# Patient Record
Sex: FEMALE | Race: BLACK OR AFRICAN AMERICAN | NOT HISPANIC OR LATINO | Employment: UNEMPLOYED | RURAL
[De-identification: names, ages, dates, MRNs, and addresses within clinical notes are randomized per-mention and may not be internally consistent; named-entity substitution may affect disease eponyms.]

---

## 2016-07-07 LAB — CRC RECOMMENDATION EXT: NORMAL

## 2017-12-05 ENCOUNTER — HISTORICAL (OUTPATIENT)
Dept: ADMINISTRATIVE | Facility: HOSPITAL | Age: 52
End: 2017-12-05

## 2017-12-11 LAB
LAB AP CLINICAL INFORMATION: NORMAL
LAB AP COMMENTS: NORMAL
LAB AP GENERAL CAT - HISTORICAL: NORMAL
LAB AP INTERPRETATION/RESULT - HISTORICAL: NEGATIVE
LAB AP SPECIMEN ADEQUACY - HISTORICAL: NORMAL
LAB AP SPECIMEN SUBMITTED - HISTORICAL: NORMAL

## 2018-03-02 ENCOUNTER — HISTORICAL (OUTPATIENT)
Dept: ADMINISTRATIVE | Facility: HOSPITAL | Age: 53
End: 2018-03-02

## 2018-03-05 LAB
LAB AP CLINICAL INFORMATION: NORMAL
LAB AP COMMENTS: NORMAL
LAB AP DIAGNOSIS - HISTORICAL: NORMAL
LAB AP GROSS PATHOLOGY - HISTORICAL: NORMAL
LAB AP SPECIMEN SUBMITTED - HISTORICAL: NORMAL

## 2020-11-09 ENCOUNTER — HISTORICAL (OUTPATIENT)
Dept: ADMINISTRATIVE | Facility: HOSPITAL | Age: 55
End: 2020-11-09

## 2021-01-12 ENCOUNTER — HISTORICAL (OUTPATIENT)
Dept: ADMINISTRATIVE | Facility: HOSPITAL | Age: 56
End: 2021-01-12

## 2021-01-27 ENCOUNTER — HISTORICAL (OUTPATIENT)
Dept: ADMINISTRATIVE | Facility: HOSPITAL | Age: 56
End: 2021-01-27

## 2021-02-02 ENCOUNTER — HISTORICAL (OUTPATIENT)
Dept: ADMINISTRATIVE | Facility: HOSPITAL | Age: 56
End: 2021-02-02

## 2021-03-19 DIAGNOSIS — Z91.09 ENVIRONMENTAL ALLERGIES: Primary | ICD-10-CM

## 2021-03-19 DIAGNOSIS — M25.50 ARTHRALGIA, UNSPECIFIED JOINT: ICD-10-CM

## 2021-03-19 RX ORDER — IBUPROFEN 800 MG/1
800 TABLET ORAL 3 TIMES DAILY
COMMUNITY
End: 2021-03-19 | Stop reason: SDUPTHER

## 2021-03-19 RX ORDER — CETIRIZINE HYDROCHLORIDE 10 MG/1
10 TABLET ORAL DAILY
Qty: 90 TABLET | Refills: 0 | Status: SHIPPED | OUTPATIENT
Start: 2021-03-19 | End: 2021-08-18 | Stop reason: SDUPTHER

## 2021-03-19 RX ORDER — CETIRIZINE HYDROCHLORIDE 10 MG/1
10 TABLET ORAL DAILY
COMMUNITY
Start: 2021-01-16 | End: 2021-03-19 | Stop reason: SDUPTHER

## 2021-03-19 RX ORDER — IBUPROFEN 800 MG/1
800 TABLET ORAL EVERY 6 HOURS PRN
Qty: 90 TABLET | Refills: 0 | Status: SHIPPED | OUTPATIENT
Start: 2021-03-19 | End: 2021-04-18

## 2021-03-23 DIAGNOSIS — R06.02 SHORTNESS OF BREATH: Primary | ICD-10-CM

## 2021-04-16 DIAGNOSIS — R13.10 DYSPHAGIA: Primary | ICD-10-CM

## 2021-04-17 VITALS — HEIGHT: 67 IN | WEIGHT: 183 LBS | BODY MASS INDEX: 28.72 KG/M2

## 2021-04-17 RX ORDER — ASPIRIN 81 MG/1
81 TABLET ORAL DAILY
COMMUNITY

## 2021-04-17 RX ORDER — MIRTAZAPINE 15 MG/1
TABLET, FILM COATED ORAL
Status: ON HOLD | COMMUNITY
Start: 2021-04-07 | End: 2021-11-30 | Stop reason: HOSPADM

## 2021-04-17 RX ORDER — OMEPRAZOLE 40 MG/1
1 CAPSULE, DELAYED RELEASE ORAL DAILY
COMMUNITY
Start: 2021-01-16 | End: 2021-08-18 | Stop reason: SDUPTHER

## 2021-04-17 RX ORDER — PHENAZOPYRIDINE HYDROCHLORIDE 200 MG/1
200 TABLET, FILM COATED ORAL 3 TIMES DAILY PRN
COMMUNITY
End: 2022-05-18

## 2021-04-17 RX ORDER — LOTEPREDNOL ETABONATE 5 MG/ML
1 SUSPENSION/ DROPS OPHTHALMIC
COMMUNITY

## 2021-04-17 RX ORDER — ESCITALOPRAM OXALATE 10 MG/1
1 TABLET ORAL DAILY
Status: ON HOLD | COMMUNITY
Start: 2021-04-05 | End: 2021-11-30 | Stop reason: HOSPADM

## 2021-04-17 RX ORDER — MIRABEGRON 50 MG/1
1 TABLET, FILM COATED, EXTENDED RELEASE ORAL DAILY
Status: ON HOLD | COMMUNITY
End: 2021-11-30 | Stop reason: HOSPADM

## 2021-04-17 RX ORDER — HYDROCODONE BITARTRATE AND ACETAMINOPHEN 10; 325 MG/1; MG/1
1 TABLET ORAL EVERY 8 HOURS PRN
COMMUNITY
Start: 2021-02-28

## 2021-04-17 RX ORDER — HYDROCHLOROTHIAZIDE 12.5 MG/1
12.5 TABLET ORAL DAILY
COMMUNITY
End: 2022-05-18 | Stop reason: SDUPTHER

## 2021-04-17 RX ORDER — HYDRALAZINE HYDROCHLORIDE 10 MG/1
10 TABLET, FILM COATED ORAL 2 TIMES DAILY
COMMUNITY
End: 2021-08-18 | Stop reason: SDUPTHER

## 2021-04-17 RX ORDER — BUSPIRONE HYDROCHLORIDE 15 MG/1
1 TABLET ORAL 3 TIMES DAILY
COMMUNITY
Start: 2021-03-02 | End: 2022-04-05

## 2021-04-17 RX ORDER — AMITRIPTYLINE HYDROCHLORIDE 25 MG/1
1 TABLET, FILM COATED ORAL DAILY
Status: ON HOLD | COMMUNITY
Start: 2021-04-06 | End: 2021-11-30 | Stop reason: HOSPADM

## 2021-04-17 RX ORDER — BUDESONIDE AND FORMOTEROL FUMARATE DIHYDRATE 160; 4.5 UG/1; UG/1
2 AEROSOL RESPIRATORY (INHALATION) 2 TIMES DAILY
COMMUNITY
Start: 2021-04-02 | End: 2022-05-18 | Stop reason: SDUPTHER

## 2021-04-19 ENCOUNTER — OFFICE VISIT (OUTPATIENT)
Dept: PULMONOLOGY | Facility: CLINIC | Age: 56
End: 2021-04-19
Payer: COMMERCIAL

## 2021-04-19 ENCOUNTER — CLINICAL SUPPORT (OUTPATIENT)
Dept: PULMONOLOGY | Facility: HOSPITAL | Age: 56
End: 2021-04-19
Payer: COMMERCIAL

## 2021-04-19 VITALS
SYSTOLIC BLOOD PRESSURE: 120 MMHG | DIASTOLIC BLOOD PRESSURE: 90 MMHG | HEART RATE: 72 BPM | RESPIRATION RATE: 16 BRPM | BODY MASS INDEX: 28.72 KG/M2 | OXYGEN SATURATION: 91 % | WEIGHT: 183 LBS | HEIGHT: 67 IN

## 2021-04-19 VITALS — OXYGEN SATURATION: 95 %

## 2021-04-19 DIAGNOSIS — K21.9 GERD WITHOUT ESOPHAGITIS: ICD-10-CM

## 2021-04-19 DIAGNOSIS — J45.40 MODERATE PERSISTENT ASTHMA WITHOUT COMPLICATION: ICD-10-CM

## 2021-04-19 DIAGNOSIS — R06.02 SHORTNESS OF BREATH: ICD-10-CM

## 2021-04-19 PROCEDURE — 94060 EVALUATION OF WHEEZING: CPT | Mod: 26,,, | Performed by: INTERNAL MEDICINE

## 2021-04-19 PROCEDURE — 94729 PR C02/MEMBANE DIFFUSE CAPACITY: ICD-10-PCS | Mod: 26,,, | Performed by: INTERNAL MEDICINE

## 2021-04-19 PROCEDURE — 94060 PR EVAL OF BRONCHOSPASM: ICD-10-PCS | Mod: 26,,, | Performed by: INTERNAL MEDICINE

## 2021-04-19 PROCEDURE — 99999 PR PBB SHADOW E&M-EST. PATIENT-LVL V: CPT | Mod: PBBFAC,,, | Performed by: INTERNAL MEDICINE

## 2021-04-19 PROCEDURE — 94729 DIFFUSING CAPACITY: CPT

## 2021-04-19 PROCEDURE — 94727 GAS DIL/WSHOT DETER LNG VOL: CPT | Performed by: INTERNAL MEDICINE

## 2021-04-19 PROCEDURE — 94729 DIFFUSING CAPACITY: CPT | Performed by: INTERNAL MEDICINE

## 2021-04-19 PROCEDURE — 94729 DIFFUSING CAPACITY: CPT | Mod: 26,,, | Performed by: INTERNAL MEDICINE

## 2021-04-19 PROCEDURE — 99999 PR PBB SHADOW E&M-EST. PATIENT-LVL V: ICD-10-PCS | Mod: PBBFAC,,, | Performed by: INTERNAL MEDICINE

## 2021-04-19 PROCEDURE — 99214 PR OFFICE/OUTPT VISIT, EST, LEVL IV, 30-39 MIN: ICD-10-PCS | Mod: S$PBB,25,, | Performed by: INTERNAL MEDICINE

## 2021-04-19 PROCEDURE — 94727 PR PULM FUNCTION TEST BY GAS: ICD-10-PCS | Mod: 26,,, | Performed by: INTERNAL MEDICINE

## 2021-04-19 PROCEDURE — 94726 PLETHYSMOGRAPHY LUNG VOLUMES: CPT

## 2021-04-19 PROCEDURE — 94010 BREATHING CAPACITY TEST: CPT | Mod: PBBFAC | Performed by: INTERNAL MEDICINE

## 2021-04-19 PROCEDURE — 94726 PLETHYSMOGRAPHY LUNG VOLUMES: CPT | Mod: PBBFAC | Performed by: INTERNAL MEDICINE

## 2021-04-19 PROCEDURE — 94727 GAS DIL/WSHOT DETER LNG VOL: CPT | Mod: 26,,, | Performed by: INTERNAL MEDICINE

## 2021-04-19 PROCEDURE — 99214 OFFICE O/P EST MOD 30 MIN: CPT | Mod: S$PBB,25,, | Performed by: INTERNAL MEDICINE

## 2021-04-19 PROCEDURE — 94729 DIFFUSING CAPACITY: CPT | Mod: PBBFAC | Performed by: INTERNAL MEDICINE

## 2021-04-19 PROCEDURE — 99215 OFFICE O/P EST HI 40 MIN: CPT | Mod: PBBFAC,25 | Performed by: INTERNAL MEDICINE

## 2021-04-19 PROCEDURE — 94060 EVALUATION OF WHEEZING: CPT | Performed by: INTERNAL MEDICINE

## 2021-04-19 PROCEDURE — 94060 EVALUATION OF WHEEZING: CPT

## 2021-04-28 DIAGNOSIS — Z11.52 ENCOUNTER FOR SCREENING FOR COVID-19: Primary | ICD-10-CM

## 2021-04-28 LAB
CTP QC/QA: YES
SARS-COV-2 RDRP RESP QL NAA+PROBE: NEGATIVE

## 2021-04-28 PROCEDURE — U0002: ICD-10-PCS | Mod: QW,,, | Performed by: NURSE PRACTITIONER

## 2021-04-28 PROCEDURE — U0002 COVID-19 LAB TEST NON-CDC: HCPCS | Mod: QW,,, | Performed by: NURSE PRACTITIONER

## 2021-04-29 ENCOUNTER — ANESTHESIA EVENT (OUTPATIENT)
Dept: GASTROENTEROLOGY | Facility: HOSPITAL | Age: 56
End: 2021-04-29
Payer: COMMERCIAL

## 2021-04-29 ENCOUNTER — ANESTHESIA (OUTPATIENT)
Dept: GASTROENTEROLOGY | Facility: HOSPITAL | Age: 56
End: 2021-04-29
Payer: COMMERCIAL

## 2021-04-29 ENCOUNTER — HOSPITAL ENCOUNTER (OUTPATIENT)
Dept: GASTROENTEROLOGY | Facility: HOSPITAL | Age: 56
Discharge: HOME | End: 2021-04-29
Attending: INTERNAL MEDICINE
Payer: COMMERCIAL

## 2021-04-29 VITALS
SYSTOLIC BLOOD PRESSURE: 138 MMHG | HEART RATE: 75 BPM | OXYGEN SATURATION: 97 % | TEMPERATURE: 98 F | BODY MASS INDEX: 28.66 KG/M2 | DIASTOLIC BLOOD PRESSURE: 81 MMHG | RESPIRATION RATE: 17 BRPM | WEIGHT: 183 LBS

## 2021-04-29 DIAGNOSIS — R13.10 DYSPHAGIA: ICD-10-CM

## 2021-04-29 DIAGNOSIS — R13.19 ESOPHAGEAL DYSPHAGIA: ICD-10-CM

## 2021-04-29 PROCEDURE — 63600175 PHARM REV CODE 636 W HCPCS: Performed by: NURSE ANESTHETIST, CERTIFIED REGISTERED

## 2021-04-29 PROCEDURE — 25000003 PHARM REV CODE 250: Performed by: NURSE ANESTHETIST, CERTIFIED REGISTERED

## 2021-04-29 PROCEDURE — D9220A PRA ANESTHESIA: ICD-10-PCS | Mod: ,,, | Performed by: NURSE ANESTHETIST, CERTIFIED REGISTERED

## 2021-04-29 PROCEDURE — 43450 DILATE ESOPHAGUS 1/MULT PASS: CPT

## 2021-04-29 PROCEDURE — 88342 SURGICAL PATHOLOGY: ICD-10-PCS | Mod: 26,,, | Performed by: PATHOLOGY

## 2021-04-29 PROCEDURE — 37000008 HC ANESTHESIA 1ST 15 MINUTES

## 2021-04-29 PROCEDURE — 43239 EGD BIOPSY SINGLE/MULTIPLE: CPT

## 2021-04-29 PROCEDURE — 88305 TISSUE EXAM BY PATHOLOGIST: CPT | Mod: SUR | Performed by: INTERNAL MEDICINE

## 2021-04-29 PROCEDURE — D9220A PRA ANESTHESIA: Mod: ,,, | Performed by: NURSE ANESTHETIST, CERTIFIED REGISTERED

## 2021-04-29 PROCEDURE — 88305 SURGICAL PATHOLOGY: ICD-10-PCS | Mod: 26,,, | Performed by: PATHOLOGY

## 2021-04-29 PROCEDURE — 88342 IMHCHEM/IMCYTCHM 1ST ANTB: CPT | Mod: 26,,, | Performed by: PATHOLOGY

## 2021-04-29 PROCEDURE — 27201423 OPTIME MED/SURG SUP & DEVICES STERILE SUPPLY

## 2021-04-29 PROCEDURE — 27000284 HC CANNULA NASAL: Performed by: NURSE ANESTHETIST, CERTIFIED REGISTERED

## 2021-04-29 PROCEDURE — C1889 IMPLANT/INSERT DEVICE, NOC: HCPCS

## 2021-04-29 PROCEDURE — 88305 TISSUE EXAM BY PATHOLOGIST: CPT | Mod: 26,,, | Performed by: PATHOLOGY

## 2021-04-29 RX ORDER — SODIUM CHLORIDE 0.9 % (FLUSH) 0.9 %
10 SYRINGE (ML) INJECTION
Status: DISCONTINUED | OUTPATIENT
Start: 2021-04-29 | End: 2021-04-30 | Stop reason: HOSPADM

## 2021-04-29 RX ORDER — LIDOCAINE HYDROCHLORIDE 20 MG/ML
INJECTION INTRAVENOUS
Status: DISCONTINUED | OUTPATIENT
Start: 2021-04-29 | End: 2021-04-29

## 2021-04-29 RX ORDER — PROPOFOL 10 MG/ML
VIAL (ML) INTRAVENOUS
Status: DISCONTINUED | OUTPATIENT
Start: 2021-04-29 | End: 2021-04-29

## 2021-04-29 RX ADMIN — PROPOFOL 50 MG: 10 INJECTION, EMULSION INTRAVENOUS at 08:04

## 2021-04-29 RX ADMIN — LIDOCAINE HYDROCHLORIDE 50 MG: 20 INJECTION, SOLUTION INTRAVENOUS at 08:04

## 2021-04-29 RX ADMIN — SODIUM CHLORIDE: 9 INJECTION, SOLUTION INTRAVENOUS at 08:04

## 2021-04-30 LAB
ESTROGEN SERPL-MCNC: NORMAL PG/ML
LAB AP GROSS DESCRIPTION: NORMAL
LAB AP LABORATORY NOTES: NORMAL
T3RU NFR SERPL: NORMAL %

## 2021-05-03 ENCOUNTER — TELEPHONE (OUTPATIENT)
Dept: GASTROENTEROLOGY | Facility: CLINIC | Age: 56
End: 2021-05-03

## 2021-06-16 PROBLEM — Z98.890 S/P RIGHT ROTATOR CUFF REPAIR: Status: ACTIVE | Noted: 2021-06-16

## 2021-08-18 ENCOUNTER — OFFICE VISIT (OUTPATIENT)
Dept: FAMILY MEDICINE | Facility: CLINIC | Age: 56
End: 2021-08-18
Payer: COMMERCIAL

## 2021-08-18 VITALS
HEART RATE: 78 BPM | DIASTOLIC BLOOD PRESSURE: 90 MMHG | SYSTOLIC BLOOD PRESSURE: 139 MMHG | RESPIRATION RATE: 20 BRPM | OXYGEN SATURATION: 99 % | BODY MASS INDEX: 30.71 KG/M2 | WEIGHT: 195.69 LBS | TEMPERATURE: 97 F | HEIGHT: 67 IN

## 2021-08-18 DIAGNOSIS — R35.0 URINARY FREQUENCY: Primary | ICD-10-CM

## 2021-08-18 DIAGNOSIS — I10 HYPERTENSION, UNSPECIFIED TYPE: ICD-10-CM

## 2021-08-18 DIAGNOSIS — N89.8 VAGINAL ITCHING: ICD-10-CM

## 2021-08-18 DIAGNOSIS — Z91.09 ENVIRONMENTAL ALLERGIES: ICD-10-CM

## 2021-08-18 LAB
BASOPHILS # BLD AUTO: 0.03 K/UL (ref 0–0.2)
BASOPHILS NFR BLD AUTO: 0.7 % (ref 0–1)
BILIRUB UR QL STRIP: NEGATIVE
CANDIDA SPECIES: NEGATIVE
CHOLEST SERPL-MCNC: 167 MG/DL (ref 0–200)
CHOLEST/HDLC SERPL: 2.9 {RATIO}
CLARITY UR: CLEAR
COLOR UR: YELLOW
DIFFERENTIAL METHOD BLD: ABNORMAL
EOSINOPHIL # BLD AUTO: 0.05 K/UL (ref 0–0.5)
EOSINOPHIL NFR BLD AUTO: 1.2 % (ref 1–4)
ERYTHROCYTE [DISTWIDTH] IN BLOOD BY AUTOMATED COUNT: 12.5 % (ref 11.5–14.5)
GARDNERELLA: POSITIVE
GLUCOSE UR STRIP-MCNC: NEGATIVE MG/DL
HCT VFR BLD AUTO: 42.4 % (ref 38–47)
HDLC SERPL-MCNC: 58 MG/DL (ref 40–60)
HGB BLD-MCNC: 14 G/DL (ref 12–16)
IMM GRANULOCYTES # BLD AUTO: 0.01 K/UL (ref 0–0.04)
IMM GRANULOCYTES NFR BLD: 0.2 % (ref 0–0.4)
KETONES UR STRIP-SCNC: NEGATIVE MG/DL
LDLC SERPL CALC-MCNC: 88 MG/DL
LDLC/HDLC SERPL: 1.5 {RATIO}
LEUKOCYTE ESTERASE UR QL STRIP: NEGATIVE
LYMPHOCYTES # BLD AUTO: 2.25 K/UL (ref 1–4.8)
LYMPHOCYTES NFR BLD AUTO: 52.3 % (ref 27–41)
MCH RBC QN AUTO: 29.7 PG (ref 27–31)
MCHC RBC AUTO-ENTMCNC: 33 G/DL (ref 32–36)
MCV RBC AUTO: 89.8 FL (ref 80–96)
MONOCYTES # BLD AUTO: 0.38 K/UL (ref 0–0.8)
MONOCYTES NFR BLD AUTO: 8.8 % (ref 2–6)
MPC BLD CALC-MCNC: 11 FL (ref 9.4–12.4)
NEUTROPHILS # BLD AUTO: 1.58 K/UL (ref 1.8–7.7)
NEUTROPHILS NFR BLD AUTO: 36.8 % (ref 53–65)
NITRITE UR QL STRIP: NEGATIVE
NONHDLC SERPL-MCNC: 109 MG/DL
NRBC # BLD AUTO: 0 X10E3/UL
NRBC, AUTO (.00): 0 %
PH UR STRIP: 5.5 PH UNITS
PLATELET # BLD AUTO: 297 K/UL (ref 150–400)
PROT UR QL STRIP: NEGATIVE
RBC # BLD AUTO: 4.72 M/UL (ref 4.2–5.4)
RBC # UR STRIP: NEGATIVE /UL
SP GR UR STRIP: 1.02
TRICHOMONAS: NEGATIVE
TRIGL SERPL-MCNC: 104 MG/DL (ref 35–150)
UROBILINOGEN UR STRIP-ACNC: 1 MG/DL
VLDLC SERPL-MCNC: 21 MG/DL
WBC # BLD AUTO: 4.3 K/UL (ref 4.5–11)

## 2021-08-18 PROCEDURE — 87510 GARDNER VAG DNA DIR PROBE: CPT | Mod: ,,, | Performed by: CLINICAL MEDICAL LABORATORY

## 2021-08-18 PROCEDURE — 81003 URINALYSIS AUTO W/O SCOPE: CPT | Mod: QW,,, | Performed by: CLINICAL MEDICAL LABORATORY

## 2021-08-18 PROCEDURE — 87660 TRICHOMONAS VAGIN DIR PROBE: CPT | Mod: ,,, | Performed by: CLINICAL MEDICAL LABORATORY

## 2021-08-18 PROCEDURE — 87480 BACTERIAL VAGINOSIS: ICD-10-PCS | Mod: ,,, | Performed by: CLINICAL MEDICAL LABORATORY

## 2021-08-18 PROCEDURE — 99212 OFFICE O/P EST SF 10 MIN: CPT | Mod: ,,, | Performed by: NURSE PRACTITIONER

## 2021-08-18 PROCEDURE — 85025 CBC WITH DIFFERENTIAL: ICD-10-PCS | Mod: ,,, | Performed by: CLINICAL MEDICAL LABORATORY

## 2021-08-18 PROCEDURE — 87510 BACTERIAL VAGINOSIS: ICD-10-PCS | Mod: ,,, | Performed by: CLINICAL MEDICAL LABORATORY

## 2021-08-18 PROCEDURE — 99212 PR OFFICE/OUTPT VISIT, EST, LEVL II, 10-19 MIN: ICD-10-PCS | Mod: ,,, | Performed by: NURSE PRACTITIONER

## 2021-08-18 PROCEDURE — 80061 LIPID PANEL: CPT | Mod: ,,, | Performed by: CLINICAL MEDICAL LABORATORY

## 2021-08-18 PROCEDURE — 80061 LIPID PANEL: ICD-10-PCS | Mod: ,,, | Performed by: CLINICAL MEDICAL LABORATORY

## 2021-08-18 PROCEDURE — 81003 URINALYSIS, REFLEX TO URINE CULTURE: ICD-10-PCS | Mod: QW,,, | Performed by: CLINICAL MEDICAL LABORATORY

## 2021-08-18 PROCEDURE — 85025 COMPLETE CBC W/AUTO DIFF WBC: CPT | Mod: ,,, | Performed by: CLINICAL MEDICAL LABORATORY

## 2021-08-18 PROCEDURE — 87480 CANDIDA DNA DIR PROBE: CPT | Mod: ,,, | Performed by: CLINICAL MEDICAL LABORATORY

## 2021-08-18 PROCEDURE — 87660 BACTERIAL VAGINOSIS: ICD-10-PCS | Mod: ,,, | Performed by: CLINICAL MEDICAL LABORATORY

## 2021-08-18 RX ORDER — PAROXETINE HCL 10 MG
10 TABLET ORAL DAILY
Status: ON HOLD | COMMUNITY
Start: 2021-03-02 | End: 2021-11-30 | Stop reason: HOSPADM

## 2021-08-18 RX ORDER — HYDRALAZINE HYDROCHLORIDE 10 MG/1
10 TABLET, FILM COATED ORAL 2 TIMES DAILY
Qty: 180 TABLET | Refills: 1 | Status: SHIPPED | OUTPATIENT
Start: 2021-08-18 | End: 2022-02-21

## 2021-08-18 RX ORDER — CYCLOBENZAPRINE HCL 10 MG
10 TABLET ORAL 3 TIMES DAILY
COMMUNITY
Start: 2021-08-04

## 2021-08-18 RX ORDER — CETIRIZINE HYDROCHLORIDE 10 MG/1
10 TABLET ORAL DAILY
Qty: 90 TABLET | Refills: 0 | Status: SHIPPED | OUTPATIENT
Start: 2021-08-18 | End: 2022-01-31

## 2021-08-18 RX ORDER — IBUPROFEN 800 MG/1
800 TABLET ORAL 2 TIMES DAILY
COMMUNITY
Start: 2021-08-04 | End: 2022-05-18 | Stop reason: SDUPTHER

## 2021-08-18 RX ORDER — OMEPRAZOLE 40 MG/1
40 CAPSULE, DELAYED RELEASE ORAL DAILY
Qty: 90 CAPSULE | Refills: 1 | Status: SHIPPED | OUTPATIENT
Start: 2021-08-18 | End: 2021-11-01 | Stop reason: ALTCHOICE

## 2021-08-23 ENCOUNTER — HOSPITAL ENCOUNTER (OUTPATIENT)
Dept: RADIOLOGY | Facility: HOSPITAL | Age: 56
Discharge: HOME | End: 2021-08-23
Attending: NURSE PRACTITIONER
Payer: COMMERCIAL

## 2021-08-23 DIAGNOSIS — M54.2 CERVICALGIA: ICD-10-CM

## 2021-08-23 PROCEDURE — 72050 XR CERVICAL SPINE COMPLETE 5 VIEW: ICD-10-PCS | Mod: 26,,, | Performed by: RADIOLOGY

## 2021-08-23 PROCEDURE — 72050 X-RAY EXAM NECK SPINE 4/5VWS: CPT | Mod: TC

## 2021-08-23 PROCEDURE — 72050 X-RAY EXAM NECK SPINE 4/5VWS: CPT | Mod: 26,,, | Performed by: RADIOLOGY

## 2021-08-25 RX ORDER — METRONIDAZOLE 500 MG/1
500 TABLET ORAL EVERY 12 HOURS
Qty: 14 TABLET | Refills: 0 | Status: SHIPPED | OUTPATIENT
Start: 2021-08-25 | End: 2021-09-01

## 2021-09-01 ENCOUNTER — HOSPITAL ENCOUNTER (OUTPATIENT)
Dept: RADIOLOGY | Facility: HOSPITAL | Age: 56
Discharge: HOME | End: 2021-09-01
Attending: NURSE PRACTITIONER
Payer: COMMERCIAL

## 2021-09-01 DIAGNOSIS — M25.552 LEFT HIP PAIN: ICD-10-CM

## 2021-09-01 PROCEDURE — 73502 XR HIP WITH PELVIS WHEN PERFORMED, 2 OR 3 VIEWS LEFT: ICD-10-PCS | Mod: 26,LT,, | Performed by: RADIOLOGY

## 2021-09-01 PROCEDURE — 73502 X-RAY EXAM HIP UNI 2-3 VIEWS: CPT | Mod: 26,LT,, | Performed by: RADIOLOGY

## 2021-09-01 PROCEDURE — 73502 X-RAY EXAM HIP UNI 2-3 VIEWS: CPT | Mod: TC,LT

## 2021-11-01 ENCOUNTER — OFFICE VISIT (OUTPATIENT)
Dept: GASTROENTEROLOGY | Facility: CLINIC | Age: 56
End: 2021-11-01
Payer: MEDICARE

## 2021-11-01 VITALS
WEIGHT: 204 LBS | OXYGEN SATURATION: 97 % | HEIGHT: 67 IN | BODY MASS INDEX: 32.02 KG/M2 | DIASTOLIC BLOOD PRESSURE: 72 MMHG | HEART RATE: 74 BPM | SYSTOLIC BLOOD PRESSURE: 130 MMHG

## 2021-11-01 DIAGNOSIS — R10.9 ABDOMINAL PAIN, UNSPECIFIED ABDOMINAL LOCATION: Primary | ICD-10-CM

## 2021-11-01 DIAGNOSIS — K59.00 CONSTIPATION, UNSPECIFIED CONSTIPATION TYPE: ICD-10-CM

## 2021-11-01 DIAGNOSIS — K21.9 GERD WITHOUT ESOPHAGITIS: ICD-10-CM

## 2021-11-01 PROCEDURE — 99214 OFFICE O/P EST MOD 30 MIN: CPT | Mod: ,,, | Performed by: NURSE PRACTITIONER

## 2021-11-01 PROCEDURE — 99214 PR OFFICE/OUTPT VISIT, EST, LEVL IV, 30-39 MIN: ICD-10-PCS | Mod: ,,, | Performed by: NURSE PRACTITIONER

## 2021-11-01 RX ORDER — PANTOPRAZOLE SODIUM 40 MG/1
40 TABLET, DELAYED RELEASE ORAL DAILY
Qty: 30 TABLET | Refills: 2 | Status: SHIPPED | OUTPATIENT
Start: 2021-11-01 | End: 2022-03-08

## 2021-11-03 ENCOUNTER — TELEPHONE (OUTPATIENT)
Dept: GASTROENTEROLOGY | Facility: CLINIC | Age: 56
End: 2021-11-03
Payer: MEDICARE

## 2021-11-09 ENCOUNTER — HOSPITAL ENCOUNTER (OUTPATIENT)
Dept: RADIOLOGY | Facility: HOSPITAL | Age: 56
Discharge: HOME | End: 2021-11-09
Attending: NURSE PRACTITIONER
Payer: COMMERCIAL

## 2021-11-09 DIAGNOSIS — I99.8 PORTOSYSTEMIC SHUNT, SPONTANEOUS: Primary | ICD-10-CM

## 2021-11-09 DIAGNOSIS — R10.9 ABDOMINAL PAIN, UNSPECIFIED ABDOMINAL LOCATION: ICD-10-CM

## 2021-11-09 PROCEDURE — 74177 CT ABDOMEN PELVIS WITH CONTRAST: ICD-10-PCS | Mod: 26,,, | Performed by: RADIOLOGY

## 2021-11-09 PROCEDURE — 74177 CT ABD & PELVIS W/CONTRAST: CPT | Mod: 26,,, | Performed by: RADIOLOGY

## 2021-11-09 PROCEDURE — 25500020 PHARM REV CODE 255: Performed by: NURSE PRACTITIONER

## 2021-11-09 PROCEDURE — 74177 CT ABD & PELVIS W/CONTRAST: CPT | Mod: TC

## 2021-11-09 RX ADMIN — IOPAMIDOL 100 ML: 755 INJECTION, SOLUTION INTRAVENOUS at 10:11

## 2021-11-10 ENCOUNTER — CLINICAL SUPPORT (OUTPATIENT)
Dept: CARDIOLOGY | Facility: CLINIC | Age: 56
End: 2021-11-10
Payer: MEDICARE

## 2021-11-10 DIAGNOSIS — Z01.810 PRE-OPERATIVE CARDIOVASCULAR EXAMINATION: ICD-10-CM

## 2021-11-10 PROBLEM — M75.121 NONTRAUMATIC COMPLETE TEAR OF RIGHT ROTATOR CUFF: Status: ACTIVE | Noted: 2021-11-10

## 2021-11-10 PROCEDURE — 93005 ELECTROCARDIOGRAM TRACING: CPT | Mod: PBBFAC | Performed by: STUDENT IN AN ORGANIZED HEALTH CARE EDUCATION/TRAINING PROGRAM

## 2021-11-10 PROCEDURE — 93010 ELECTROCARDIOGRAM REPORT: CPT | Mod: S$PBB,,, | Performed by: STUDENT IN AN ORGANIZED HEALTH CARE EDUCATION/TRAINING PROGRAM

## 2021-11-10 PROCEDURE — 99212 OFFICE O/P EST SF 10 MIN: CPT | Mod: PBBFAC

## 2021-11-10 PROCEDURE — 93010 EKG 12-LEAD: ICD-10-PCS | Mod: S$PBB,,, | Performed by: STUDENT IN AN ORGANIZED HEALTH CARE EDUCATION/TRAINING PROGRAM

## 2021-11-22 ENCOUNTER — HOSPITAL ENCOUNTER (OUTPATIENT)
Dept: RADIOLOGY | Facility: HOSPITAL | Age: 56
Discharge: HOME | End: 2021-11-22
Attending: NURSE PRACTITIONER
Payer: COMMERCIAL

## 2021-11-22 DIAGNOSIS — I99.8 PORTOSYSTEMIC SHUNT, SPONTANEOUS: ICD-10-CM

## 2021-11-22 PROCEDURE — 91200 LIVER ELASTOGRAPHY: CPT | Mod: TC

## 2021-11-22 PROCEDURE — 91200 US ELASTOGRAPHY LIVER: ICD-10-PCS | Mod: 26,,, | Performed by: RADIOLOGY

## 2021-11-22 PROCEDURE — 91200 LIVER ELASTOGRAPHY: CPT | Mod: 26,,, | Performed by: RADIOLOGY

## 2021-11-29 RX ORDER — TRAZODONE HYDROCHLORIDE 50 MG/1
TABLET ORAL
COMMUNITY
Start: 2021-10-30 | End: 2022-05-18

## 2021-11-29 RX ORDER — HYDROXYZINE HYDROCHLORIDE 10 MG/1
TABLET, FILM COATED ORAL
COMMUNITY
Start: 2021-10-12 | End: 2022-07-29 | Stop reason: SDUPTHER

## 2021-11-29 RX ORDER — LABETALOL 100 MG/1
TABLET, FILM COATED ORAL
COMMUNITY
Start: 2021-10-30 | End: 2022-04-08

## 2021-11-29 RX ORDER — DIAZEPAM 5 MG/1
TABLET ORAL
COMMUNITY
Start: 2021-09-03 | End: 2022-04-05

## 2021-11-29 RX ORDER — DICYCLOMINE HYDROCHLORIDE 20 MG/1
TABLET ORAL
COMMUNITY
Start: 2021-09-16 | End: 2023-01-05

## 2021-11-29 RX ORDER — FLUCONAZOLE 150 MG/1
TABLET ORAL
COMMUNITY
Start: 2021-10-29 | End: 2022-04-05

## 2021-11-29 RX ORDER — ESTRADIOL 0.1 MG/G
CREAM VAGINAL
COMMUNITY
Start: 2021-10-29 | End: 2022-07-29

## 2021-11-29 RX ORDER — ESCITALOPRAM OXALATE 20 MG/1
TABLET ORAL
COMMUNITY
Start: 2021-11-02 | End: 2022-05-18 | Stop reason: SDUPTHER

## 2021-11-30 ENCOUNTER — ANESTHESIA (OUTPATIENT)
Dept: SURGERY | Facility: HOSPITAL | Age: 56
End: 2021-11-30
Payer: MEDICARE

## 2021-11-30 ENCOUNTER — HOSPITAL ENCOUNTER (OUTPATIENT)
Facility: HOSPITAL | Age: 56
Discharge: HOME | End: 2021-11-30
Attending: ORTHOPAEDIC SURGERY | Admitting: ORTHOPAEDIC SURGERY
Payer: MEDICARE

## 2021-11-30 ENCOUNTER — ANESTHESIA EVENT (OUTPATIENT)
Dept: SURGERY | Facility: HOSPITAL | Age: 56
End: 2021-11-30
Payer: COMMERCIAL

## 2021-11-30 VITALS
TEMPERATURE: 98 F | WEIGHT: 199 LBS | HEART RATE: 96 BPM | OXYGEN SATURATION: 95 % | BODY MASS INDEX: 31.23 KG/M2 | DIASTOLIC BLOOD PRESSURE: 80 MMHG | RESPIRATION RATE: 18 BRPM | HEIGHT: 67 IN | SYSTOLIC BLOOD PRESSURE: 141 MMHG

## 2021-11-30 DIAGNOSIS — M75.121 NONTRAUMATIC COMPLETE TEAR OF RIGHT ROTATOR CUFF: ICD-10-CM

## 2021-11-30 PROCEDURE — 63600175 PHARM REV CODE 636 W HCPCS: Performed by: ORTHOPAEDIC SURGERY

## 2021-11-30 PROCEDURE — 37000009 HC ANESTHESIA EA ADD 15 MINS: Performed by: ORTHOPAEDIC SURGERY

## 2021-11-30 PROCEDURE — C1713 ANCHOR/SCREW BN/BN,TIS/BN: HCPCS | Performed by: ORTHOPAEDIC SURGERY

## 2021-11-30 PROCEDURE — 36000711: Performed by: ORTHOPAEDIC SURGERY

## 2021-11-30 PROCEDURE — 71000033 HC RECOVERY, INTIAL HOUR: Performed by: ORTHOPAEDIC SURGERY

## 2021-11-30 PROCEDURE — 63600175 PHARM REV CODE 636 W HCPCS: Performed by: NURSE ANESTHETIST, CERTIFIED REGISTERED

## 2021-11-30 PROCEDURE — 71000015 HC POSTOP RECOV 1ST HR: Performed by: ORTHOPAEDIC SURGERY

## 2021-11-30 PROCEDURE — 97161 PT EVAL LOW COMPLEX 20 MIN: CPT

## 2021-11-30 PROCEDURE — 27000655: Performed by: ANESTHESIOLOGY

## 2021-11-30 PROCEDURE — 36000710: Performed by: ORTHOPAEDIC SURGERY

## 2021-11-30 PROCEDURE — D9220A PRA ANESTHESIA: ICD-10-PCS | Mod: CRNA,,, | Performed by: NURSE ANESTHETIST, CERTIFIED REGISTERED

## 2021-11-30 PROCEDURE — 25000003 PHARM REV CODE 250: Performed by: ORTHOPAEDIC SURGERY

## 2021-11-30 PROCEDURE — 27000716 HC OXISENSOR PROBE, ANY SIZE: Performed by: ANESTHESIOLOGY

## 2021-11-30 PROCEDURE — D9220A PRA ANESTHESIA: ICD-10-PCS | Mod: ANES,,, | Performed by: ANESTHESIOLOGY

## 2021-11-30 PROCEDURE — 27000165 HC TUBE, ETT CUFFED: Performed by: ANESTHESIOLOGY

## 2021-11-30 PROCEDURE — 25000003 PHARM REV CODE 250: Performed by: NURSE ANESTHETIST, CERTIFIED REGISTERED

## 2021-11-30 PROCEDURE — 64415 PERIPHERAL BLOCK: ICD-10-PCS | Mod: XU,RT,, | Performed by: ANESTHESIOLOGY

## 2021-11-30 PROCEDURE — 37000008 HC ANESTHESIA 1ST 15 MINUTES: Performed by: ORTHOPAEDIC SURGERY

## 2021-11-30 PROCEDURE — D9220A PRA ANESTHESIA: Mod: ANES,,, | Performed by: ANESTHESIOLOGY

## 2021-11-30 PROCEDURE — 27000509 HC TUBE SALEM SUMP ANY SIZE: Performed by: ANESTHESIOLOGY

## 2021-11-30 PROCEDURE — 63600175 PHARM REV CODE 636 W HCPCS: Performed by: ANESTHESIOLOGY

## 2021-11-30 PROCEDURE — 71000016 HC POSTOP RECOV ADDL HR: Performed by: ORTHOPAEDIC SURGERY

## 2021-11-30 PROCEDURE — 64415 NJX AA&/STRD BRCH PLXS IMG: CPT | Mod: XU,RT,, | Performed by: ANESTHESIOLOGY

## 2021-11-30 PROCEDURE — 27200750 HC INSULATED NEEDLE/ STIMUPLEX: Performed by: ANESTHESIOLOGY

## 2021-11-30 PROCEDURE — 27000510 HC BLANKET BAIR HUGGER ANY SIZE: Performed by: ANESTHESIOLOGY

## 2021-11-30 PROCEDURE — 27000260 *HC AIRWAY ORAL: Performed by: ANESTHESIOLOGY

## 2021-11-30 PROCEDURE — 27000689 HC BLADE LARYNGOSCOPE ANY SIZE: Performed by: ANESTHESIOLOGY

## 2021-11-30 PROCEDURE — D9220A PRA ANESTHESIA: Mod: CRNA,,, | Performed by: NURSE ANESTHETIST, CERTIFIED REGISTERED

## 2021-11-30 PROCEDURE — 27201423 OPTIME MED/SURG SUP & DEVICES STERILE SUPPLY: Performed by: ORTHOPAEDIC SURGERY

## 2021-11-30 PROCEDURE — 27100168 OPTIME MED/SURG SUP & DEVICES NON-STERILE SUPPLY: Performed by: ORTHOPAEDIC SURGERY

## 2021-11-30 DEVICE — IMPLANTABLE DEVICE: Type: IMPLANTABLE DEVICE | Site: SHOULDER | Status: FUNCTIONAL

## 2021-11-30 DEVICE — IMP SUTURE ANCHOR SWVLKC CLD 4.75X19.1MM: Type: IMPLANTABLE DEVICE | Site: SHOULDER | Status: FUNCTIONAL

## 2021-11-30 RX ORDER — EPHEDRINE SULFATE 50 MG/ML
INJECTION, SOLUTION INTRAVENOUS
Status: DISCONTINUED | OUTPATIENT
Start: 2021-11-30 | End: 2021-11-30

## 2021-11-30 RX ORDER — HYDROMORPHONE HYDROCHLORIDE 2 MG/ML
0.5 INJECTION, SOLUTION INTRAMUSCULAR; INTRAVENOUS; SUBCUTANEOUS EVERY 5 MIN PRN
Status: DISCONTINUED | OUTPATIENT
Start: 2021-11-30 | End: 2021-11-30 | Stop reason: HOSPADM

## 2021-11-30 RX ORDER — BUPIVACAINE HYDROCHLORIDE 2.5 MG/ML
INJECTION, SOLUTION EPIDURAL; INFILTRATION; INTRACAUDAL
Status: DISCONTINUED | OUTPATIENT
Start: 2021-11-30 | End: 2021-11-30 | Stop reason: HOSPADM

## 2021-11-30 RX ORDER — HYDROCODONE BITARTRATE AND ACETAMINOPHEN 5; 325 MG/1; MG/1
1 TABLET ORAL EVERY 4 HOURS PRN
Status: CANCELLED | OUTPATIENT
Start: 2021-11-30

## 2021-11-30 RX ORDER — DIPHENHYDRAMINE HYDROCHLORIDE 50 MG/ML
25 INJECTION INTRAMUSCULAR; INTRAVENOUS EVERY 6 HOURS PRN
Status: DISCONTINUED | OUTPATIENT
Start: 2021-11-30 | End: 2021-11-30 | Stop reason: HOSPADM

## 2021-11-30 RX ORDER — LIDOCAINE HYDROCHLORIDE 20 MG/ML
INJECTION, SOLUTION EPIDURAL; INFILTRATION; INTRACAUDAL; PERINEURAL
Status: DISCONTINUED | OUTPATIENT
Start: 2021-11-30 | End: 2021-11-30

## 2021-11-30 RX ORDER — MEPERIDINE HYDROCHLORIDE 25 MG/ML
25 INJECTION INTRAMUSCULAR; INTRAVENOUS; SUBCUTANEOUS EVERY 10 MIN PRN
Status: DISCONTINUED | OUTPATIENT
Start: 2021-11-30 | End: 2021-11-30 | Stop reason: HOSPADM

## 2021-11-30 RX ORDER — FENTANYL CITRATE 50 UG/ML
INJECTION, SOLUTION INTRAMUSCULAR; INTRAVENOUS
Status: DISCONTINUED | OUTPATIENT
Start: 2021-11-30 | End: 2021-11-30

## 2021-11-30 RX ORDER — SODIUM CHLORIDE 9 MG/ML
INJECTION, SOLUTION INTRAVENOUS CONTINUOUS
Status: DISPENSED | OUTPATIENT
Start: 2021-11-30

## 2021-11-30 RX ORDER — ROCURONIUM BROMIDE 10 MG/ML
INJECTION, SOLUTION INTRAVENOUS
Status: DISCONTINUED | OUTPATIENT
Start: 2021-11-30 | End: 2021-11-30

## 2021-11-30 RX ORDER — MORPHINE SULFATE 10 MG/ML
4 INJECTION INTRAMUSCULAR; INTRAVENOUS; SUBCUTANEOUS EVERY 5 MIN PRN
Status: DISCONTINUED | OUTPATIENT
Start: 2021-11-30 | End: 2021-11-30 | Stop reason: HOSPADM

## 2021-11-30 RX ORDER — EPINEPHRINE 1 MG/ML
INJECTION, SOLUTION INTRACARDIAC; INTRAMUSCULAR; INTRAVENOUS; SUBCUTANEOUS
Status: DISCONTINUED | OUTPATIENT
Start: 2021-11-30 | End: 2021-11-30 | Stop reason: HOSPADM

## 2021-11-30 RX ORDER — PROMETHAZINE HYDROCHLORIDE 25 MG/1
25 TABLET ORAL EVERY 6 HOURS PRN
Status: DISCONTINUED | OUTPATIENT
Start: 2021-11-30 | End: 2021-11-30 | Stop reason: HOSPADM

## 2021-11-30 RX ORDER — KETOROLAC TROMETHAMINE 30 MG/ML
INJECTION, SOLUTION INTRAMUSCULAR; INTRAVENOUS
Status: DISCONTINUED | OUTPATIENT
Start: 2021-11-30 | End: 2021-11-30

## 2021-11-30 RX ORDER — ACETAMINOPHEN 500 MG
1000 TABLET ORAL EVERY 6 HOURS PRN
Status: DISCONTINUED | OUTPATIENT
Start: 2021-11-30 | End: 2021-11-30 | Stop reason: HOSPADM

## 2021-11-30 RX ORDER — ROPIVACAINE HYDROCHLORIDE 7.5 MG/ML
INJECTION, SOLUTION EPIDURAL; PERINEURAL
Status: COMPLETED | OUTPATIENT
Start: 2021-11-30 | End: 2021-11-30

## 2021-11-30 RX ORDER — SODIUM CHLORIDE, SODIUM LACTATE, POTASSIUM CHLORIDE, CALCIUM CHLORIDE 600; 310; 30; 20 MG/100ML; MG/100ML; MG/100ML; MG/100ML
INJECTION, SOLUTION INTRAVENOUS CONTINUOUS
Status: DISCONTINUED | OUTPATIENT
Start: 2021-11-30 | End: 2021-11-30 | Stop reason: HOSPADM

## 2021-11-30 RX ORDER — MIDAZOLAM HYDROCHLORIDE 1 MG/ML
INJECTION INTRAMUSCULAR; INTRAVENOUS
Status: DISCONTINUED | OUTPATIENT
Start: 2021-11-30 | End: 2021-11-30

## 2021-11-30 RX ORDER — ONDANSETRON 2 MG/ML
4 INJECTION INTRAMUSCULAR; INTRAVENOUS DAILY PRN
Status: DISCONTINUED | OUTPATIENT
Start: 2021-11-30 | End: 2021-11-30 | Stop reason: HOSPADM

## 2021-11-30 RX ORDER — LIDOCAINE HYDROCHLORIDE 10 MG/ML
1 INJECTION, SOLUTION EPIDURAL; INFILTRATION; INTRACAUDAL; PERINEURAL ONCE
Status: DISCONTINUED | OUTPATIENT
Start: 2021-11-30 | End: 2021-11-30 | Stop reason: HOSPADM

## 2021-11-30 RX ORDER — CEFAZOLIN SODIUM 2 G/50ML
2 SOLUTION INTRAVENOUS
Status: COMPLETED | OUTPATIENT
Start: 2021-11-30 | End: 2021-11-30

## 2021-11-30 RX ORDER — OXYCODONE HYDROCHLORIDE 5 MG/1
10 TABLET ORAL EVERY 4 HOURS PRN
Status: CANCELLED | OUTPATIENT
Start: 2021-11-30

## 2021-11-30 RX ORDER — ONDANSETRON 2 MG/ML
INJECTION INTRAMUSCULAR; INTRAVENOUS
Status: DISCONTINUED | OUTPATIENT
Start: 2021-11-30 | End: 2021-11-30

## 2021-11-30 RX ORDER — PROPOFOL 10 MG/ML
VIAL (ML) INTRAVENOUS
Status: DISCONTINUED | OUTPATIENT
Start: 2021-11-30 | End: 2021-11-30

## 2021-11-30 RX ORDER — DEXAMETHASONE SODIUM PHOSPHATE 4 MG/ML
INJECTION, SOLUTION INTRA-ARTICULAR; INTRALESIONAL; INTRAMUSCULAR; INTRAVENOUS; SOFT TISSUE
Status: DISCONTINUED | OUTPATIENT
Start: 2021-11-30 | End: 2021-11-30

## 2021-11-30 RX ORDER — OXYCODONE AND ACETAMINOPHEN 10; 325 MG/1; MG/1
1 TABLET ORAL EVERY 6 HOURS PRN
Qty: 28 TABLET | Refills: 0 | Status: SHIPPED | OUTPATIENT
Start: 2021-11-30 | End: 2022-04-05

## 2021-11-30 RX ORDER — SODIUM CHLORIDE, SODIUM LACTATE, POTASSIUM CHLORIDE, CALCIUM CHLORIDE 600; 310; 30; 20 MG/100ML; MG/100ML; MG/100ML; MG/100ML
125 INJECTION, SOLUTION INTRAVENOUS CONTINUOUS
Status: DISCONTINUED | OUTPATIENT
Start: 2021-11-30 | End: 2021-11-30 | Stop reason: HOSPADM

## 2021-11-30 RX ORDER — ONDANSETRON 4 MG/1
8 TABLET, ORALLY DISINTEGRATING ORAL EVERY 8 HOURS PRN
Status: DISCONTINUED | OUTPATIENT
Start: 2021-11-30 | End: 2021-11-30 | Stop reason: HOSPADM

## 2021-11-30 RX ADMIN — PROPOFOL 150 MG: 10 INJECTION, EMULSION INTRAVENOUS at 12:11

## 2021-11-30 RX ADMIN — ROPIVACAINE HYDROCHLORIDE 30 ML: 7.5 INJECTION, SOLUTION EPIDURAL; PERINEURAL at 12:11

## 2021-11-30 RX ADMIN — DEXAMETHASONE SODIUM PHOSPHATE 8 MG: 4 INJECTION, SOLUTION INTRA-ARTICULAR; INTRALESIONAL; INTRAMUSCULAR; INTRAVENOUS; SOFT TISSUE at 12:11

## 2021-11-30 RX ADMIN — EPHEDRINE SULFATE 10 MG: 50 INJECTION INTRAVENOUS at 12:11

## 2021-11-30 RX ADMIN — FENTANYL CITRATE 100 MCG: 50 INJECTION INTRAMUSCULAR; INTRAVENOUS at 12:11

## 2021-11-30 RX ADMIN — EPHEDRINE SULFATE 20 MG: 50 INJECTION INTRAVENOUS at 12:11

## 2021-11-30 RX ADMIN — CEFAZOLIN SODIUM 2 G: 1 INJECTION, POWDER, FOR SOLUTION INTRAMUSCULAR; INTRAVENOUS at 12:11

## 2021-11-30 RX ADMIN — ROCURONIUM BROMIDE 50 MG: 10 INJECTION, SOLUTION INTRAVENOUS at 12:11

## 2021-11-30 RX ADMIN — MIDAZOLAM 2 MG: 1 INJECTION INTRAMUSCULAR; INTRAVENOUS at 11:11

## 2021-11-30 RX ADMIN — ONDANSETRON 8 MG: 2 INJECTION INTRAMUSCULAR; INTRAVENOUS at 12:11

## 2021-11-30 RX ADMIN — LIDOCAINE HYDROCHLORIDE 100 MG: 20 INJECTION, SOLUTION INTRAVENOUS at 12:11

## 2021-11-30 RX ADMIN — SODIUM CHLORIDE: 9 INJECTION, SOLUTION INTRAVENOUS at 11:11

## 2021-11-30 RX ADMIN — KETOROLAC TROMETHAMINE 60 MG: 30 INJECTION, SOLUTION INTRAMUSCULAR at 12:11

## 2021-12-21 ENCOUNTER — OFFICE VISIT (OUTPATIENT)
Dept: GASTROENTEROLOGY | Facility: CLINIC | Age: 56
End: 2021-12-21
Payer: MEDICARE

## 2021-12-21 VITALS
OXYGEN SATURATION: 97 % | HEIGHT: 67 IN | DIASTOLIC BLOOD PRESSURE: 76 MMHG | HEART RATE: 88 BPM | BODY MASS INDEX: 32.96 KG/M2 | WEIGHT: 210 LBS | SYSTOLIC BLOOD PRESSURE: 153 MMHG

## 2021-12-21 DIAGNOSIS — K74.00 HEPATIC FIBROSIS: Primary | ICD-10-CM

## 2021-12-21 DIAGNOSIS — K59.00 CONSTIPATION, UNSPECIFIED CONSTIPATION TYPE: ICD-10-CM

## 2021-12-21 DIAGNOSIS — K74.00 LIVER FIBROSIS: Primary | ICD-10-CM

## 2021-12-21 DIAGNOSIS — K21.9 GERD WITHOUT ESOPHAGITIS: ICD-10-CM

## 2021-12-21 PROCEDURE — 99214 OFFICE O/P EST MOD 30 MIN: CPT | Mod: ,,, | Performed by: NURSE PRACTITIONER

## 2021-12-21 PROCEDURE — 85610 PROTHROMBIN TIME: CPT | Performed by: NURSE PRACTITIONER

## 2021-12-21 PROCEDURE — 99214 PR OFFICE/OUTPT VISIT, EST, LEVL IV, 30-39 MIN: ICD-10-PCS | Mod: ,,, | Performed by: NURSE PRACTITIONER

## 2021-12-22 ENCOUNTER — CLINICAL SUPPORT (OUTPATIENT)
Dept: REHABILITATION | Facility: HOSPITAL | Age: 56
End: 2021-12-22
Attending: ORTHOPAEDIC SURGERY
Payer: MEDICARE

## 2021-12-22 DIAGNOSIS — R53.1 DECREASED STRENGTH: ICD-10-CM

## 2021-12-22 DIAGNOSIS — R52 PAIN: ICD-10-CM

## 2021-12-22 DIAGNOSIS — M25.60 DECREASED RANGE OF MOTION: ICD-10-CM

## 2021-12-22 DIAGNOSIS — Z98.890 S/P RIGHT ROTATOR CUFF REPAIR: Primary | ICD-10-CM

## 2021-12-22 PROCEDURE — 97161 PT EVAL LOW COMPLEX 20 MIN: CPT

## 2022-01-04 ENCOUNTER — CLINICAL SUPPORT (OUTPATIENT)
Dept: REHABILITATION | Facility: HOSPITAL | Age: 57
End: 2022-01-04
Attending: ORTHOPAEDIC SURGERY
Payer: MEDICARE

## 2022-01-04 ENCOUNTER — TELEPHONE (OUTPATIENT)
Dept: GASTROENTEROLOGY | Facility: CLINIC | Age: 57
End: 2022-01-04
Payer: MEDICARE

## 2022-01-04 DIAGNOSIS — R52 PAIN: ICD-10-CM

## 2022-01-04 DIAGNOSIS — M25.60 DECREASED RANGE OF MOTION: Primary | ICD-10-CM

## 2022-01-04 DIAGNOSIS — R53.1 DECREASED STRENGTH: ICD-10-CM

## 2022-01-04 PROCEDURE — 97110 THERAPEUTIC EXERCISES: CPT | Mod: CQ

## 2022-01-04 PROCEDURE — 97140 MANUAL THERAPY 1/> REGIONS: CPT | Mod: CQ

## 2022-01-04 NOTE — PROGRESS NOTES
[facility]   Physical Therapy Treatment Note     Name: Sheri Ho  Clinic Number: [id number]    Therapy Diagnosis:   Encounter Diagnoses   Name Primary?    Decreased strength     Decreased range of motion Yes    Pain      Physician: Cain Treviño MD    Visit Date: 1/4/2022  Physician Orders: PT Eval and Treat   Medical Diagnosis from Referral: S/P right rotator cuff repair  Evaluation Date: 12/22/2021  Authorization Period Expiration: 12/15/22  Plan of Care Expiration: 1/24/22  Visit # / Visits authorized: 2/      PTA Visit #: 1/5     Time In: 1450  Time Out: 1517  Total Billable Time: 27 minutes    SUBJECTIVE     Pt reports: can't sleep, pain is bad.  She was compliant with home exercise program.  Response to previous treatment: no problems  Functional change: can't use RUE    Pain: 8/10  Location: right shoulder      OBJECTIVE     Objective Measures updated at progress report unless specified.     Treatment     Sheri received the treatments listed below:      therapeutic exercises to develop ROM and posture for 5 minutes including:  Pendulum review, scap retracts x 20 with 3 sec hold    manual therapy techniques: Joint mobilizations, Myofacial release and Soft tissue Mobilization were applied to the: right shoulder for 22 minutes, including:  PROM and capsule stretch and ocillations for healing and pain relief    neuromuscular re-education activities to improve: Posture for 1 minutes. The following activities were included:  Scapular position    therapeutic activities to improve functional performance for 0  minutes, including:  -    gait training to improve functional mobility and safety for 0  minutes, including:  -    direct contact modalities after being cleared for contraindications:     supervised modalities after being cleared for contradictions:     hot pack for 0 minutes to -.    cold pack for 0 minutes to -.        Patient Education and Home Exercises     Home  Exercises Provided and Patient Education Provided     Education provided:   - HEP review    Written Home Exercises Provided: Patient instructed to cont prior HEP. Exercises were reviewed and Sheri was able to demonstrate them prior to the end of the session.  Sheri demonstrated good  understanding of the education provided. See EMR under Patient Instructions for exercises provided during therapy sessions    ASSESSMENT     Case conference with Bere Lucas PT for initial PTA visit. Pt uses arm for repositioning on table and for donning and doffing jacket and brace without any noted discomfort or gaurding. Allowed ROM well and made corrections to pendulum performance.    Sheri Is progressing well towards her goals.   Pt prognosis is Good.     Pt will continue to benefit from skilled outpatient physical therapy to address the deficits listed in the problem list box on initial evaluation, provide pt/family education and to maximize pt's level of independence in the home and community environment.     Pt's spiritual, cultural and educational needs considered and pt agreeable to plan of care and goals.     Anticipated barriers to physical therapy: previous repair    Goals: 1. Patient will be able to have shoulder flexion to 90, external rotation to 20 and internal rotation to right hip  2. Patient will be able to sleep through the night without waking from pain in her own bed  3. Patient will be able to dress/bathe self independently     Long Term Goals: 8 weeks   1. Patient will have right shoulder flexion to 120, external rotation to 50 and internal rotation to L3  2. Patient will complete activities of daily living independently with 2/10 pain  3. Patient will have normal scapular stability of the right with no compensation/deviations during range of motion  4. Patient will have 4/5 right manual muscle test    PLAN     Proceed with scapular strengthening    Maegan Gillette, PTA

## 2022-01-04 NOTE — TELEPHONE ENCOUNTER
Called lab results and recommendations. Patient verbalized understanding.      ----- Message from DELFINA Toussaint sent at 12/27/2021  7:10 AM CST -----  So far liver labs are okay. She is immune to Hepatitis A and B, no vaccination needed.

## 2022-01-10 ENCOUNTER — CLINICAL SUPPORT (OUTPATIENT)
Dept: REHABILITATION | Facility: HOSPITAL | Age: 57
End: 2022-01-10
Payer: MEDICARE

## 2022-01-10 DIAGNOSIS — M25.60 DECREASED RANGE OF MOTION: ICD-10-CM

## 2022-01-10 DIAGNOSIS — R52 PAIN: ICD-10-CM

## 2022-01-10 DIAGNOSIS — R53.1 DECREASED STRENGTH: Primary | ICD-10-CM

## 2022-01-10 PROCEDURE — 97110 THERAPEUTIC EXERCISES: CPT

## 2022-01-10 PROCEDURE — 97140 MANUAL THERAPY 1/> REGIONS: CPT

## 2022-01-10 NOTE — PROGRESS NOTES
[facility]   Physical Therapy Treatment Note     Name: Sheri Ho  Clinic Number: [id number]    Therapy Diagnosis:   Encounter Diagnoses   Name Primary?    Decreased strength Yes    Decreased range of motion     Pain      Physician: Cain Treviño MD    Visit Date: 1/10/2022  Physician Orders: PT Eval and Treat   Medical Diagnosis from Referral: S/P right rotator cuff repair  Evaluation Date: 12/22/2021  Authorization Period Expiration: 12/15/22  Plan of Care Expiration: 1/24/22  Visit # / Visits authorized: 3 (unlimited)    Time In: 310pm  Time Out: 350pm  Total Billable Time:  40    minutes    SUBJECTIVE     Pt reports: can't sleep, pain is bad.  She was compliant with home exercise program.  Response to previous treatment: no problems  Functional change: can't use RUE    Pain: 8/10  Location: right shoulder      OBJECTIVE     Objective Measures updated at progress report unless specified.     Treatment     Sheri received the treatments listed below:    UBE x 5 min  PROM x 12 min  Supine cane flexion x 10  SL external rotation x 20  Scapular proprioceptive neuromuscular facilitation x 30  Bilateral shoulder extension blue x 20  Bilateral row blue x 20    Shoulder flexion 85 active      (not today)  therapeutic exercises to develop ROM and posture for 5 minutes including:  Pendulum review, scap retracts x 20 with 3 sec hold    manual therapy techniques: Joint mobilizations, Myofacial release and Soft tissue Mobilization were applied to the: right shoulder for 22 minutes, including:  PROM and capsule stretch and ocillations for healing and pain relief    neuromuscular re-education activities to improve: Posture for 1 minutes. The following activities were included:  Scapular position      Patient Education and Home Exercises     Home Exercises Provided and Patient Education Provided     Education provided:   - HEP review    Written Home Exercises Provided: Patient instructed to  cont prior HEP. Exercises were reviewed and Sheri was able to demonstrate them prior to the end of the session.  Sheri demonstrated good  understanding of the education provided. See EMR under Patient Instructions for exercises provided during therapy sessions    ASSESSMENT     Patient fatigued with exercises but did well with not compensating during active exercises. Gained range of motion since evaluation from 60 to 85 flexion.     Sheri Is progressing well towards her goals.   Pt prognosis is Good.     Pt will continue to benefit from skilled outpatient physical therapy to address the deficits listed in the problem list box on initial evaluation, provide pt/family education and to maximize pt's level of independence in the home and community environment.     Pt's spiritual, cultural and educational needs considered and pt agreeable to plan of care and goals.     Anticipated barriers to physical therapy: previous repair    Goals: 1. Patient will be able to have shoulder flexion to 90, external rotation to 20 and internal rotation to right hip  2. Patient will be able to sleep through the night without waking from pain in her own bed  3. Patient will be able to dress/bathe self independently     Long Term Goals: 8 weeks   1. Patient will have right shoulder flexion to 120, external rotation to 50 and internal rotation to L3  2. Patient will complete activities of daily living independently with 2/10 pain  3. Patient will have normal scapular stability of the right with no compensation/deviations during range of motion  4. Patient will have 4/5 right manual muscle test    PLAN     Proceed with scapular strengthening    JONATHAN BREWSTER, PT

## 2022-01-13 ENCOUNTER — CLINICAL SUPPORT (OUTPATIENT)
Dept: REHABILITATION | Facility: HOSPITAL | Age: 57
End: 2022-01-13
Attending: ORTHOPAEDIC SURGERY
Payer: MEDICARE

## 2022-01-13 DIAGNOSIS — R52 PAIN: ICD-10-CM

## 2022-01-13 DIAGNOSIS — M25.60 DECREASED RANGE OF MOTION: Primary | ICD-10-CM

## 2022-01-13 DIAGNOSIS — R53.1 DECREASED STRENGTH: ICD-10-CM

## 2022-01-13 PROCEDURE — 97140 MANUAL THERAPY 1/> REGIONS: CPT | Mod: CQ

## 2022-01-13 PROCEDURE — 97110 THERAPEUTIC EXERCISES: CPT | Mod: CQ

## 2022-01-13 NOTE — PROGRESS NOTES
[facility]   Physical Therapy Treatment Note     Name: Sheri Ho  Clinic Number: [id number]    Therapy Diagnosis:   Encounter Diagnoses   Name Primary?    Decreased strength     Decreased range of motion Yes    Pain      Physician: Cain Treviño MD    Visit Date: 1/13/2022  Physician Orders: PT Eval and Treat   Medical Diagnosis from Referral: S/P right rotator cuff repair  Evaluation Date: 12/22/2021  Authorization Period Expiration: 12/15/22  Plan of Care Expiration: 1/24/22  Visit # / Visits authorized: 4 (unlimited)    Time In: 1045  Time Out: 1130  Total Billable Time:  45    minutes    SUBJECTIVE     Pt reports: can't sleep, pain is bad.  She was compliant with home exercise program.  Response to previous treatment: no problems  Functional change: can't use RUE    Pain: 8/10  Location: right shoulder      OBJECTIVE     Objective Measures updated at progress report unless specified.     Treatment     Sheri received the treatments listed below:    UBE x 5 min  PROM x 12 min  Isometrics for Extension, ABDUCTION, EXTERNAL ROTATION, INTERNAL ROTATION x 20 with 3 sec hold each  Supine cane flexion x 10 NTv   SL external rotation x 20  Scapular proprioceptive neuromuscular facilitation x 30  Bilateral shoulder extension blue x 20 NOT THIS VISIT   Bilateral row blue x 20 NOT THIS VISIT     Shoulder flexion 85 active      (not today)  therapeutic exercises to develop ROM and posture for 5 minutes including:  Pendulum review, scap retracts x 20 with 3 sec hold    manual therapy techniques: Joint mobilizations, Myofacial release and Soft tissue Mobilization were applied to the: right shoulder for 10 minutes, including:  PROM and capsule stretch and ocillations for healing and pain relief    neuromuscular re-education activities to improve: Posture for 1 minutes. The following activities were included:  Scapular position      Patient Education and Home Exercises     Home Exercises  Provided and Patient Education Provided     Education provided:   - HEP review    Written Home Exercises Provided: Patient instructed to cont prior HEP. Exercises were reviewed and Sheri was able to demonstrate them prior to the end of the session.  Sheri demonstrated good  understanding of the education provided. See EMR under Patient Instructions for exercises provided during therapy sessions    ASSESSMENT     Patient without c/o pain during session. Some shaking in UE and B hands but pt reports is nerve related and not pain related. Pt is not wearing sling today but needs reminders to allow for elbow extension while standing and walking.    Sheri Is progressing well towards her goals.   Pt prognosis is Good.     Pt will continue to benefit from skilled outpatient physical therapy to address the deficits listed in the problem list box on initial evaluation, provide pt/family education and to maximize pt's level of independence in the home and community environment.     Pt's spiritual, cultural and educational needs considered and pt agreeable to plan of care and goals.     Anticipated barriers to physical therapy: previous repair    Goals: 1. Patient will be able to have shoulder flexion to 90, external rotation to 20 and internal rotation to right hip  2. Patient will be able to sleep through the night without waking from pain in her own bed  3. Patient will be able to dress/bathe self independently     Long Term Goals: 8 weeks   1. Patient will have right shoulder flexion to 120, external rotation to 50 and internal rotation to L3  2. Patient will complete activities of daily living independently with 2/10 pain  3. Patient will have normal scapular stability of the right with no compensation/deviations during range of motion  4. Patient will have 4/5 right manual muscle test    PLAN     Proceed with scapular strengthening    Maegan Gillette, PTA

## 2022-01-19 ENCOUNTER — CLINICAL SUPPORT (OUTPATIENT)
Dept: REHABILITATION | Facility: HOSPITAL | Age: 57
End: 2022-01-19
Attending: ORTHOPAEDIC SURGERY
Payer: MEDICARE

## 2022-01-19 DIAGNOSIS — R52 PAIN: ICD-10-CM

## 2022-01-19 DIAGNOSIS — M25.60 DECREASED RANGE OF MOTION: Primary | ICD-10-CM

## 2022-01-19 DIAGNOSIS — R53.1 DECREASED STRENGTH: ICD-10-CM

## 2022-01-19 PROCEDURE — 97110 THERAPEUTIC EXERCISES: CPT | Mod: CQ

## 2022-01-19 PROCEDURE — 97140 MANUAL THERAPY 1/> REGIONS: CPT | Mod: CQ

## 2022-01-19 PROCEDURE — 97112 NEUROMUSCULAR REEDUCATION: CPT | Mod: CQ

## 2022-01-19 NOTE — PROGRESS NOTES
[facility]   Physical Therapy Treatment Note     Name: Sheri Ho  Clinic Number: [id number]    Therapy Diagnosis:   Encounter Diagnoses   Name Primary?    Decreased strength     Decreased range of motion Yes    Pain      Physician: Cain Treviño MD    Visit Date: 1/19/2022  Physician Orders: PT Eval and Treat   Medical Diagnosis from Referral: S/P right rotator cuff repair  Evaluation Date: 12/22/2021  Authorization Period Expiration: 12/15/22  Plan of Care Expiration: 1/24/22  Visit # / Visits authorized: 4 (unlimited)    Time In: 1045  Time Out: 1130  Total Billable Time:  45    minutes    SUBJECTIVE     Pt reports: was sick with sinuses last visit and couldn't come  She was compliant with home exercise program.  Response to previous treatment: no problems  Functional change: can't use RUE    Pain: 7/10  Location: right shoulder      OBJECTIVE     Objective Measures updated at progress report unless specified.     Treatment     Sheri received the treatments listed below:    UBE x 5 min NOT THIS VISIT  Pulleys x 5 min  PROM x 12 min  Isometrics for Extension, ABDUCTION, EXTERNAL ROTATION, INTERNAL ROTATION x 20 with 3 sec hold each  Supine cane flexion x 10 NTv   SL external rotation x 20  Scapular proprioceptive neuromuscular facilitation x 30  Seated lat depression isometrics x 20 with 3 sec hold  Bilateral shoulder extension blue x 20 NOT THIS VISIT   Bilateral row blue x 20 NOT THIS VISIT     Shoulder flexion 85 active      (not today)  therapeutic exercises to develop ROM and posture for 5 minutes including:  Pendulum review, scap retracts x 20 with 3 sec hold    manual therapy techniques: Joint mobilizations, Myofacial release and Soft tissue Mobilization were applied to the: right shoulder for 10 minutes, including:  PROM and capsule stretch and ocillations for healing and pain relief    neuromuscular re-education activities to improve: Posture for 0 minutes. The  following activities were included:  Scapular position      Patient Education and Home Exercises     Home Exercises Provided and Patient Education Provided     Education provided:   - HEP review    Written Home Exercises Provided: Patient instructed to cont prior HEP. Exercises were reviewed and Sheri was able to demonstrate them prior to the end of the session.  Sheri demonstrated good  understanding of the education provided. See EMR under Patient Instructions for exercises provided during therapy sessions    ASSESSMENT     Patient without c/o pain during session. Better at allowing relaxed elbow position while standing and walking outside of brace.    Sheri Is progressing well towards her goals.   Pt prognosis is Good.     Pt will continue to benefit from skilled outpatient physical therapy to address the deficits listed in the problem list box on initial evaluation, provide pt/family education and to maximize pt's level of independence in the home and community environment.     Pt's spiritual, cultural and educational needs considered and pt agreeable to plan of care and goals.     Anticipated barriers to physical therapy: previous repair    Goals: 1. Patient will be able to have shoulder flexion to 90, external rotation to 20 and internal rotation to right hip  2. Patient will be able to sleep through the night without waking from pain in her own bed  3. Patient will be able to dress/bathe self independently     Long Term Goals: 8 weeks   1. Patient will have right shoulder flexion to 120, external rotation to 50 and internal rotation to L3  2. Patient will complete activities of daily living independently with 2/10 pain  3. Patient will have normal scapular stability of the right with no compensation/deviations during range of motion  4. Patient will have 4/5 right manual muscle test    PLAN     Proceed with scapular strengthening    Maegan Gillette, PTA

## 2022-01-24 ENCOUNTER — CLINICAL SUPPORT (OUTPATIENT)
Dept: REHABILITATION | Facility: HOSPITAL | Age: 57
End: 2022-01-24
Attending: ORTHOPAEDIC SURGERY
Payer: MEDICARE

## 2022-01-24 DIAGNOSIS — R53.1 DECREASED STRENGTH: ICD-10-CM

## 2022-01-24 DIAGNOSIS — M25.60 DECREASED RANGE OF MOTION: ICD-10-CM

## 2022-01-24 DIAGNOSIS — R52 PAIN: Primary | ICD-10-CM

## 2022-01-24 PROCEDURE — 97140 MANUAL THERAPY 1/> REGIONS: CPT | Mod: CQ

## 2022-01-24 PROCEDURE — 97110 THERAPEUTIC EXERCISES: CPT | Mod: CQ

## 2022-01-24 PROCEDURE — 97014 ELECTRIC STIMULATION THERAPY: CPT | Mod: CQ

## 2022-01-24 NOTE — PROGRESS NOTES
[facility]   Physical Therapy Treatment Note     Name: Sheri Ho  Clinic Number: [id number]    Therapy Diagnosis:   Encounter Diagnoses   Name Primary?    Decreased strength     Decreased range of motion     Pain Yes     Physician: Cain Treviño MD    Visit Date: 1/24/2022  Physician Orders: PT Eval and Treat   Medical Diagnosis from Referral: S/P right rotator cuff repair  Evaluation Date: 12/22/2021  Authorization Period Expiration: 12/15/22  Plan of Care Expiration: 1/24/22  Visit # / Visits authorized: 4 (unlimited)    Time In: 1100  Time Out: 1145  Total Billable Time:  45    minutes    SUBJECTIVE     Pt reports: was sick with sinuses last visit and couldn't come  She was compliant with home exercise program.  Response to previous treatment: no problems  Functional change: can't use RUE    Pain: 8/10  Location: right shoulder      OBJECTIVE     Objective Measures updated at progress report unless specified.     Treatment     Sheri received the treatments listed below:    UBE x 5 min   Pulleys x 5 min  PROM x 12 min  Isometrics for Extension, ABDUCTION, EXTERNAL ROTATION, INTERNAL ROTATION x 20 with 3 sec hold each  Supine cane flexion x 10 NTv   SL external rotation x 20  Scapular proprioceptive neuromuscular facilitation x 30  Seated lat depression isometrics x 20 with 3 sec hold NOT THIS VISIT   Bilateral shoulder extension blue x 20 NOT THIS VISIT   Bilateral row blue x 20 NOT THIS VISIT     Shoulder flexion 85 active      (not today)  therapeutic exercises to develop ROM and posture for 0 minutes including:  Pendulum review, scap retracts x 20 with 3 sec hold    manual therapy techniques: Joint mobilizations, Myofacial release and Soft tissue Mobilization were applied to the: right shoulder for 0 minutes, including:  PROM and capsule stretch and ocillations for healing and pain relief    neuromuscular re-education activities to improve: Posture for 0 minutes. The  following activities were included:  Scapular position    IFC to right shoulder area x 15 minutes    Patient Education and Home Exercises     Home Exercises Provided and Patient Education Provided     Education provided:   - HEP review    Written Home Exercises Provided: Patient instructed to cont prior HEP. Exercises were reviewed and Sheri was able to demonstrate them prior to the end of the session.  Sheri demonstrated good  understanding of the education provided. See EMR under Patient Instructions for exercises provided during therapy sessions    ASSESSMENT     Patient with decreased pain at end of session. Still has shaking at area of soft tissue tension with flexion. Min c/o discomfort with stretching but decreased overall pain.    Sheri Is progressing well towards her goals.   Pt prognosis is Good.     Pt will continue to benefit from skilled outpatient physical therapy to address the deficits listed in the problem list box on initial evaluation, provide pt/family education and to maximize pt's level of independence in the home and community environment.     Pt's spiritual, cultural and educational needs considered and pt agreeable to plan of care and goals.     Anticipated barriers to physical therapy: previous repair    Goals: 1. Patient will be able to have shoulder flexion to 90, external rotation to 20 and internal rotation to right hip  2. Patient will be able to sleep through the night without waking from pain in her own bed  3. Patient will be able to dress/bathe self independently     Long Term Goals: 8 weeks   1. Patient will have right shoulder flexion to 120, external rotation to 50 and internal rotation to L3  2. Patient will complete activities of daily living independently with 2/10 pain  3. Patient will have normal scapular stability of the right with no compensation/deviations during range of motion  4. Patient will have 4/5 right manual muscle test    PLAN     Proceed with scapular  strengthening    Maegan Gillette, PTA

## 2022-01-27 ENCOUNTER — CLINICAL SUPPORT (OUTPATIENT)
Dept: REHABILITATION | Facility: HOSPITAL | Age: 57
End: 2022-01-27
Attending: ORTHOPAEDIC SURGERY
Payer: MEDICARE

## 2022-01-27 DIAGNOSIS — M25.60 DECREASED RANGE OF MOTION: ICD-10-CM

## 2022-01-27 DIAGNOSIS — R53.1 DECREASED STRENGTH: Primary | ICD-10-CM

## 2022-01-27 DIAGNOSIS — R52 PAIN: ICD-10-CM

## 2022-01-27 PROCEDURE — 97110 THERAPEUTIC EXERCISES: CPT

## 2022-01-27 PROCEDURE — 97140 MANUAL THERAPY 1/> REGIONS: CPT

## 2022-01-27 NOTE — PROGRESS NOTES
[facility]   Physical Therapy Updated Plan of Care    Name: Sheri Ho  Clinic Number: [id number]    Therapy Diagnosis:   Encounter Diagnoses   Name Primary?    Decreased strength Yes    Decreased range of motion     Pain      Physician: Cain Treviño MD    Visit Date: 1/27/2022  Physician Orders: PT Eval and Treat   Medical Diagnosis from Referral: S/P right rotator cuff repair  Evaluation Date: 12/22/2021  Authorization Period Expiration: 12/15/22  Plan of Care Expiration: 2/29/22  Visit # / Visits authorized: 7 (unlimited)    Time In: 312pm  Time Out: 350pm  Total Billable Time:  38  minutes    SUBJECTIVE     Pt reports: This am the pain was so bad it was a 10/10. Had to take medication    She was compliant with home exercise program.  Response to previous treatment: no problems  Functional change: can't use RUE    Pain: 5/10 with medication  Location: right shoulder      OBJECTIVE     Objective Measures updated at progress report unless specified.     Treatment     Sheri received the treatments listed below:    UBE x 5 min   Pulleys x 5 min  PROM x 12 min  Supine cane flexion x 10   SL external rotation 1lb x 20  Scapular retractions   scaption stretch x 10  Bilateral shoulder extension blue x 20   Bilateral row blue x 20     Shoulder flexion 90 active with no compensation      Patient Education and Home Exercises     Home Exercises Provided and Patient Education Provided     Education provided:   - HEP review    Written Home Exercises Provided: Patient instructed to cont prior HEP. Exercises were reviewed and Sheri was able to demonstrate them prior to the end of the session.  Sheri demonstrated good  understanding of the education provided. See EMR under Patient Instructions for exercises provided during therapy sessions    ASSESSMENT     Patient tends to raise trapezius to assist with flexion. Verbal cues required to not compensate with shoulder active mobility. Patient is  apprehensive with therapy secondary to already having a surgery and not a good experience with therapy the first time. She is willing and pleasant and already making progress with range of motion. Patient will continue to benefit from skilled physical therapy at this time to address deficits.     Sheri Is progressing well towards her goals.   Pt prognosis is Good.     Pt will continue to benefit from skilled outpatient physical therapy to address the deficits listed in the problem list box on initial evaluation, provide pt/family education and to maximize pt's level of independence in the home and community environment.     Pt's spiritual, cultural and educational needs considered and pt agreeable to plan of care and goals.     Anticipated barriers to physical therapy: previous repair    Goals: 1. Patient will be able to have shoulder flexion to 90, external rotation to 20 and internal rotation to right hip  2. Patient will be able to sleep through the night without waking from pain in her own bed  3. Patient will be able to dress/bathe self independently     Long Term Goals: 8 weeks   1. Patient will have right shoulder flexion to 120, external rotation to 50 and internal rotation to L3  2. Patient will complete activities of daily living independently with 2/10 pain  3. Patient will have normal scapular stability of the right with no compensation/deviations during range of motion  4. Patient will have 4/5 right manual muscle test    Reasons for Recertification of Therapy: to continue to progress toward goals    Plan     Updated Certification Period: 1/28/2022 to 2/29/22  Recommended Treatment Plan: 2 times per week for 8 weeks: Electrical Stimulation IFC/Premod, Iontophoresis (with Dex), Manual Therapy, Moist Heat/ Ice, Neuromuscular Re-ed, Patient Education, Therapeutic Exercise and Ultrasound  Other Recommendations: none     JONATHAN BREWSTER, PT  1/28/2022      I CERTIFY THE NEED FOR THESE SERVICES FURNISHED  UNDER THIS PLAN OF TREATMENT AND WHILE UNDER MY CARE.    Physician's comments:      Physician's Signature: ___________________________________________________

## 2022-01-31 ENCOUNTER — CLINICAL SUPPORT (OUTPATIENT)
Dept: REHABILITATION | Facility: HOSPITAL | Age: 57
End: 2022-01-31
Attending: ORTHOPAEDIC SURGERY
Payer: MEDICARE

## 2022-01-31 DIAGNOSIS — M25.60 DECREASED RANGE OF MOTION: ICD-10-CM

## 2022-01-31 DIAGNOSIS — R53.1 DECREASED STRENGTH: Primary | ICD-10-CM

## 2022-01-31 DIAGNOSIS — R52 PAIN: ICD-10-CM

## 2022-01-31 PROCEDURE — 97110 THERAPEUTIC EXERCISES: CPT

## 2022-01-31 PROCEDURE — 97140 MANUAL THERAPY 1/> REGIONS: CPT

## 2022-01-31 NOTE — PROGRESS NOTES
[facility]   Physical Therapy Treatment Note    Name: Sheri Ho  Clinic Number: [id number]    Therapy Diagnosis:   Encounter Diagnoses   Name Primary?    Decreased strength Yes    Decreased range of motion     Pain      Physician: Cain Treviño MD    Visit Date: 1/31/2022  Physician Orders: PT Eval and Treat   Medical Diagnosis from Referral: S/P right rotator cuff repair  Evaluation Date: 12/22/2021  Authorization Period Expiration: 12/15/22  Plan of Care Expiration: 2/29/22  Visit # / Visits authorized: 8 (unlimited)    Time In: 835am  Time Out:  925am  Total Billable Time:  50  minutes    SUBJECTIVE     Pt reports: Took medication so pain went to a 5.     She was compliant with home exercise program.  Response to previous treatment: no problems  Functional change: can't use RUE    Pain: 5/10 with medication  Location: right shoulder      OBJECTIVE     Objective Measures updated at progress report unless specified.     Treatment     Sheri received the treatments listed below:    UBE x 5 min   Pulleys x 5 min  PROM x 12 min  Supine cane flexion 2lb x 10   SL external rotation 1lb x 30  Scapular retractions x 30  scaption stretch x 10  Cybex rows 3pl x 30  Cybex shoulder press 1pl x 20      Shoulder flexion 95 active with no compensation      Patient Education and Home Exercises     Home Exercises Provided and Patient Education Provided     Education provided:   - HEP review    Written Home Exercises Provided: Patient instructed to cont prior HEP. Exercises were reviewed and Sheri was able to demonstrate them prior to the end of the session.  Sheri demonstrated good  understanding of the education provided. See EMR under Patient Instructions for exercises provided during therapy sessions    ASSESSMENT     Patient has improved awareness of scapular retraction during exercises. Making minimal gains with shoulder range of motion. Will continue to progress as able.     Sheri Is  progressing well towards her goals.   Pt prognosis is Good.     Pt will continue to benefit from skilled outpatient physical therapy to address the deficits listed in the problem list box on initial evaluation, provide pt/family education and to maximize pt's level of independence in the home and community environment.     Pt's spiritual, cultural and educational needs considered and pt agreeable to plan of care and goals.     Anticipated barriers to physical therapy: previous repair    Goals: 1. Patient will be able to have shoulder flexion to 90, external rotation to 20 and internal rotation to right hip  2. Patient will be able to sleep through the night without waking from pain in her own bed  3. Patient will be able to dress/bathe self independently     Long Term Goals: 8 weeks   1. Patient will have right shoulder flexion to 120, external rotation to 50 and internal rotation to L3  2. Patient will complete activities of daily living independently with 2/10 pain  3. Patient will have normal scapular stability of the right with no compensation/deviations during range of motion  4. Patient will have 4/5 right manual muscle test        Plan     Progress stability, strengthening and range of motion in pain free range.     Updated Certification Period: 1/31/2022 to 2/29/22  Recommended Treatment Plan: 2 times per week for 8 weeks: Electrical Stimulation IFC/Premod, Iontophoresis (with Dex), Manual Therapy, Moist Heat/ Ice, Neuromuscular Re-ed, Patient Education, Therapeutic Exercise and Ultrasound  Other Recommendations: none     JONATHAN BREWSTER, PT  1/31/2022

## 2022-02-04 ENCOUNTER — CLINICAL SUPPORT (OUTPATIENT)
Dept: REHABILITATION | Facility: HOSPITAL | Age: 57
End: 2022-02-04
Attending: ORTHOPAEDIC SURGERY
Payer: MEDICARE

## 2022-02-04 DIAGNOSIS — R53.1 DECREASED STRENGTH: Primary | ICD-10-CM

## 2022-02-04 DIAGNOSIS — R52 PAIN: ICD-10-CM

## 2022-02-04 DIAGNOSIS — M25.60 DECREASED RANGE OF MOTION: ICD-10-CM

## 2022-02-04 PROCEDURE — 97112 NEUROMUSCULAR REEDUCATION: CPT | Mod: CQ

## 2022-02-04 PROCEDURE — 97140 MANUAL THERAPY 1/> REGIONS: CPT | Mod: CQ,59

## 2022-02-04 PROCEDURE — 97110 THERAPEUTIC EXERCISES: CPT | Mod: CQ

## 2022-02-04 NOTE — PROGRESS NOTES
[facility]   Physical Therapy Treatment Note    Name: Sheri Ho  Clinic Number: [id number]    Therapy Diagnosis:   Encounter Diagnoses   Name Primary?    Decreased strength     Decreased range of motion     Pain      Physician: Cain Treviño MD    Visit Date: 2/4/2022  Physician Orders: PT Eval and Treat   Medical Diagnosis from Referral: S/P right rotator cuff repair  Evaluation Date: 12/22/2021  Authorization Period Expiration: 12/15/22  Plan of Care Expiration: 2/29/22  Visit # / Visits authorized: 9 (unlimited)    Time In: 0902  Time Out:  0951  Total Billable Time:  49  minutes    SUBJECTIVE     Pt reports: Couldn't come Tuesday secondary too sore    She was compliant with home exercise program.  Response to previous treatment: very sore for several   Functional change: can't use RUE    Pain: 7/10 with no pain medication  Location: right shoulder      OBJECTIVE     Objective Measures updated at progress report unless specified.     Treatment     Sheri received the treatments listed below:    UBE x 5 min   Pulleys x 5 min extension isometrics at 30 and 90 degrees ABDUCTION x 20 with 3 sec hold each  INTERNAL ROTATION/ER isometrics at 90 x 20 with 3 sec hold  PROM x 12 min  Supine cane flexion 2lb x 10 NOT THIS VISIT   SL external rotation 1lb x 30  Scapular retractions x 20 SL  scaption stretch x 20  Cybex rows 3pl x 30  Cybex shoulder press 1pl x 20 NOT THIS VISIT  Lat pulls #1 x 10 with 5 sec hold      Shoulder flexion 95 active with no compensation      Patient Education and Home Exercises     Home Exercises Provided and Patient Education Provided     Education provided:   - HEP review    Written Home Exercises Provided: Patient instructed to cont prior HEP. Exercises were reviewed and Sheri was able to demonstrate them prior to the end of the session.  Sheri demonstrated good  understanding of the education provided. See EMR under Patient Instructions for exercises  provided during therapy sessions    ASSESSMENT     Patient expresses relief with activities today.     Sheri Is progressing well towards her goals.   Pt prognosis is Good.     Pt will continue to benefit from skilled outpatient physical therapy to address the deficits listed in the problem list box on initial evaluation, provide pt/family education and to maximize pt's level of independence in the home and community environment.     Pt's spiritual, cultural and educational needs considered and pt agreeable to plan of care and goals.     Anticipated barriers to physical therapy: previous repair    Goals: 1. Patient will be able to have shoulder flexion to 90, external rotation to 20 and internal rotation to right hip  2. Patient will be able to sleep through the night without waking from pain in her own bed  3. Patient will be able to dress/bathe self independently     Long Term Goals: 8 weeks   1. Patient will have right shoulder flexion to 120, external rotation to 50 and internal rotation to L3  2. Patient will complete activities of daily living independently with 2/10 pain  3. Patient will have normal scapular stability of the right with no compensation/deviations during range of motion  4. Patient will have 4/5 right manual muscle test        Plan     Progress stability, strengthening and range of motion in pain free range.     Updated Certification Period: 2/4/2022 to 2/29/22  Recommended Treatment Plan: 2 times per week for 8 weeks: Electrical Stimulation IFC/Premod, Iontophoresis (with Dex), Manual Therapy, Moist Heat/ Ice, Neuromuscular Re-ed, Patient Education, Therapeutic Exercise and Ultrasound  Other Recommendations: none     Maegan Gillette, PTA  2/4/2022

## 2022-02-07 ENCOUNTER — CLINICAL SUPPORT (OUTPATIENT)
Dept: REHABILITATION | Facility: HOSPITAL | Age: 57
End: 2022-02-07
Attending: ORTHOPAEDIC SURGERY
Payer: MEDICARE

## 2022-02-07 DIAGNOSIS — M25.60 DECREASED RANGE OF MOTION: ICD-10-CM

## 2022-02-07 DIAGNOSIS — R52 PAIN: ICD-10-CM

## 2022-02-07 DIAGNOSIS — R53.1 DECREASED STRENGTH: Primary | ICD-10-CM

## 2022-02-07 PROCEDURE — 97110 THERAPEUTIC EXERCISES: CPT

## 2022-02-07 PROCEDURE — 97140 MANUAL THERAPY 1/> REGIONS: CPT | Mod: 59

## 2022-02-07 PROCEDURE — 97035 APP MDLTY 1+ULTRASOUND EA 15: CPT

## 2022-02-07 NOTE — PROGRESS NOTES
[facility]   Physical Therapy Treatment Note    Name: Sheri Ho  Clinic Number: [id number]    Therapy Diagnosis:   Encounter Diagnoses   Name Primary?    Decreased strength Yes    Decreased range of motion     Pain      Physician: Cain Treviño MD    Visit Date: 2/7/2022  Physician Orders: PT Eval and Treat   Medical Diagnosis from Referral: S/P right rotator cuff repair  Evaluation Date: 12/22/2021  Authorization Period Expiration: 12/15/22  Plan of Care Expiration: 2/29/22  Visit # / Visits authorized: 10 (unlimited)    Time In: 835am  Time Out:  925am   Total Billable Time:   50 minutes    SUBJECTIVE     Pt reports: Worried about the shoulder. It is throbbing pain and would like an xray from MD today when I go to my visit. Would like to keep exercises low today.     She was compliant with home exercise program.  Response to previous treatment: very sore for several   Functional change: can't use RUE    Pain: 8/10 with no pain medication  Location: right shoulder      OBJECTIVE     Objective Measures updated at progress report unless specified.     Treatment     Sheri received the treatments listed below:    UBE x 5 min   Pulleys x 5 min extension isometrics at 30 and 90 degrees ABDUCTION x 20 with 3 sec hold each  Cybex rows 3pl x 30  Bilateral shoulder extension blue x 30  Standing external rotation blue x 30  PROM x 10 min    US x 8 min 1.0w/cm2 1mHz continuous with biofreeze added to right shoulder      INTERNAL ROTATION/ER isometrics at 90 x 20 with 3 sec hold  Supine cane flexion 2lb x 10 NOT THIS VISIT   SL external rotation 1lb x 30  Scapular retractions x 20 SL  scaption stretch x 20  Cybex shoulder press 1pl x 20 NOT THIS VISIT  Lat pulls #1 x 10 with 5 sec hold      Shoulder flexion 95 active with no compensation      Patient Education and Home Exercises     Home Exercises Provided and Patient Education Provided     Education provided:   - HEP review    Written Home  Exercises Provided: Patient instructed to cont prior HEP. Exercises were reviewed and Sheri was able to demonstrate them prior to the end of the session.  Sheri demonstrated good  understanding of the education provided. See EMR under Patient Instructions for exercises provided during therapy sessions    ASSESSMENT     Patient states she feels relief with therapy but the pain usually returns later in the day. Completed ultrasound to ease throbbing pain. Patient to see MD today and will re-assess pain level next session.     Sheri Is progressing well towards her goals.   Pt prognosis is Good.     Pt will continue to benefit from skilled outpatient physical therapy to address the deficits listed in the problem list box on initial evaluation, provide pt/family education and to maximize pt's level of independence in the home and community environment.     Pt's spiritual, cultural and educational needs considered and pt agreeable to plan of care and goals.     Anticipated barriers to physical therapy: previous repair    Goals: 1. Patient will be able to have shoulder flexion to 90, external rotation to 20 and internal rotation to right hip  2. Patient will be able to sleep through the night without waking from pain in her own bed  3. Patient will be able to dress/bathe self independently     Long Term Goals: 8 weeks   1. Patient will have right shoulder flexion to 120, external rotation to 50 and internal rotation to L3  2. Patient will complete activities of daily living independently with 2/10 pain  3. Patient will have normal scapular stability of the right with no compensation/deviations during range of motion  4. Patient will have 4/5 right manual muscle test        Plan     Progress stability, strengthening and range of motion in pain free range.     Updated Certification Period: 2/7/2022 to 2/29/22  Recommended Treatment Plan: 2 times per week for 8 weeks: Electrical Stimulation IFC/Premod, Iontophoresis (with Dex),  Manual Therapy, Moist Heat/ Ice, Neuromuscular Re-ed, Patient Education, Therapeutic Exercise and Ultrasound  Other Recommendations: none     JONATHAN BREWSTER, PT  2/7/2022

## 2022-02-09 ENCOUNTER — CLINICAL SUPPORT (OUTPATIENT)
Dept: REHABILITATION | Facility: HOSPITAL | Age: 57
End: 2022-02-09
Attending: ORTHOPAEDIC SURGERY
Payer: MEDICARE

## 2022-02-09 DIAGNOSIS — M25.60 DECREASED RANGE OF MOTION: ICD-10-CM

## 2022-02-09 DIAGNOSIS — R52 PAIN: ICD-10-CM

## 2022-02-09 DIAGNOSIS — R53.1 DECREASED STRENGTH: Primary | ICD-10-CM

## 2022-02-09 PROCEDURE — 97110 THERAPEUTIC EXERCISES: CPT | Mod: CQ

## 2022-02-09 PROCEDURE — 97112 NEUROMUSCULAR REEDUCATION: CPT | Mod: CQ

## 2022-02-09 PROCEDURE — 97140 MANUAL THERAPY 1/> REGIONS: CPT | Mod: CQ,59

## 2022-02-09 PROCEDURE — 97033 APP MDLTY 1+IONTPHRSIS EA 15: CPT | Mod: CQ

## 2022-02-09 NOTE — PROGRESS NOTES
[facility]   Physical Therapy Treatment Note    Name: Sheri Ho  Clinic Number: [id number]    Therapy Diagnosis:   Encounter Diagnoses   Name Primary?    Decreased strength Yes    Decreased range of motion     Pain      Physician: Cain Treviño MD    Visit Date: 2/9/2022  Physician Orders: PT Eval and Treat   Medical Diagnosis from Referral: S/P right rotator cuff repair  Evaluation Date: 12/22/2021  Authorization Period Expiration: 12/15/22  Plan of Care Expiration: 2/29/22  Visit # / Visits authorized: 11 (unlimited)    Time In: 0840  Time Out:  0920   Total Billable Time:   40 minutes    SUBJECTIVE     Pt reports: The medicine on my shoulder helped last time, still can't sleep in the bed    She was compliant with home exercise program.  Response to previous treatment: relief for several hours  Functional change: can't use RUE    Pain: 7/10 with no pain medication  Location: right shoulder      OBJECTIVE     Objective Measures updated at progress report unless specified.     Treatment     Sheri received the treatments listed below:    UBE x 5 min   Pulleys x 5 min extension isometrics at 30 and 90 degrees ABDUCTION x 20 with 3 sec hold each NOT THIS VISIT   SL EXTERNAL ROTATION x 30  SL scaption x 20  'D' on door X10  Proximal circles x 30 Cw, CCW  Cybex rows 3pl x 30  Lat pulls #1 x 15 with 5 sec hold  Bilateral shoulder extension blue x 30 NOT THIS VISIT   Standing external rotation blue x 30  PROM x 10 min    US x 0 min 1.0w/cm2 1mHz continuous with biofreeze added to right shoulder    ionto hybresis 2.0 cc dexmeth @ 80 ma.min      Shoulder flexion 95 active with no compensation      Patient Education and Home Exercises     Home Exercises Provided and Patient Education Provided     Education provided:   - HEP review    Written Home Exercises Provided: Patient instructed to cont prior HEP. Exercises were reviewed and Sheri was able to demonstrate them prior to the end of the  session.  Sheri demonstrated good  understanding of the education provided. See EMR under Patient Instructions for exercises provided during therapy sessions    ASSESSMENT     Patient did well with new activities today. Pt with verbalized good understanding of ionto wear and removal.     Sheri Is progressing well towards her goals.   Pt prognosis is Good.     Pt will continue to benefit from skilled outpatient physical therapy to address the deficits listed in the problem list box on initial evaluation, provide pt/family education and to maximize pt's level of independence in the home and community environment.     Pt's spiritual, cultural and educational needs considered and pt agreeable to plan of care and goals.     Anticipated barriers to physical therapy: previous repair    Goals: 1. Patient will be able to have shoulder flexion to 90, external rotation to 20 and internal rotation to right hip  2. Patient will be able to sleep through the night without waking from pain in her own bed  3. Patient will be able to dress/bathe self independently     Long Term Goals: 8 weeks   1. Patient will have right shoulder flexion to 120, external rotation to 50 and internal rotation to L3  2. Patient will complete activities of daily living independently with 2/10 pain  3. Patient will have normal scapular stability of the right with no compensation/deviations during range of motion  4. Patient will have 4/5 right manual muscle test        Plan     Progress stability, strengthening and range of motion in pain free range.     Updated Certification Period: 2/9/2022 to 2/29/22  Recommended Treatment Plan: 2 times per week for 8 weeks: Electrical Stimulation IFC/Premod, Iontophoresis (with Dex), Manual Therapy, Moist Heat/ Ice, Neuromuscular Re-ed, Patient Education, Therapeutic Exercise and Ultrasound  Other Recommendations: none     Maegan Gillette, PTA  2/9/2022

## 2022-02-15 ENCOUNTER — HOSPITAL ENCOUNTER (OUTPATIENT)
Dept: RADIOLOGY | Facility: HOSPITAL | Age: 57
Discharge: HOME | End: 2022-02-15
Attending: RADIOLOGY
Payer: COMMERCIAL

## 2022-02-15 ENCOUNTER — HOSPITAL ENCOUNTER (OUTPATIENT)
Dept: RADIOLOGY | Facility: HOSPITAL | Age: 57
Discharge: HOME | End: 2022-02-15
Payer: COMMERCIAL

## 2022-02-15 VITALS — BODY MASS INDEX: 32.96 KG/M2 | HEIGHT: 67 IN | WEIGHT: 210 LBS

## 2022-02-15 DIAGNOSIS — Z12.31 VISIT FOR SCREENING MAMMOGRAM: ICD-10-CM

## 2022-02-15 DIAGNOSIS — R92.8 ABNORMAL MAMMOGRAM: ICD-10-CM

## 2022-02-15 PROCEDURE — 77067 SCR MAMMO BI INCL CAD: CPT | Mod: TC

## 2022-02-15 PROCEDURE — 77067 SCR MAMMO BI INCL CAD: CPT | Mod: 26,,, | Performed by: RADIOLOGY

## 2022-02-15 PROCEDURE — 77067 MAMMO DIGITAL SCREENING BILAT: ICD-10-PCS | Mod: 26,,, | Performed by: RADIOLOGY

## 2022-02-16 ENCOUNTER — CLINICAL SUPPORT (OUTPATIENT)
Dept: REHABILITATION | Facility: HOSPITAL | Age: 57
End: 2022-02-16
Attending: ORTHOPAEDIC SURGERY
Payer: MEDICARE

## 2022-02-16 DIAGNOSIS — M25.60 DECREASED RANGE OF MOTION: ICD-10-CM

## 2022-02-16 DIAGNOSIS — R52 PAIN: ICD-10-CM

## 2022-02-16 DIAGNOSIS — R53.1 DECREASED STRENGTH: Primary | ICD-10-CM

## 2022-02-16 PROCEDURE — 97140 MANUAL THERAPY 1/> REGIONS: CPT

## 2022-02-16 PROCEDURE — 97012 MECHANICAL TRACTION THERAPY: CPT

## 2022-02-16 PROCEDURE — 97110 THERAPEUTIC EXERCISES: CPT

## 2022-02-16 NOTE — PROGRESS NOTES
[facility]   Physical Therapy Treatment Note    Name: Sheri Ho  Clinic Number: [id number]    Therapy Diagnosis:   Encounter Diagnoses   Name Primary?    Decreased strength Yes    Decreased range of motion     Pain      Physician: Cain Treviño MD    Visit Date: 2/16/2022  Physician Orders: PT Eval and Treat   Medical Diagnosis from Referral: S/P right rotator cuff repair  Evaluation Date: 12/22/2021  Authorization Period Expiration: 12/15/22  Plan of Care Expiration: 2/29/22  Visit # / Visits authorized: 12 (unlimited)    Time In: 835am  Time Out:  923am     Total Billable Time:  38  minutes    SUBJECTIVE     Pt reports: Did okay after last session. Some days I feel like I am making gains and some days not. Doing exercises at home every day with the stick and the 2lb weight and green/blue band. The pain seems to go from the shoulder into the neck.     She was compliant with home exercise program.  Response to previous treatment: relief for several hours  Functional change: can't use RUE    Pain: 5/10 with no pain medication  Location: right shoulder      OBJECTIVE     Objective Measures updated at progress report unless specified.     Treatment     Sheri received the treatments listed below:    UBE x 5 min   SL external rotation 2lb x 30  PROM x 10 min  Scaption stretch x 10  Bilateral shoulder extension blue x 30  Passive SL upper trapezius stretch (patient states this felt good)  Cybex rows 4pl x 30    Cervical mechanical traction x 12 min 45/20 rest 22lb pull      (not today)  Pulleys x 5 min extension isometrics at 30 and 90 degrees ABDUCTION x 20 with 3 sec hold each NOT THIS VISIT   SL EXTERNAL ROTATION x 30  SL scaption x 20  'D' on door X10  Proximal circles x 30 Cw, CCW  Lat pulls #1 x 15 with 5 sec hold  Bilateral shoulder extension blue x 30 NOT THIS VISIT   US x 0 min 1.0w/cm2 1mHz continuous with biofreeze added to right shoulder  ionto hybresis 2.0 cc dexmeth @ 80  ma.min      Shoulder flexion 95 active with no compensation      Patient Education and Home Exercises     Home Exercises Provided and Patient Education Provided     Education provided:   - HEP review    Written Home Exercises Provided: Patient instructed to cont prior HEP. Exercises were reviewed and Sehri was able to demonstrate them prior to the end of the session.  Sheri demonstrated good  understanding of the education provided. See EMR under Patient Instructions for exercises provided during therapy sessions    ASSESSMENT   Patient did not have immediate relief with traction but will re-assess next session if traction brought relief throughout the past few days. Progress as able.     Sheri Is progressing well towards her goals.   Pt prognosis is Good.     Pt will continue to benefit from skilled outpatient physical therapy to address the deficits listed in the problem list box on initial evaluation, provide pt/family education and to maximize pt's level of independence in the home and community environment.     Pt's spiritual, cultural and educational needs considered and pt agreeable to plan of care and goals.     Anticipated barriers to physical therapy: previous repair    Goals: 1. Patient will be able to have shoulder flexion to 90, external rotation to 20 and internal rotation to right hip  2. Patient will be able to sleep through the night without waking from pain in her own bed  3. Patient will be able to dress/bathe self independently     Long Term Goals: 8 weeks   1. Patient will have right shoulder flexion to 120, external rotation to 50 and internal rotation to L3  2. Patient will complete activities of daily living independently with 2/10 pain  3. Patient will have normal scapular stability of the right with no compensation/deviations during range of motion  4. Patient will have 4/5 right manual muscle test      Plan     Progress stability, strengthening and range of motion in pain free range.      Updated Certification Period: 2/16/2022 to 2/29/22  Recommended Treatment Plan: 2 times per week for 8 weeks: Electrical Stimulation IFC/Premod, Iontophoresis (with Dex), Manual Therapy, Moist Heat/ Ice, Neuromuscular Re-ed, Patient Education, Therapeutic Exercise and Ultrasound  Other Recommendations: none     JONATHAN BREWSTER, PT  2/16/2022

## 2022-02-21 ENCOUNTER — CLINICAL SUPPORT (OUTPATIENT)
Dept: REHABILITATION | Facility: HOSPITAL | Age: 57
End: 2022-02-21
Attending: ORTHOPAEDIC SURGERY
Payer: MEDICARE

## 2022-02-21 DIAGNOSIS — R53.1 DECREASED STRENGTH: Primary | ICD-10-CM

## 2022-02-21 DIAGNOSIS — R52 PAIN: ICD-10-CM

## 2022-02-21 DIAGNOSIS — M25.60 DECREASED RANGE OF MOTION: ICD-10-CM

## 2022-02-21 PROCEDURE — 97033 APP MDLTY 1+IONTPHRSIS EA 15: CPT | Mod: CQ

## 2022-02-21 PROCEDURE — 97112 NEUROMUSCULAR REEDUCATION: CPT | Mod: CQ

## 2022-02-21 PROCEDURE — 97110 THERAPEUTIC EXERCISES: CPT | Mod: CQ

## 2022-02-21 PROCEDURE — 97140 MANUAL THERAPY 1/> REGIONS: CPT | Mod: CQ

## 2022-02-21 NOTE — PROGRESS NOTES
[facility]   Physical Therapy Treatment Note    Name: Sheri Ho  Clinic Number: [id number]    Therapy Diagnosis:   No diagnosis found.  Physician: Cain Treviño MD    Visit Date: 2/21/2022  Physician Orders: PT Eval and Treat   Medical Diagnosis from Referral: S/P right rotator cuff repair  Evaluation Date: 12/22/2021  Authorization Period Expiration: 12/15/22  Plan of Care Expiration: 2/29/22  Visit # / Visits authorized: 13 (unlimited)    Time In: 837  Time Out:  923am     Total Billable Time:  46 minutes    SUBJECTIVE     Pt reports: Doing ok, won't complain. Weather affecting pain all over today.    She was compliant with home exercise program.  Response to previous treatment: headache  Functional change: can't use RUE    Pain: 5/10 with no pain medication  Location: right shoulder      OBJECTIVE     Objective Measures updated at progress report unless specified.     Treatment     Sheri received the treatments listed below:    UBE x 5 min   Pulleys x 3 min  Extension isometrics at 30 and 90 degrees ABDUCTION x 10 with 3 sec hold  tricep kick ups 2# 10  Punch up 2# x10  SL external rotation 2lb x 30  SL horizontal ABDUCTION x15  Scaption stretch x 10  Bilateral shoulder extension blue x 30 THOM   Cybex rows 4pl x 30  Lat pulls #1 x10 with 5 sec hold            ionto hybresis 2.0 cc dexmeth @ 80 ma.min      Shoulder flexion 95 active with no compensation      Patient Education and Home Exercises     Home Exercises Provided and Patient Education Provided     Education provided:   - HEP review    Written Home Exercises Provided: Patient instructed to cont prior HEP. Exercises were reviewed and Sheri was able to demonstrate them prior to the end of the session.  Sheri demonstrated good  understanding of the education provided. See EMR under Patient Instructions for exercises provided during therapy sessions    ASSESSMENT   Patient challenged with new exercises today but no c/o pain. Pt  does request ionto patch stating it had really helped last time.    Sheri Is progressing well towards her goals.   Pt prognosis is Good.     Pt will continue to benefit from skilled outpatient physical therapy to address the deficits listed in the problem list box on initial evaluation, provide pt/family education and to maximize pt's level of independence in the home and community environment.     Pt's spiritual, cultural and educational needs considered and pt agreeable to plan of care and goals.     Anticipated barriers to physical therapy: previous repair    Goals: 1. Patient will be able to have shoulder flexion to 90, external rotation to 20 and internal rotation to right hip  2. Patient will be able to sleep through the night without waking from pain in her own bed  3. Patient will be able to dress/bathe self independently     Long Term Goals: 8 weeks   1. Patient will have right shoulder flexion to 120, external rotation to 50 and internal rotation to L3  2. Patient will complete activities of daily living independently with 2/10 pain  3. Patient will have normal scapular stability of the right with no compensation/deviations during range of motion  4. Patient will have 4/5 right manual muscle test      Plan     Progress stability, strengthening and range of motion in pain free range.     Updated Certification Period: 2/21/2022 to 2/29/22  Recommended Treatment Plan: 2 times per week for 8 weeks: Electrical Stimulation IFC/Premod, Iontophoresis (with Dex), Manual Therapy, Moist Heat/ Ice, Neuromuscular Re-ed, Patient Education, Therapeutic Exercise and Ultrasound  Other Recommendations: none     Maegan Gillette, PTA  2/21/2022

## 2022-02-23 ENCOUNTER — CLINICAL SUPPORT (OUTPATIENT)
Dept: REHABILITATION | Facility: HOSPITAL | Age: 57
End: 2022-02-23
Attending: ORTHOPAEDIC SURGERY
Payer: MEDICARE

## 2022-02-23 DIAGNOSIS — R52 PAIN: ICD-10-CM

## 2022-02-23 DIAGNOSIS — M25.60 DECREASED RANGE OF MOTION: ICD-10-CM

## 2022-02-23 DIAGNOSIS — R53.1 DECREASED STRENGTH: Primary | ICD-10-CM

## 2022-02-23 PROCEDURE — 97140 MANUAL THERAPY 1/> REGIONS: CPT | Mod: CQ

## 2022-02-23 PROCEDURE — 97112 NEUROMUSCULAR REEDUCATION: CPT | Mod: CQ

## 2022-02-23 PROCEDURE — 97110 THERAPEUTIC EXERCISES: CPT | Mod: CQ

## 2022-02-23 NOTE — PROGRESS NOTES
[facility]   Physical Therapy Treatment Note    Name: Sheri Ho  Clinic Number: [id number]    Therapy Diagnosis:   Encounter Diagnoses   Name Primary?    Decreased strength Yes    Decreased range of motion     Pain      Physician: Cain Treviño MD    Visit Date: 2/23/2022  Physician Orders: PT Eval and Treat   Medical Diagnosis from Referral: S/P right rotator cuff repair  Evaluation Date: 12/22/2021  Authorization Period Expiration: 12/15/22  Plan of Care Expiration: 2/29/22  Visit # / Visits authorized: 14 (unlimited)    Time In: 837  Time Out:  923am     Total Billable Time:  46 minutes    SUBJECTIVE     Pt reports: Shoulder getting better    She was compliant with home exercise program.  Response to previous treatment: no problems  Functional change: can't use RUE    Pain: 5/10 with no pain medication  Location: right shoulder      OBJECTIVE     Objective Measures updated at progress report unless specified.     Treatment     Sheri received the treatments listed below:    UBE x 5 min   Pulleys x 3 min  Extension isometrics at 30 and 90 degrees ABDUCTION x 10 with 3 sec hold  tricep kick ups 2# 10  Punch up 2# x10  SL external rotation 2lb x 30  SL horizontal ABDUCTION x15  Scaption stretch x 10  Bilateral shoulder extension blue x 30 THOM /ntv   Cybex rows 4pl x 30  Lat pulls #1.5 x 20 with 5 sec hold            ionto hybresis 2.0 cc dexmeth @ 80 ma.min NOT THIS VISIT       Shoulder flexion 95 active with no compensation      Patient Education and Home Exercises     Home Exercises Provided and Patient Education Provided     Education provided:   - HEP review    Written Home Exercises Provided: Patient instructed to cont prior HEP. Exercises were reviewed and Sheri was able to demonstrate them prior to the end of the session.  Sheri demonstrated good  understanding of the education provided. See EMR under Patient Instructions for exercises provided during therapy  sessions    ASSESSMENT   Patient with better quality of shoulder ROM for end range flexion and IR    Sheri Is progressing well towards her goals.   Pt prognosis is Good.     Pt will continue to benefit from skilled outpatient physical therapy to address the deficits listed in the problem list box on initial evaluation, provide pt/family education and to maximize pt's level of independence in the home and community environment.     Pt's spiritual, cultural and educational needs considered and pt agreeable to plan of care and goals.     Anticipated barriers to physical therapy: previous repair    Goals: 1. Patient will be able to have shoulder flexion to 90, external rotation to 20 and internal rotation to right hip  2. Patient will be able to sleep through the night without waking from pain in her own bed  3. Patient will be able to dress/bathe self independently     Long Term Goals: 8 weeks   1. Patient will have right shoulder flexion to 120, external rotation to 50 and internal rotation to L3  2. Patient will complete activities of daily living independently with 2/10 pain  3. Patient will have normal scapular stability of the right with no compensation/deviations during range of motion  4. Patient will have 4/5 right manual muscle test      Plan     Progress stability, strengthening and range of motion in pain free range.     Updated Certification Period: 2/23/2022 to 2/29/22  Recommended Treatment Plan: 2 times per week for 8 weeks: Electrical Stimulation IFC/Premod, Iontophoresis (with Dex), Manual Therapy, Moist Heat/ Ice, Neuromuscular Re-ed, Patient Education, Therapeutic Exercise and Ultrasound  Other Recommendations: none     Maegan Gillette, PTA  2/23/2022

## 2022-02-28 ENCOUNTER — CLINICAL SUPPORT (OUTPATIENT)
Dept: REHABILITATION | Facility: HOSPITAL | Age: 57
End: 2022-02-28
Attending: ORTHOPAEDIC SURGERY
Payer: MEDICARE

## 2022-02-28 DIAGNOSIS — M25.60 DECREASED RANGE OF MOTION: ICD-10-CM

## 2022-02-28 DIAGNOSIS — R52 PAIN: ICD-10-CM

## 2022-02-28 DIAGNOSIS — R53.1 DECREASED STRENGTH: Primary | ICD-10-CM

## 2022-02-28 PROCEDURE — 97033 APP MDLTY 1+IONTPHRSIS EA 15: CPT | Mod: CQ

## 2022-02-28 PROCEDURE — 97140 MANUAL THERAPY 1/> REGIONS: CPT | Mod: CQ

## 2022-02-28 PROCEDURE — 97110 THERAPEUTIC EXERCISES: CPT | Mod: CQ

## 2022-02-28 PROCEDURE — 97112 NEUROMUSCULAR REEDUCATION: CPT | Mod: CQ

## 2022-02-28 NOTE — PROGRESS NOTES
[facility]   Physical Therapy Treatment Note    Name: Sheri Ho  Clinic Number: [id number]    Therapy Diagnosis:   Encounter Diagnoses   Name Primary?    Decreased strength Yes    Decreased range of motion     Pain      Physician: Cain Treviño MD    Visit Date: 2/28/2022  Physician Orders: PT Eval and Treat   Medical Diagnosis from Referral: S/P right rotator cuff repair  Evaluation Date: 12/22/2021  Authorization Period Expiration: 12/15/22  Plan of Care Expiration: 2/29/22  Visit # / Visits authorized: 14 (unlimited)    Time In: 835  Time Out:  920    Total Billable Time:  45 minutes    SUBJECTIVE     Pt reports: seeing pain treatment today for med refills    She was compliant with home exercise program.  Response to previous treatment: no problems  Functional change: not perfroming ADLS around house work    Pain: 5/10 with no pain medication  Location: right shoulder      OBJECTIVE     Objective Measures updated at progress report unless specified.     Treatment     Sheri received the treatments listed below:    UBE x 5 min   Pulleys x 3 min  Extension isometrics at 30 and 90 degrees ABDUCTION x 10 with 3 sec hold  tricep kick ups 2# 10  Bicep curls 2# x 20  Punch up 2# x10  SL external rotation 2lb x 30  SL horizontal ABDUCTION x15  Scaption stretch x 10  Bilateral shoulder extension blue x 30 THOM /ntv   Cybex rows 4pl x 30  Lat pulls #1.5 x 20 with 5 sec hold            ionto hybresis 2.0 cc dexmeth @ 80 ma.min        Shoulder flexion 95 active with no compensation      Patient Education and Home Exercises     Home Exercises Provided and Patient Education Provided     Education provided:   - HEP review    Written Home Exercises Provided: Patient instructed to cont prior HEP. Exercises were reviewed and Sheri was able to demonstrate them prior to the end of the session.  Sheri demonstrated good  understanding of the education provided. See EMR under Patient Instructions for  exercises provided during therapy sessions    ASSESSMENT   Patient with some scapular dysfunction at top range flexion.    Sheri Is progressing well towards her goals.   Pt prognosis is Good.     Pt will continue to benefit from skilled outpatient physical therapy to address the deficits listed in the problem list box on initial evaluation, provide pt/family education and to maximize pt's level of independence in the home and community environment.     Pt's spiritual, cultural and educational needs considered and pt agreeable to plan of care and goals.     Anticipated barriers to physical therapy: previous repair    Goals: 1. Patient will be able to have shoulder flexion to 90, external rotation to 20 and internal rotation to right hip  2. Patient will be able to sleep through the night without waking from pain in her own bed  3. Patient will be able to dress/bathe self independently     Long Term Goals: 8 weeks   1. Patient will have right shoulder flexion to 120, external rotation to 50 and internal rotation to L3  2. Patient will complete activities of daily living independently with 2/10 pain  3. Patient will have normal scapular stability of the right with no compensation/deviations during range of motion  4. Patient will have 4/5 right manual muscle test      Plan     Progress stability, strengthening and range of motion in pain free range.     Updated Certification Period: 2/28/2022 to 2/29/22  Recommended Treatment Plan: 2 times per week for 8 weeks: Electrical Stimulation IFC/Premod, Iontophoresis (with Dex), Manual Therapy, Moist Heat/ Ice, Neuromuscular Re-ed, Patient Education, Therapeutic Exercise and Ultrasound  Other Recommendations: none     Maegan Gillette, PTA  2/28/2022

## 2022-03-03 ENCOUNTER — CLINICAL SUPPORT (OUTPATIENT)
Dept: REHABILITATION | Facility: HOSPITAL | Age: 57
End: 2022-03-03
Attending: ORTHOPAEDIC SURGERY
Payer: MEDICARE

## 2022-03-03 DIAGNOSIS — R52 PAIN: ICD-10-CM

## 2022-03-03 DIAGNOSIS — M25.60 DECREASED RANGE OF MOTION: ICD-10-CM

## 2022-03-03 DIAGNOSIS — R53.1 DECREASED STRENGTH: Primary | ICD-10-CM

## 2022-03-03 PROCEDURE — 97112 NEUROMUSCULAR REEDUCATION: CPT | Mod: CQ

## 2022-03-03 PROCEDURE — 97110 THERAPEUTIC EXERCISES: CPT | Mod: CQ

## 2022-03-03 PROCEDURE — 97140 MANUAL THERAPY 1/> REGIONS: CPT | Mod: CQ

## 2022-03-03 NOTE — PROGRESS NOTES
[facility]   Physical Therapy Updated Plan of Care    Name: Sheri Ho  Clinic Number: [id number]    Therapy Diagnosis:   Encounter Diagnoses   Name Primary?    Decreased strength Yes    Decreased range of motion     Pain      Physician: Cain Treviño MD    Visit Date: 3/3/2022  Physician Orders: PT Eval and Treat   Medical Diagnosis from Referral: S/P right rotator cuff repair  Evaluation Date: 2021  Authorization Period Expiration: 12/15/22  Plan of Care Expiration: 3/31/22  Visit # / Visits authorized: 16 (unlimited)    Time In: 1430  Time Out:  1515  Total Billable Time: 45  minutes    SUBJECTIVE     Pt reports: seeing pain treatment today for med refills    She was compliant with home exercise program.  Response to previous treatment: no problems  Functional change: not perfroming ADLS around house work    Pain: 5/10 with  pain medication  Location: right shoulder      OBJECTIVE     Objective Measures updated at progress report unless specified.   AROM flexion in standin degrees, EXTERNAL ROTATION at 90 abduction: 82, INTERNAL ROTATION at 90 abduction: 70  Treatment     Sheri received the treatments listed below:    UBE x 5 min   Pulleys x 3 min  Extension isometrics at 30 and 90 degrees ABDUCTION x 10 with 3 sec hold  tricep kick ups 2# 10 NOT THIS VISIT   Bicep curls 2# x 20 NOT THIS VISIT   Punch up 2# x10 NOT THIS VISIT   SL external rotation MRE x 30  SL horizontal ABDUCTION x15 NOT THIS VISIT   Scaption stretch x 10  Bilateral shoulder extension blue x 30 THOM /ntv   Cybex rows 4pl x 30  Lat pulls #2 x 20 with 5 sec hold  Standing AROM flexion with A for scapula x 10  Wall slides flexion with red x 10 with 5 sec hold          ionto hybresis 2.0 cc dexmeth @ 80 ma.min  NOT THIS VISIT             Patient Education and Home Exercises     Home Exercises Provided and Patient Education Provided     Education provided:   - HEP review    Written Home Exercises  Provided: Patient instructed to cont prior HEP. Exercises were reviewed and Sheri was able to demonstrate them prior to the end of the session.  Sheri demonstrated good  understanding of the education provided. See EMR under Patient Instructions for exercises provided during therapy sessions    ASSESSMENT   Patient with some scapular dysfunction at top range flexion. AAROM flexion to about 150 but with lack of upward rotation of scapula without assist.    Sheri Is progressing well towards her goals.   Pt prognosis is Good.     Pt will continue to benefit from skilled outpatient physical therapy to address the deficits listed in the problem list box on initial evaluation, provide pt/family education and to maximize pt's level of independence in the home and community environment.     Pt's spiritual, cultural and educational needs considered and pt agreeable to plan of care and goals.     Anticipated barriers to physical therapy: previous repair    Goals: 1. Patient will be able to have shoulder flexion to 90, external rotation to 20 and internal rotation to right hip(Goal Met)  2. Patient will be able to sleep through the night without waking from pain in her own bed (Goal Met)  3. Patient will be able to dress/bathe self independently (Goal Met)     Long Term Goals: 8 weeks (Goals Partially Met)  1. Patient will have right shoulder flexion to 120, external rotation to 50 and internal rotation to L3  2. Patient will complete activities of daily living independently with 2/10 pain  3. Patient will have normal scapular stability of the right with no compensation/deviations during range of motion  4. Patient will have 4/5 right manual muscle test    Reasons for Recertification of Therapy: to continue to progress toward goals     Plan       Updated Certification Period: 2/29/22 to 3/31/22  Recommended Treatment Plan: 2 times per week for 8 weeks: Electrical Stimulation IFC/Premod, Iontophoresis (with Dex), Manual Therapy,  Moist Heat/ Ice, Neuromuscular Re-ed, Patient Education, Therapeutic Exercise and Ultrasound  Other Recommendations: none     JONATHAN BREWSTER, PT  3/7/2022      I CERTIFY THE NEED FOR THESE SERVICES FURNISHED UNDER THIS PLAN OF TREATMENT AND WHILE UNDER MY CARE.    Physician's comments:      Physician's Signature: ___________________________________________________

## 2022-03-18 ENCOUNTER — CLINICAL SUPPORT (OUTPATIENT)
Dept: REHABILITATION | Facility: HOSPITAL | Age: 57
End: 2022-03-18
Attending: ORTHOPAEDIC SURGERY
Payer: MEDICARE

## 2022-03-18 DIAGNOSIS — R52 PAIN: ICD-10-CM

## 2022-03-18 DIAGNOSIS — R53.1 DECREASED STRENGTH: Primary | ICD-10-CM

## 2022-03-18 DIAGNOSIS — M25.60 DECREASED RANGE OF MOTION: ICD-10-CM

## 2022-03-18 PROCEDURE — 97112 NEUROMUSCULAR REEDUCATION: CPT

## 2022-03-18 PROCEDURE — 97140 MANUAL THERAPY 1/> REGIONS: CPT

## 2022-03-18 PROCEDURE — 97110 THERAPEUTIC EXERCISES: CPT

## 2022-03-18 NOTE — PROGRESS NOTES
[facility]   Physical Therapy Treatment Note    Name: Sheri Ho  Clinic Number: [id number]    Therapy Diagnosis:   Encounter Diagnoses   Name Primary?    Decreased strength Yes    Decreased range of motion     Pain      Physician: Cain Treviño MD    Visit Date: 3/18/2022  Physician Orders: PT Eval and Treat   Medical Diagnosis from Referral: S/P right rotator cuff repair  Evaluation Date: 2021  Authorization Period Expiration: 12/15/22  Plan of Care Expiration: 3/31/22  Visit # / Visits authorized: 17 (unlimited)    Time In: 900am  Time Out:  948am  Total Billable Time:   48 minutes    SUBJECTIVE     Pt reports: Stiffness and ache. Could be weather. Doing what I can at home with pulley and the cane and with weights.     She was compliant with home exercise program.  Response to previous treatment: no problems  Functional change: not perfroming ADLS around house work    Pain: 5/10 with  pain medication  Location: right shoulder      OBJECTIVE     Objective Measures updated at progress report unless specified.   AROM flexion in standin degrees, EXTERNAL ROTATION at 90 abduction: 82, INTERNAL ROTATION at 90 abduction: 70  Treatment     Sheri received the treatments listed below:      UBE x 5 min   Pulleys x 3 min  Cybex rows 4pl x 30  Cybex shoulder press 1pl x 20  Lat pull down 2pl x 30  Cable sitting flexion/extension 3pl x 20  Bilateral shoulder extension blue x 30  Sleeper stretch x 10 with 10 sec hold  PROM x 10 min  SL scaption stretch passive x 10      Patient Education and Home Exercises     Home Exercises Provided and Patient Education Provided     Education provided:   - HEP review    Written Home Exercises Provided: Patient instructed to cont prior HEP. Exercises were reviewed and Sheri was able to demonstrate them prior to the end of the session.  Sheri demonstrated good  understanding of the education provided. See EMR under Patient Instructions for  exercises provided during therapy sessions    ASSESSMENT     Patient has good passive range but slightly limited with passive internal rotation stretch. Taught patient the internal rotation sleeper stretch for home and added to home exercise program for carryover. Discussed dry needling for the patient and she decided to not complete during today's session but will think about it for the next session secondary to patient stating her trapezius and neck seem to hurt possibly from the muscle guarding from the shoulder.     Sheri Is progressing well towards her goals.   Pt prognosis is Good.     Pt will continue to benefit from skilled outpatient physical therapy to address the deficits listed in the problem list box on initial evaluation, provide pt/family education and to maximize pt's level of independence in the home and community environment.     Pt's spiritual, cultural and educational needs considered and pt agreeable to plan of care and goals.     Anticipated barriers to physical therapy: previous repair    Goals: 1. Patient will be able to have shoulder flexion to 90, external rotation to 20 and internal rotation to right hip(Goal Met)  2. Patient will be able to sleep through the night without waking from pain in her own bed (Goal Met)  3. Patient will be able to dress/bathe self independently (Goal Met)     Long Term Goals: 8 weeks (Goals Partially Met)  1. Patient will have right shoulder flexion to 120, external rotation to 50 and internal rotation to L3  2. Patient will complete activities of daily living independently with 2/10 pain  3. Patient will have normal scapular stability of the right with no compensation/deviations during range of motion  4. Patient will have 4/5 right manual muscle test      Plan       Updated Certification Period: 2/29/22 to 3/31/22  Recommended Treatment Plan: 2 times per week for 8 weeks: Electrical Stimulation IFC/Premod, Iontophoresis (with Dex), Manual Therapy, Moist Heat/  Ice, Neuromuscular Re-ed, Patient Education, Therapeutic Exercise and Ultrasound  Other Recommendations: none     JONATHAN BREWSTER, PT  3/18/2022

## 2022-03-22 ENCOUNTER — CLINICAL SUPPORT (OUTPATIENT)
Dept: REHABILITATION | Facility: HOSPITAL | Age: 57
End: 2022-03-22
Attending: ORTHOPAEDIC SURGERY
Payer: MEDICARE

## 2022-03-22 DIAGNOSIS — R52 PAIN: ICD-10-CM

## 2022-03-22 DIAGNOSIS — R53.1 DECREASED STRENGTH: Primary | ICD-10-CM

## 2022-03-22 DIAGNOSIS — M25.60 DECREASED RANGE OF MOTION: ICD-10-CM

## 2022-03-22 PROCEDURE — 97112 NEUROMUSCULAR REEDUCATION: CPT | Mod: CQ

## 2022-03-22 PROCEDURE — 97110 THERAPEUTIC EXERCISES: CPT | Mod: CQ

## 2022-03-22 PROCEDURE — 97140 MANUAL THERAPY 1/> REGIONS: CPT | Mod: CQ

## 2022-03-22 NOTE — PROGRESS NOTES
[facility]   Physical Therapy Treatment Note    Name: Sheri Ho  Clinic Number: [id number]    Therapy Diagnosis:   Encounter Diagnoses   Name Primary?    Decreased strength Yes    Decreased range of motion     Pain      Physician: Cain Treviño MD    Visit Date: 3/22/2022  Physician Orders: PT Eval and Treat   Medical Diagnosis from Referral: S/P right rotator cuff repair  Evaluation Date: 2021  Authorization Period Expiration: 12/15/22  Plan of Care Expiration: 3/31/22  Visit # / Visits authorized: 18 (unlimited)    Time In: 0840  Time Out:  930  Total Billable Time:   50 minutes    SUBJECTIVE     Pt reports: Stiffness and ache. Could be weather. Doing what I can at home with pulley and the cane and with weights.     She was compliant with home exercise program.  Response to previous treatment: no problems  Functional change: not perfroming ADLS around house work    Pain: 5/10 with  pain medication  Location: right shoulder      OBJECTIVE     Objective Measures updated at progress report unless specified.   AROM flexion in standin degrees, EXTERNAL ROTATION at 90 abduction: 82, INTERNAL ROTATION at 90 abduction: 70  Treatment     Sheri received the treatments listed below:      UBE x 5 min   Pulleys x 3 min NOT THIS VISIT   Wall boxes green x 15  Cybex rows 4pl x 30  Cybex shoulder press 1pl x 30  Lat pull down 2pl x 30  Cable sitting flexion/extension 3pl x 20   press #2 x 10  Shoulder flexion and abduction in front of mirror 2# x 10 each  Towel circles on wall CW, CCW x 30 each 2 x  Bilateral shoulder extension blue x 30 NOT THIS VISIT   Sleeper stretch x 10 with 10 sec hold  PROM x 10 min  SL scaption stretch passive x 10      Patient Education and Home Exercises     Home Exercises Provided and Patient Education Provided     Education provided:   - HEP review    Written Home Exercises Provided: Patient instructed to cont prior HEP. Exercises were reviewed  and Sheri was able to demonstrate them prior to the end of the session.  Sheri demonstrated good  understanding of the education provided. See EMR under Patient Instructions for exercises provided during therapy sessions    ASSESSMENT     Patient with limitation in INTERNAL ROTATION and adduction behind back. Muscle spasm in right UT.    Sheri Is progressing well towards her goals.   Pt prognosis is Good.     Pt will continue to benefit from skilled outpatient physical therapy to address the deficits listed in the problem list box on initial evaluation, provide pt/family education and to maximize pt's level of independence in the home and community environment.     Pt's spiritual, cultural and educational needs considered and pt agreeable to plan of care and goals.     Anticipated barriers to physical therapy: previous repair    Goals: 1. Patient will be able to have shoulder flexion to 90, external rotation to 20 and internal rotation to right hip(Goal Met)  2. Patient will be able to sleep through the night without waking from pain in her own bed (Goal Met)  3. Patient will be able to dress/bathe self independently (Goal Met)     Long Term Goals: 8 weeks (Goals Partially Met)  1. Patient will have right shoulder flexion to 120, external rotation to 50 and internal rotation to L3  2. Patient will complete activities of daily living independently with 2/10 pain  3. Patient will have normal scapular stability of the right with no compensation/deviations during range of motion  4. Patient will have 4/5 right manual muscle test      Plan       Updated Certification Period: 2/29/22 to 3/31/22  Recommended Treatment Plan: 2 times per week for 8 weeks: Electrical Stimulation IFC/Premod, Iontophoresis (with Dex), Manual Therapy, Moist Heat/ Ice, Neuromuscular Re-ed, Patient Education, Therapeutic Exercise and Ultrasound  Other Recommendations: none     Maegan Gillette, PTA  3/22/2022

## 2022-03-25 ENCOUNTER — CLINICAL SUPPORT (OUTPATIENT)
Dept: REHABILITATION | Facility: HOSPITAL | Age: 57
End: 2022-03-25
Attending: ORTHOPAEDIC SURGERY
Payer: MEDICARE

## 2022-03-25 DIAGNOSIS — R53.1 DECREASED STRENGTH: Primary | ICD-10-CM

## 2022-03-25 DIAGNOSIS — R52 PAIN: ICD-10-CM

## 2022-03-25 DIAGNOSIS — M25.60 DECREASED RANGE OF MOTION: ICD-10-CM

## 2022-03-25 PROCEDURE — 97110 THERAPEUTIC EXERCISES: CPT

## 2022-03-25 PROCEDURE — 97112 NEUROMUSCULAR REEDUCATION: CPT

## 2022-03-25 PROCEDURE — 97140 MANUAL THERAPY 1/> REGIONS: CPT

## 2022-03-25 NOTE — PROGRESS NOTES
[facility]   Physical Therapy Treatment Note    Name: Sheri Ho  Clinic Number: [id number]    Therapy Diagnosis:   Encounter Diagnoses   Name Primary?    Decreased strength Yes    Decreased range of motion     Pain      Physician: Cain Treviño MD    Visit Date: 3/25/2022  Physician Orders: PT Eval and Treat   Medical Diagnosis from Referral: S/P right rotator cuff repair  Evaluation Date: 2021  Authorization Period Expiration: 12/15/22  Plan of Care Expiration: 3/31/22  Visit # / Visits authorized: 19 (unlimited)    Time In: 0805am  Time Out:  850am   Total Billable Time:   45 minutes    SUBJECTIVE     Pt reports: Doing exercises at home. Still hurts when lifting arm overhead. It hurts in the front and on top of the shoulder    She was compliant with home exercise program.  Response to previous treatment: no problems  Functional change: not perfroming ADLS around house work    Pain: 5/10 with  pain medication  Location: right shoulder      OBJECTIVE     Objective Measures updated at progress report unless specified.   AROM flexion in standin degrees, EXTERNAL ROTATION at 90 abduction: 82, INTERNAL ROTATION at 90 abduction: 70  Treatment     Sheri received the treatments listed below:      Therapeutic Exercise x 12 min  UBE x 5 min   Supine cane flexion 2lb x 10 with 5 sec hold  Sleeper stretch internal rotation     Neuromuscular Re-education x 23 min  Scapular retractions x 30  SL external rotation 2lb x 30  Bilateral prone shoulder extension 1lb x 30  Unilateral prone shoulder extension 1lb x 30  Unilateral prone shoulder row 1lb x     Manual  PROM x 10 min          (not today)  Pulleys x 3 min NOT THIS VISIT   Wall boxes green x 15  Cybex rows 4pl x 30  Cybex shoulder press 1pl x 30  Lat pull down 2pl x 30  Cable sitting flexion/extension 3pl x 20   press #2 x 10  Shoulder flexion and abduction in front of mirror 2# x 10 each  Towel circles on wall CW, CCW  x 30 each 2 x  Bilateral shoulder extension blue x 30 NOT THIS VISIT   Sleeper stretch x 10 with 10 sec hold  PROM x 10 min  SL scaption stretch passive x 10      Patient Education and Home Exercises     Home Exercises Provided and Patient Education Provided     Education provided:   - HEP review    Written Home Exercises Provided: Patient instructed to cont prior HEP. Exercises were reviewed and Sheri was able to demonstrate them prior to the end of the session.  Sheri demonstrated good  understanding of the education provided. See EMR under Patient Instructions for exercises provided during therapy sessions    ASSESSMENT     Patient continues to have discomfort in the anterior shoulder and compensates with upper trapezius/deltoid during shoulder flexion. Focused on scapular stabilization to address the compensation issue. Patient has less shoulder flexion today than in past sessions. Reason unknown.     Sheri Is progressing well towards her goals.   Pt prognosis is Good.     Pt will continue to benefit from skilled outpatient physical therapy to address the deficits listed in the problem list box on initial evaluation, provide pt/family education and to maximize pt's level of independence in the home and community environment.     Pt's spiritual, cultural and educational needs considered and pt agreeable to plan of care and goals.     Anticipated barriers to physical therapy: previous repair    Goals: 1. Patient will be able to have shoulder flexion to 90, external rotation to 20 and internal rotation to right hip(Goal Met)  2. Patient will be able to sleep through the night without waking from pain in her own bed (Goal Met)  3. Patient will be able to dress/bathe self independently (Goal Met)     Long Term Goals: 8 weeks (Goals Partially Met)  1. Patient will have right shoulder flexion to 120, external rotation to 50 and internal rotation to L3  2. Patient will complete activities of daily living independently with  2/10 pain  3. Patient will have normal scapular stability of the right with no compensation/deviations during range of motion  4. Patient will have 4/5 right manual muscle test      Plan   Continue to progress toward goals         Updated Certification Period: 2/29/22 to 3/31/22  Recommended Treatment Plan: 2 times per week for 8 weeks: Electrical Stimulation IFC/Premod, Iontophoresis (with Dex), Manual Therapy, Moist Heat/ Ice, Neuromuscular Re-ed, Patient Education, Therapeutic Exercise and Ultrasound  Other Recommendations: none     JONATHAN BREWSTER, PT  3/25/2022

## 2022-03-31 ENCOUNTER — CLINICAL SUPPORT (OUTPATIENT)
Dept: REHABILITATION | Facility: HOSPITAL | Age: 57
End: 2022-03-31
Attending: ORTHOPAEDIC SURGERY
Payer: MEDICARE

## 2022-03-31 DIAGNOSIS — R53.1 DECREASED STRENGTH: Primary | ICD-10-CM

## 2022-03-31 DIAGNOSIS — M25.60 DECREASED RANGE OF MOTION: ICD-10-CM

## 2022-03-31 DIAGNOSIS — R52 PAIN: ICD-10-CM

## 2022-03-31 PROCEDURE — 97140 MANUAL THERAPY 1/> REGIONS: CPT | Mod: CQ

## 2022-03-31 PROCEDURE — 97112 NEUROMUSCULAR REEDUCATION: CPT | Mod: CQ

## 2022-03-31 NOTE — PROGRESS NOTES
[facility]   Physical Therapy Treatment Note    Name: Sheri Ho  Clinic Number: [id number]    Therapy Diagnosis:   Encounter Diagnoses   Name Primary?    Decreased strength Yes    Decreased range of motion     Pain      Physician: Cain Treviño MD    Visit Date: 3/31/2022  Physician Orders: PT Eval and Treat   Medical Diagnosis from Referral: S/P right rotator cuff repair  Evaluation Date: 2021  Authorization Period Expiration: 12/15/22  Plan of Care Expiration: 3/31/22  Visit # / Visits authorized: 20 (unlimited)    Time In: 836  Time Out: 905   Total Billable Time:   29 minutes    SUBJECTIVE     Pt reports: I have to leave early for a doctor's appointment    She was compliant with home exercise program.  Response to previous treatment: no problems  Functional change: not perfroming ADLS around house work    Pain: 3/10 with  pain medication  Location: right shoulder      OBJECTIVE     Objective Measures updated at progress report unless specified.   AROM flexion in standin degrees, EXTERNAL ROTATION at 90 abduction: 82, INTERNAL ROTATION at 90 abduction: 70  Treatment     Sheri received the treatments listed below:      Therapeutic Exercise x 12 min  UBE x 3 min   Supine cane flexion 2lb x 10 with 5 sec hold NOT THIS VISIT   Sleeper stretch internal rotation  NOT THIS VISIT   cybex row #4 x 10 each : vert, horiz, underhand      Neuromuscular Re-education x 23 min  Scapular retractions x 30 NOT THIS VISIT   scaption with 2# x 10 with 3 sec hold  SL external rotation 2lb x 30  Bilateral prone shoulder extension 1lb x 30 NOT THIS VISIT   Unilateral prone shoulder extension 1lb x 30 NOT THIS VISIT   Unilateral prone shoulder row 1lb x NOT THIS VISIT     Manual  Mobs and MFR to R shoulder for end range flexion and INTERNAL ROTATION x 10 min          (not today)  Pulleys x 3 min NOT THIS VISIT   Wall boxes green x 15  Cybex rows 4pl x 30  Cybex shoulder press 1pl x  30  Cable sitting flexion/extension 3pl x 20   press #2 x 10  Shoulder flexion and abduction in front of mirror 2# x 10 each  Towel circles on wall CW, CCW x 30 each 2 x  Bilateral shoulder extension blue x 30 NOT THIS VISIT   Sleeper stretch x 10 with 10 sec hold  SL scaption stretch passive x 10      Patient Education and Home Exercises     Home Exercises Provided and Patient Education Provided     Education provided:   - HEP review    Written Home Exercises Provided: Patient instructed to cont prior HEP. Exercises were reviewed and Sheri was able to demonstrate them prior to the end of the session.  Sheri demonstrated good  understanding of the education provided. See EMR under Patient Instructions for exercises provided during therapy sessions    ASSESSMENT     Patient lacking last bit of downward and upward scapular rotation for symmetrical shoulder flexion.    Sheri Is progressing well towards her goals.   Pt prognosis is Good.     Pt will continue to benefit from skilled outpatient physical therapy to address the deficits listed in the problem list box on initial evaluation, provide pt/family education and to maximize pt's level of independence in the home and community environment.     Pt's spiritual, cultural and educational needs considered and pt agreeable to plan of care and goals.     Anticipated barriers to physical therapy: previous repair    Goals: 1. Patient will be able to have shoulder flexion to 90, external rotation to 20 and internal rotation to right hip(Goal Met)  2. Patient will be able to sleep through the night without waking from pain in her own bed (Goal Met)  3. Patient will be able to dress/bathe self independently (Goal Met)     Long Term Goals: 8 weeks (Goals Partially Met)  1. Patient will have right shoulder flexion to 120, external rotation to 50 and internal rotation to L3  2. Patient will complete activities of daily living independently with 2/10 pain  3. Patient will have  normal scapular stability of the right with no compensation/deviations during range of motion  4. Patient will have 4/5 right manual muscle test      Plan   Continue to progress toward goals         Updated Certification Period: 2/29/22 to 3/31/22  Recommended Treatment Plan: 2 times per week for 8 weeks: Electrical Stimulation IFC/Premod, Iontophoresis (with Dex), Manual Therapy, Moist Heat/ Ice, Neuromuscular Re-ed, Patient Education, Therapeutic Exercise and Ultrasound  Other Recommendations: none     Maegan Gillette, PTA  3/31/2022

## 2022-04-01 ENCOUNTER — OFFICE VISIT (OUTPATIENT)
Dept: FAMILY MEDICINE | Facility: CLINIC | Age: 57
End: 2022-04-01
Payer: MEDICARE

## 2022-04-01 VITALS
HEART RATE: 80 BPM | HEIGHT: 67 IN | RESPIRATION RATE: 18 BRPM | WEIGHT: 201 LBS | BODY MASS INDEX: 31.55 KG/M2 | TEMPERATURE: 98 F | DIASTOLIC BLOOD PRESSURE: 90 MMHG | SYSTOLIC BLOOD PRESSURE: 140 MMHG | OXYGEN SATURATION: 98 %

## 2022-04-01 DIAGNOSIS — F33.1 MODERATE EPISODE OF RECURRENT MAJOR DEPRESSIVE DISORDER: Primary | ICD-10-CM

## 2022-04-01 DIAGNOSIS — Z13.1 SCREENING FOR DIABETES MELLITUS: ICD-10-CM

## 2022-04-01 DIAGNOSIS — R30.0 DYSURIA: ICD-10-CM

## 2022-04-01 DIAGNOSIS — Z13.220 SCREENING FOR LIPOID DISORDERS: ICD-10-CM

## 2022-04-01 DIAGNOSIS — F41.0 PANIC ATTACKS: ICD-10-CM

## 2022-04-01 DIAGNOSIS — R53.82 CHRONIC FATIGUE: ICD-10-CM

## 2022-04-01 DIAGNOSIS — Z79.899 ENCOUNTER FOR LONG-TERM (CURRENT) USE OF OTHER MEDICATIONS: ICD-10-CM

## 2022-04-01 DIAGNOSIS — I10 ESSENTIAL HYPERTENSION, BENIGN: ICD-10-CM

## 2022-04-01 DIAGNOSIS — E55.9 VITAMIN D DEFICIENCY: ICD-10-CM

## 2022-04-01 LAB
25(OH)D3 SERPL-MCNC: 50.2 NG/ML
ALBUMIN SERPL BCP-MCNC: 3.7 G/DL (ref 3.5–5)
ALBUMIN/GLOB SERPL: 1 {RATIO}
ALP SERPL-CCNC: 103 U/L (ref 46–118)
ALT SERPL W P-5'-P-CCNC: 29 U/L (ref 13–56)
ANION GAP SERPL CALCULATED.3IONS-SCNC: 9 MMOL/L (ref 7–16)
AST SERPL W P-5'-P-CCNC: 15 U/L (ref 15–37)
BASOPHILS # BLD AUTO: 0.05 K/UL (ref 0–0.2)
BASOPHILS NFR BLD AUTO: 0.7 % (ref 0–1)
BILIRUB SERPL-MCNC: 1 MG/DL (ref 0–1.2)
BILIRUB UR QL STRIP: NEGATIVE
BUN SERPL-MCNC: 7 MG/DL (ref 7–18)
BUN/CREAT SERPL: 8 (ref 6–20)
CALCIUM SERPL-MCNC: 8.9 MG/DL (ref 8.5–10.1)
CHLORIDE SERPL-SCNC: 105 MMOL/L (ref 98–107)
CHOLEST SERPL-MCNC: 175 MG/DL (ref 0–200)
CHOLEST/HDLC SERPL: 2.9 {RATIO}
CLARITY UR: CLEAR
CO2 SERPL-SCNC: 30 MMOL/L (ref 21–32)
COLOR UR: YELLOW
CREAT SERPL-MCNC: 0.86 MG/DL (ref 0.55–1.02)
DIFFERENTIAL METHOD BLD: ABNORMAL
EOSINOPHIL # BLD AUTO: 0.03 K/UL (ref 0–0.5)
EOSINOPHIL NFR BLD AUTO: 0.4 % (ref 1–4)
ERYTHROCYTE [DISTWIDTH] IN BLOOD BY AUTOMATED COUNT: 13.2 % (ref 11.5–14.5)
EST. AVERAGE GLUCOSE BLD GHB EST-MCNC: 114 MG/DL
FOLATE SERPL-MCNC: 18.1 NG/ML (ref 3.1–17.5)
GLOBULIN SER-MCNC: 3.7 G/DL (ref 2–4)
GLUCOSE SERPL-MCNC: 97 MG/DL (ref 74–106)
GLUCOSE UR STRIP-MCNC: NEGATIVE MG/DL
HBA1C MFR BLD HPLC: 6 % (ref 4.5–6.6)
HCT VFR BLD AUTO: 42.3 % (ref 38–47)
HDLC SERPL-MCNC: 61 MG/DL (ref 40–60)
HGB BLD-MCNC: 13.8 G/DL (ref 12–16)
IMM GRANULOCYTES # BLD AUTO: 0.02 K/UL (ref 0–0.04)
IMM GRANULOCYTES NFR BLD: 0.3 % (ref 0–0.4)
KETONES UR STRIP-SCNC: NEGATIVE MG/DL
LDLC SERPL CALC-MCNC: 97 MG/DL
LDLC/HDLC SERPL: 1.6 {RATIO}
LEUKOCYTE ESTERASE UR QL STRIP: NEGATIVE
LYMPHOCYTES # BLD AUTO: 2.92 K/UL (ref 1–4.8)
LYMPHOCYTES NFR BLD AUTO: 41.1 % (ref 27–41)
MAGNESIUM SERPL-MCNC: 1.9 MG/DL (ref 1.7–2.3)
MCH RBC QN AUTO: 29.6 PG (ref 27–31)
MCHC RBC AUTO-ENTMCNC: 32.6 G/DL (ref 32–36)
MCV RBC AUTO: 90.8 FL (ref 80–96)
MONOCYTES # BLD AUTO: 0.61 K/UL (ref 0–0.8)
MONOCYTES NFR BLD AUTO: 8.6 % (ref 2–6)
MPC BLD CALC-MCNC: 9.9 FL (ref 9.4–12.4)
NEUTROPHILS # BLD AUTO: 3.47 K/UL (ref 1.8–7.7)
NEUTROPHILS NFR BLD AUTO: 48.9 % (ref 53–65)
NITRITE UR QL STRIP: NEGATIVE
NONHDLC SERPL-MCNC: 114 MG/DL
NRBC # BLD AUTO: 0 X10E3/UL
NRBC, AUTO (.00): 0 %
PH UR STRIP: 7 PH UNITS
PLATELET # BLD AUTO: 359 K/UL (ref 150–400)
POTASSIUM SERPL-SCNC: 4 MMOL/L (ref 3.5–5.1)
PROT SERPL-MCNC: 7.4 G/DL (ref 6.4–8.2)
PROT UR QL STRIP: NEGATIVE
RBC # BLD AUTO: 4.66 M/UL (ref 4.2–5.4)
RBC # UR STRIP: NEGATIVE /UL
SODIUM SERPL-SCNC: 140 MMOL/L (ref 136–145)
SP GR UR STRIP: <=1.005
T4 FREE SERPL-MCNC: 0.91 NG/DL (ref 0.76–1.46)
TRIGL SERPL-MCNC: 87 MG/DL (ref 35–150)
TSH SERPL DL<=0.005 MIU/L-ACNC: 1 UIU/ML (ref 0.36–3.74)
UROBILINOGEN UR STRIP-ACNC: 0.2 MG/DL
VIT B12 SERPL-MCNC: 907 PG/ML (ref 193–986)
VLDLC SERPL-MCNC: 17 MG/DL
WBC # BLD AUTO: 7.1 K/UL (ref 4.5–11)

## 2022-04-01 PROCEDURE — 82306 VITAMIN D: ICD-10-PCS | Mod: ,,, | Performed by: CLINICAL MEDICAL LABORATORY

## 2022-04-01 PROCEDURE — 1159F MED LIST DOCD IN RCRD: CPT | Mod: ,,, | Performed by: FAMILY MEDICINE

## 2022-04-01 PROCEDURE — 82746 ASSAY OF FOLIC ACID SERUM: CPT | Mod: ,,, | Performed by: CLINICAL MEDICAL LABORATORY

## 2022-04-01 PROCEDURE — 85025 CBC WITH DIFFERENTIAL: ICD-10-PCS | Mod: ,,, | Performed by: CLINICAL MEDICAL LABORATORY

## 2022-04-01 PROCEDURE — 84443 ASSAY THYROID STIM HORMONE: CPT | Mod: ,,, | Performed by: CLINICAL MEDICAL LABORATORY

## 2022-04-01 PROCEDURE — 83735 MAGNESIUM: ICD-10-PCS | Mod: ,,, | Performed by: CLINICAL MEDICAL LABORATORY

## 2022-04-01 PROCEDURE — 1160F PR REVIEW ALL MEDS BY PRESCRIBER/CLIN PHARMACIST DOCUMENTED: ICD-10-PCS | Mod: ,,, | Performed by: FAMILY MEDICINE

## 2022-04-01 PROCEDURE — 87086 URINE CULTURE/COLONY COUNT: CPT | Mod: ,,, | Performed by: CLINICAL MEDICAL LABORATORY

## 2022-04-01 PROCEDURE — 87086 CULTURE, URINE: ICD-10-PCS | Mod: ,,, | Performed by: CLINICAL MEDICAL LABORATORY

## 2022-04-01 PROCEDURE — 3080F PR MOST RECENT DIASTOLIC BLOOD PRESSURE >= 90 MM HG: ICD-10-PCS | Mod: ,,, | Performed by: FAMILY MEDICINE

## 2022-04-01 PROCEDURE — 82746 VITAMIN B12/FOLATE, SERUM PANEL: ICD-10-PCS | Mod: ,,, | Performed by: CLINICAL MEDICAL LABORATORY

## 2022-04-01 PROCEDURE — 80061 LIPID PANEL: CPT | Mod: ,,, | Performed by: CLINICAL MEDICAL LABORATORY

## 2022-04-01 PROCEDURE — 84439 ASSAY OF FREE THYROXINE: CPT | Mod: ,,, | Performed by: CLINICAL MEDICAL LABORATORY

## 2022-04-01 PROCEDURE — 84443 TSH: ICD-10-PCS | Mod: ,,, | Performed by: CLINICAL MEDICAL LABORATORY

## 2022-04-01 PROCEDURE — 3044F HG A1C LEVEL LT 7.0%: CPT | Mod: ,,, | Performed by: FAMILY MEDICINE

## 2022-04-01 PROCEDURE — 99203 PR OFFICE/OUTPT VISIT, NEW, LEVL III, 30-44 MIN: ICD-10-PCS | Mod: ,,, | Performed by: FAMILY MEDICINE

## 2022-04-01 PROCEDURE — 82607 VITAMIN B-12: CPT | Mod: ,,, | Performed by: CLINICAL MEDICAL LABORATORY

## 2022-04-01 PROCEDURE — 80053 COMPREHENSIVE METABOLIC PANEL: ICD-10-PCS | Mod: ,,, | Performed by: CLINICAL MEDICAL LABORATORY

## 2022-04-01 PROCEDURE — 3077F PR MOST RECENT SYSTOLIC BLOOD PRESSURE >= 140 MM HG: ICD-10-PCS | Mod: ,,, | Performed by: FAMILY MEDICINE

## 2022-04-01 PROCEDURE — 82607 VITAMIN B12/FOLATE, SERUM PANEL: ICD-10-PCS | Mod: ,,, | Performed by: CLINICAL MEDICAL LABORATORY

## 2022-04-01 PROCEDURE — 85025 COMPLETE CBC W/AUTO DIFF WBC: CPT | Mod: ,,, | Performed by: CLINICAL MEDICAL LABORATORY

## 2022-04-01 PROCEDURE — 1160F RVW MEDS BY RX/DR IN RCRD: CPT | Mod: ,,, | Performed by: FAMILY MEDICINE

## 2022-04-01 PROCEDURE — 83036 HEMOGLOBIN A1C: ICD-10-PCS | Mod: ,,, | Performed by: CLINICAL MEDICAL LABORATORY

## 2022-04-01 PROCEDURE — 81003 URINALYSIS AUTO W/O SCOPE: CPT | Mod: QW,,, | Performed by: CLINICAL MEDICAL LABORATORY

## 2022-04-01 PROCEDURE — 84439 T4, FREE: ICD-10-PCS | Mod: ,,, | Performed by: CLINICAL MEDICAL LABORATORY

## 2022-04-01 PROCEDURE — 3044F PR MOST RECENT HEMOGLOBIN A1C LEVEL <7.0%: ICD-10-PCS | Mod: ,,, | Performed by: FAMILY MEDICINE

## 2022-04-01 PROCEDURE — 82306 VITAMIN D 25 HYDROXY: CPT | Mod: ,,, | Performed by: CLINICAL MEDICAL LABORATORY

## 2022-04-01 PROCEDURE — 1159F PR MEDICATION LIST DOCUMENTED IN MEDICAL RECORD: ICD-10-PCS | Mod: ,,, | Performed by: FAMILY MEDICINE

## 2022-04-01 PROCEDURE — 81003 URINALYSIS: ICD-10-PCS | Mod: QW,,, | Performed by: CLINICAL MEDICAL LABORATORY

## 2022-04-01 PROCEDURE — 83036 HEMOGLOBIN GLYCOSYLATED A1C: CPT | Mod: ,,, | Performed by: CLINICAL MEDICAL LABORATORY

## 2022-04-01 PROCEDURE — 3080F DIAST BP >= 90 MM HG: CPT | Mod: ,,, | Performed by: FAMILY MEDICINE

## 2022-04-01 PROCEDURE — 80053 COMPREHEN METABOLIC PANEL: CPT | Mod: ,,, | Performed by: CLINICAL MEDICAL LABORATORY

## 2022-04-01 PROCEDURE — 3077F SYST BP >= 140 MM HG: CPT | Mod: ,,, | Performed by: FAMILY MEDICINE

## 2022-04-01 PROCEDURE — 80061 LIPID PANEL: ICD-10-PCS | Mod: ,,, | Performed by: CLINICAL MEDICAL LABORATORY

## 2022-04-01 PROCEDURE — 83735 ASSAY OF MAGNESIUM: CPT | Mod: ,,, | Performed by: CLINICAL MEDICAL LABORATORY

## 2022-04-01 PROCEDURE — 99203 OFFICE O/P NEW LOW 30 MIN: CPT | Mod: ,,, | Performed by: FAMILY MEDICINE

## 2022-04-01 PROCEDURE — 3008F PR BODY MASS INDEX (BMI) DOCUMENTED: ICD-10-PCS | Mod: ,,, | Performed by: FAMILY MEDICINE

## 2022-04-01 PROCEDURE — 3008F BODY MASS INDEX DOCD: CPT | Mod: ,,, | Performed by: FAMILY MEDICINE

## 2022-04-01 NOTE — PROGRESS NOTES
Subjective:       Patient ID: Sheri Ho is a 56 y.o. female.    Chief Complaint: Establish Care (Bp has been up)    New pt, getting est.  Reviewed PMH.  Has severe anxiety and panic attacks and depression ever since her mother  a couple years ago.  Is seeing [facility].  Also has HTN that is not well-controlled and thinks she may have UTI:  Has pressure over bladder and some LBP.    Review of Systems   Constitutional: Positive for fatigue. Negative for appetite change, chills, fever and unexpected weight change.   Respiratory: Negative for cough and shortness of breath.    Cardiovascular: Negative for chest pain and leg swelling.   Gastrointestinal: Positive for abdominal pain.   Musculoskeletal: Positive for back pain. Negative for arthralgias.   Integumentary:  Negative for rash.   Neurological: Negative for weakness.   Psychiatric/Behavioral: Positive for dysphoric mood. Negative for suicidal ideas. The patient is nervous/anxious.          Objective:      Physical Exam  Constitutional:       General: She is not in acute distress.     Appearance: Normal appearance.   Cardiovascular:      Rate and Rhythm: Normal rate and regular rhythm.      Heart sounds: Normal heart sounds.   Pulmonary:      Breath sounds: Normal breath sounds.   Abdominal:      General: Abdomen is flat.      Palpations: Abdomen is soft.   Skin:     General: Skin is warm and dry.   Neurological:      Mental Status: She is alert. Mental status is at baseline.   Psychiatric:         Mood and Affect: Mood normal.         Behavior: Behavior normal.         Thought Content: Thought content normal.         Judgment: Judgment normal.         Assessment:       1. Moderate episode of recurrent major depressive disorder  T4, Free    TSH    T4, Free    TSH   2. Essential hypertension, benign  Comprehensive Metabolic Panel    CBC Auto Differential    Hemoglobin A1C    Lipid Panel    Magnesium    T4, Free    TSH    Comprehensive Metabolic Panel    CBC  Auto Differential    Hemoglobin A1C    Lipid Panel    Magnesium    T4, Free    TSH   3. Encounter for long-term (current) use of other medications  Comprehensive Metabolic Panel    CBC Auto Differential    Hemoglobin A1C    Lipid Panel    Magnesium    Vitamin B12 & Folate    Comprehensive Metabolic Panel    CBC Auto Differential    Hemoglobin A1C    Lipid Panel    Magnesium    Vitamin B12 & Folate   4. Dysuria  CBC Auto Differential    Urinalysis    Urine culture    CBC Auto Differential    Urinalysis    Urine culture   5. Chronic fatigue  Comprehensive Metabolic Panel    CBC Auto Differential    Magnesium    T4, Free    TSH    Vitamin B12 & Folate    Comprehensive Metabolic Panel    CBC Auto Differential    Magnesium    T4, Free    TSH    Vitamin B12 & Folate   6. Panic attacks     7. Screening for lipoid disorders  Lipid Panel    Lipid Panel   8. Screening for diabetes mellitus  Hemoglobin A1C    Hemoglobin A1C   9. Vitamin D deficiency  Vitamin D    Vitamin D       Plan:       UA, UC, labs  rexulti samples to try, f/u in 1 week

## 2022-04-03 LAB — UA COMPLETE W REFLEX CULTURE PNL UR: NORMAL

## 2022-04-07 ENCOUNTER — CLINICAL SUPPORT (OUTPATIENT)
Dept: REHABILITATION | Facility: HOSPITAL | Age: 57
End: 2022-04-07
Attending: ORTHOPAEDIC SURGERY
Payer: MEDICARE

## 2022-04-07 DIAGNOSIS — M25.60 DECREASED RANGE OF MOTION: ICD-10-CM

## 2022-04-07 DIAGNOSIS — R52 PAIN: ICD-10-CM

## 2022-04-07 DIAGNOSIS — R53.1 DECREASED STRENGTH: Primary | ICD-10-CM

## 2022-04-07 PROCEDURE — 97140 MANUAL THERAPY 1/> REGIONS: CPT | Mod: CQ

## 2022-04-07 PROCEDURE — 97112 NEUROMUSCULAR REEDUCATION: CPT | Mod: CQ

## 2022-04-07 NOTE — PROGRESS NOTES
[facility]   Physical Therapy Treatment Note    Name: Sheri Ho  Clinic Number: [id number]    Therapy Diagnosis:   Encounter Diagnoses   Name Primary?    Decreased strength Yes    Decreased range of motion     Pain      Physician: Cain Treviño MD    Visit Date: 2022  Physician Orders: PT Eval and Treat   Medical Diagnosis from Referral: S/P right rotator cuff repair  Evaluation Date: 2021  Authorization Period Expiration: 12/15/22  Plan of Care Expiration: 3/31/22  Visit # / Visits authorized: 21 (unlimited)    Time In: 08  Time Out: 911  Total Billable Time:   33 minutes    SUBJECTIVE     Pt reports: Missed some secondary illness    She was compliant with home exercise program.  Response to previous treatment: no problems  Functional change: can use RUE for hair fixing some, using RUE    Pain: 1/10 with  pain medication  Location: right shoulder      OBJECTIVE     Objective Measures updated at progress report unless specified.   AROM flexion in standin degrees, EXTERNAL ROTATION at 90 abduction: 82, INTERNAL ROTATION at 90 abduction: 70  Treatment     Sheri received the treatments listed below:      Therapeutic Exercise x 12 min  UBE x 3 min   Supine cane flexion 2lb x 10 with 5 sec hold NOT THIS VISIT   Sleeper stretch internal rotation  NOT THIS VISIT   cybex row #4 x 10 each : vert, horiz, underhand      Neuromuscular Re-education x 23 min  Scapular retractions x 30 NOT THIS VISIT   scaption with 2# x 10 with 3 sec hold  SL external rotation 3lb x 30  SL Horizontal ABDUCTION 3 # x 10  Bilateral prone shoulder extension 1lb x 30 NOT THIS VISIT   Unilateral prone shoulder extension 1lb x 30 NOT THIS VISIT   Unilateral prone shoulder row 1lb x NOT THIS VISIT     Manual  Mobs and MFR to R shoulder for end range flexion and INTERNAL ROTATION x 10 min          (not today)  Pulleys x 3 min NOT THIS VISIT   Wall boxes green x 15  Cybex rows 4pl x 30  Cybex  shoulder press 1pl x 30  Cable sitting flexion/extension 3pl x 20   press #2 x 10  Shoulder flexion and abduction in front of mirror 2# x 10 each  Towel circles on wall CW, CCW x 30 each 2 x  Bilateral shoulder extension blue x 30 NOT THIS VISIT   Sleeper stretch x 10 with 10 sec hold  SL scaption stretch passive x 10      Patient Education and Home Exercises     Home Exercises Provided and Patient Education Provided     Education provided:   - HEP review    Written Home Exercises Provided: Patient instructed to cont prior HEP. Exercises were reviewed and Sheri was able to demonstrate them prior to the end of the session.  Sheri demonstrated good  understanding of the education provided. See EMR under Patient Instructions for exercises provided during therapy sessions    ASSESSMENT     Patient much improved with top range flexion mechanics    Sheri Is progressing well towards her goals.   Pt prognosis is Good.     Pt will continue to benefit from skilled outpatient physical therapy to address the deficits listed in the problem list box on initial evaluation, provide pt/family education and to maximize pt's level of independence in the home and community environment.     Pt's spiritual, cultural and educational needs considered and pt agreeable to plan of care and goals.     Anticipated barriers to physical therapy: previous repair    Goals: 1. Patient will be able to have shoulder flexion to 90, external rotation to 20 and internal rotation to right hip(Goal Met)  2. Patient will be able to sleep through the night without waking from pain in her own bed (Goal Met)  3. Patient will be able to dress/bathe self independently (Goal Met)     Long Term Goals: 8 weeks (Goals Partially Met)  1. Patient will have right shoulder flexion to 120, external rotation to 50 and internal rotation to L3  2. Patient will complete activities of daily living independently with 2/10 pain  3. Patient will have normal scapular  stability of the right with no compensation/deviations during range of motion  4. Patient will have 4/5 right manual muscle test      Plan   Continue to progress toward goals         Updated Certification Period: 2/29/22 to 3/31/22  Recommended Treatment Plan: 2 times per week for 8 weeks: Electrical Stimulation IFC/Premod, Iontophoresis (with Dex), Manual Therapy, Moist Heat/ Ice, Neuromuscular Re-ed, Patient Education, Therapeutic Exercise and Ultrasound  Other Recommendations: none     Maegan Gillette, PTA  4/7/2022

## 2022-04-08 ENCOUNTER — OFFICE VISIT (OUTPATIENT)
Dept: FAMILY MEDICINE | Facility: CLINIC | Age: 57
End: 2022-04-08
Payer: MEDICARE

## 2022-04-08 VITALS
SYSTOLIC BLOOD PRESSURE: 120 MMHG | RESPIRATION RATE: 18 BRPM | DIASTOLIC BLOOD PRESSURE: 78 MMHG | TEMPERATURE: 98 F | HEART RATE: 82 BPM | BODY MASS INDEX: 31.55 KG/M2 | HEIGHT: 67 IN | WEIGHT: 201 LBS

## 2022-04-08 DIAGNOSIS — F41.0 PANIC ATTACKS: ICD-10-CM

## 2022-04-08 DIAGNOSIS — F33.1 MODERATE EPISODE OF RECURRENT MAJOR DEPRESSIVE DISORDER: Primary | ICD-10-CM

## 2022-04-08 DIAGNOSIS — R73.03 PREDIABETES: ICD-10-CM

## 2022-04-08 DIAGNOSIS — I10 ESSENTIAL HYPERTENSION, BENIGN: ICD-10-CM

## 2022-04-08 PROCEDURE — 3044F HG A1C LEVEL LT 7.0%: CPT | Mod: ,,, | Performed by: FAMILY MEDICINE

## 2022-04-08 PROCEDURE — 3078F PR MOST RECENT DIASTOLIC BLOOD PRESSURE < 80 MM HG: ICD-10-PCS | Mod: ,,, | Performed by: FAMILY MEDICINE

## 2022-04-08 PROCEDURE — 3044F PR MOST RECENT HEMOGLOBIN A1C LEVEL <7.0%: ICD-10-PCS | Mod: ,,, | Performed by: FAMILY MEDICINE

## 2022-04-08 PROCEDURE — 3078F DIAST BP <80 MM HG: CPT | Mod: ,,, | Performed by: FAMILY MEDICINE

## 2022-04-08 PROCEDURE — 1159F MED LIST DOCD IN RCRD: CPT | Mod: ,,, | Performed by: FAMILY MEDICINE

## 2022-04-08 PROCEDURE — 3074F SYST BP LT 130 MM HG: CPT | Mod: ,,, | Performed by: FAMILY MEDICINE

## 2022-04-08 PROCEDURE — 3074F PR MOST RECENT SYSTOLIC BLOOD PRESSURE < 130 MM HG: ICD-10-PCS | Mod: ,,, | Performed by: FAMILY MEDICINE

## 2022-04-08 PROCEDURE — 99213 OFFICE O/P EST LOW 20 MIN: CPT | Mod: ,,, | Performed by: FAMILY MEDICINE

## 2022-04-08 PROCEDURE — 3008F BODY MASS INDEX DOCD: CPT | Mod: ,,, | Performed by: FAMILY MEDICINE

## 2022-04-08 PROCEDURE — 99213 PR OFFICE/OUTPT VISIT, EST, LEVL III, 20-29 MIN: ICD-10-PCS | Mod: ,,, | Performed by: FAMILY MEDICINE

## 2022-04-08 PROCEDURE — 3008F PR BODY MASS INDEX (BMI) DOCUMENTED: ICD-10-PCS | Mod: ,,, | Performed by: FAMILY MEDICINE

## 2022-04-08 PROCEDURE — 1159F PR MEDICATION LIST DOCUMENTED IN MEDICAL RECORD: ICD-10-PCS | Mod: ,,, | Performed by: FAMILY MEDICINE

## 2022-04-08 RX ORDER — PROPRANOLOL HYDROCHLORIDE 40 MG/1
1 TABLET ORAL 2 TIMES DAILY
COMMUNITY
Start: 2022-04-04 | End: 2022-04-11 | Stop reason: SDUPTHER

## 2022-04-08 NOTE — PROGRESS NOTES
Subjective:       Patient ID: Sheri Ho is a 56 y.o. female.    Chief Complaint: Follow-up (1 week)    Dr. Lanier gave her inderal for HTN and her tremors and it is working great.  Feeling better these days.  Reviewed labs.    Review of Systems   Constitutional: Negative for appetite change, chills, fatigue, fever and unexpected weight change.   Respiratory: Negative for cough and shortness of breath.    Cardiovascular: Negative for chest pain and leg swelling.   Gastrointestinal: Negative for abdominal pain.   Musculoskeletal: Negative for arthralgias.   Integumentary:  Negative for rash.   Neurological: Negative for weakness.   Psychiatric/Behavioral: The patient is not nervous/anxious.          Objective:      Physical Exam  Constitutional:       General: She is not in acute distress.     Appearance: Normal appearance.   Cardiovascular:      Rate and Rhythm: Normal rate and regular rhythm.      Heart sounds: Normal heart sounds.   Pulmonary:      Breath sounds: Normal breath sounds.   Abdominal:      General: Abdomen is flat.      Palpations: Abdomen is soft.   Skin:     General: Skin is warm and dry.   Neurological:      Mental Status: She is alert. Mental status is at baseline.   Psychiatric:         Mood and Affect: Mood normal.         Behavior: Behavior normal.         Thought Content: Thought content normal.         Judgment: Judgment normal.         Assessment:       1. Moderate episode of recurrent major depressive disorder     2. Prediabetes     3. Panic attacks     4. Essential hypertension, benign         Plan:       Cont rexulti 0.5 another week then 1mg, f/u in 1 mo  Cont inderal--refills

## 2022-04-11 RX ORDER — PROPRANOLOL HYDROCHLORIDE 40 MG/1
40 TABLET ORAL 2 TIMES DAILY
Qty: 60 TABLET | Refills: 11 | Status: SHIPPED | OUTPATIENT
Start: 2022-04-11 | End: 2022-05-18

## 2022-04-12 ENCOUNTER — CLINICAL SUPPORT (OUTPATIENT)
Dept: REHABILITATION | Facility: HOSPITAL | Age: 57
End: 2022-04-12
Attending: ORTHOPAEDIC SURGERY
Payer: MEDICARE

## 2022-04-12 DIAGNOSIS — M25.60 DECREASED RANGE OF MOTION: ICD-10-CM

## 2022-04-12 DIAGNOSIS — R53.1 DECREASED STRENGTH: Primary | ICD-10-CM

## 2022-04-12 DIAGNOSIS — R52 PAIN: ICD-10-CM

## 2022-04-12 PROCEDURE — 97110 THERAPEUTIC EXERCISES: CPT | Mod: CQ

## 2022-04-12 PROCEDURE — 97112 NEUROMUSCULAR REEDUCATION: CPT | Mod: CQ

## 2022-04-12 PROCEDURE — 97140 MANUAL THERAPY 1/> REGIONS: CPT | Mod: CQ

## 2022-04-12 NOTE — PROGRESS NOTES
[facility]   Physical Therapy Treatment Note    Name: Sheri Ho  Clinic Number: [id number]    Therapy Diagnosis:   Encounter Diagnoses   Name Primary?    Decreased strength Yes    Decreased range of motion     Pain      Physician: Cain Treviño MD    Visit Date: 2022  Physician Orders: PT Eval and Treat   Medical Diagnosis from Referral: S/P right rotator cuff repair  Evaluation Date: 2021  Authorization Period Expiration: 12/15/22  Plan of Care Expiration: 3/31/22  Visit # / Visits authorized: 22 (unlimited)    Time In: 08  Time Out: 920  Total Billable Time:   40 minutes    SUBJECTIVE     Pt reports:Painful few days secondary to weather    She was compliant with home exercise program.  Response to previous treatment: no problems  Functional change: can use RUE for hair fixing some, using RUE    Pain: 1/10 with  pain medication  Location: right shoulder      OBJECTIVE     Objective Measures updated at progress report unless specified.   AROM flexion in standin degrees, EXTERNAL ROTATION at 90 abduction: 82, INTERNAL ROTATION at 90 abduction: 70  Treatment     Sheri received the treatments listed below:      Therapeutic Exercise x 12 min  UBE x 3 min   Lat pulls #2 forward  and reverse  x 10 each  Sleeper stretch internal rotation  NOT THIS VISIT   cybex row #4 x 10 each : vert, horiz, underhand  Neuromuscular Re-education x 23 min  Scapular retractions x x 10 with 10 sec hold  scaption x 10 with 3 sec hold  SL external rotation 3lb x 30  SL Horizontal ABDUCTION 3 # x 10  Bilateral prone shoulder extension 1lb x 30 NOT THIS VISIT   Unilateral prone shoulder extension 1lb x 30 NOT THIS VISIT   Unilateral prone shoulder row 1lb x NOT THIS VISIT   Biceps curls standing cable #3 x 20  Cybex shoulder press 2pl x 20      Manual  Mobs and MFR to R shoulder for end range flexion and INTERNAL ROTATION x 10 min                Patient Education and Home  Exercises     Home Exercises Provided and Patient Education Provided     Education provided:   - HEP review    Written Home Exercises Provided: Patient instructed to cont prior HEP. Exercises were reviewed and Sheri was able to demonstrate them prior to the end of the session.  Sheri demonstrated good  understanding of the education provided. See EMR under Patient Instructions for exercises provided during therapy sessions    ASSESSMENT     Patient much improved with top range flexion mechanics. Improving in strength tolerance    Sheri Is progressing well towards her goals.   Pt prognosis is Good.     Pt will continue to benefit from skilled outpatient physical therapy to address the deficits listed in the problem list box on initial evaluation, provide pt/family education and to maximize pt's level of independence in the home and community environment.     Pt's spiritual, cultural and educational needs considered and pt agreeable to plan of care and goals.     Anticipated barriers to physical therapy: previous repair    Goals: 1. Patient will be able to have shoulder flexion to 90, external rotation to 20 and internal rotation to right hip(Goal Met)  2. Patient will be able to sleep through the night without waking from pain in her own bed (Goal Met)  3. Patient will be able to dress/bathe self independently (Goal Met)     Long Term Goals: 8 weeks (Goals Partially Met)  1. Patient will have right shoulder flexion to 120, external rotation to 50 and internal rotation to L3  2. Patient will complete activities of daily living independently with 2/10 pain  3. Patient will have normal scapular stability of the right with no compensation/deviations during range of motion  4. Patient will have 4/5 right manual muscle test      Plan   Continue to progress toward goals         Updated Certification Period: 2/29/22 to 3/31/22  Recommended Treatment Plan: 2 times per week for 8 weeks: Electrical Stimulation IFC/Zane,  Iontophoresis (with Dex), Manual Therapy, Moist Heat/ Ice, Neuromuscular Re-ed, Patient Education, Therapeutic Exercise and Ultrasound  Other Recommendations: none     Maegan Gillette, PTA  4/12/2022

## 2022-04-14 ENCOUNTER — CLINICAL SUPPORT (OUTPATIENT)
Dept: REHABILITATION | Facility: HOSPITAL | Age: 57
End: 2022-04-14
Attending: ORTHOPAEDIC SURGERY
Payer: MEDICARE

## 2022-04-14 DIAGNOSIS — R52 PAIN: ICD-10-CM

## 2022-04-14 DIAGNOSIS — M25.60 DECREASED RANGE OF MOTION: ICD-10-CM

## 2022-04-14 DIAGNOSIS — R53.1 DECREASED STRENGTH: Primary | ICD-10-CM

## 2022-04-14 PROCEDURE — 97110 THERAPEUTIC EXERCISES: CPT

## 2022-04-14 PROCEDURE — 97140 MANUAL THERAPY 1/> REGIONS: CPT

## 2022-04-14 PROCEDURE — 97112 NEUROMUSCULAR REEDUCATION: CPT

## 2022-04-14 NOTE — PROGRESS NOTES
[facility]   Physical Therapy Treatment Note    Name: Sheri Ho  Clinic Number: [id number]    Therapy Diagnosis:   Encounter Diagnoses   Name Primary?    Decreased strength Yes    Decreased range of motion     Pain      Physician: Cain Treviño MD    Visit Date: 2022  Physician Orders: PT Eval and Treat   Medical Diagnosis from Referral: S/P right rotator cuff repair  Evaluation Date: 2021  Authorization Period Expiration: 12/15/22  Plan of Care Expiration: 3/31/22  Visit # / Visits authorized: 23 (unlimited)    Time In: 0838 am  Time Out: 916 am  Total Billable Time:   38 minutes    SUBJECTIVE     Pt reports: 8 minutes late for appointment - says she had to take her grandchild to school    She was compliant with home exercise program.  Response to previous treatment: no problems  Functional change: can use RUE for hair fixing some, using RUE    Pain: 10 with  pain medication  Location: right shoulder      OBJECTIVE     Objective Measures updated at progress report unless specified.   AROM flexion in standin degrees at treatment start, EXTERNAL ROTATION at 90 abduction: 82, INTERNAL ROTATION at 90 abduction: 70  External rotation in adduction 50  Treatment     Sheri received the treatments listed below:      Therapeutic Exercise x 8 min  UBE x 3 min   Lat pulls #2 forward  and reverse  x 10 each  Sleeper stretch internal rotation  NOT THIS VISIT   cybex row #4 x 10 each : vert, horiz, underhand    Neuromuscular Re-education x 16 min  Scapular retractions blue x 10 with 10 sec hold  scaption x 10 with 3 sec hold  SL external rotation 3lb x 30  SL Horizontal ABDUCTION 3 # 3 x 10  Bilateral prone shoulder extension 1lb x 30 NOT THIS VISIT   Unilateral prone shoulder extension 1lb x 30 NOT THIS VISIT   Unilateral prone shoulder row 1lb x NOT THIS VISIT   Biceps curls standing cable #3 x 20  Cybex shoulder press 2pl x 20      Manual  Mobs and MFR to R  shoulder for end range flexion and INTERNAL ROTATION in supine; cross body posterior capsule stretch in supine, posterior capsule mob in left sidelying with shoulder in slight internal rotation x 14 min                Patient Education and Home Exercises     Home Exercises Provided and Patient Education Provided     Education provided:   - HEP review    Written Home Exercises Provided: Patient instructed to cont prior HEP. Exercises were reviewed and Sheri was able to demonstrate them prior to the end of the session.  Sheri demonstrated good  understanding of the education provided. See EMR under Patient Instructions for exercises provided during therapy sessions    ASSESSMENT     Patient tolerated treatment today without complaints. She says her shoulder continues to improve. She would benefit from continued physical therapy to restore normal glenohumeral joint range of motion and improved rotator cuff strength and scapular stability for normal scapulohumeral rhythm.     Sheri Is progressing well towards her goals.   Pt prognosis is Good.     Pt will continue to benefit from skilled outpatient physical therapy to address the deficits listed in the problem list box on initial evaluation, provide pt/family education and to maximize pt's level of independence in the home and community environment.     Pt's spiritual, cultural and educational needs considered and pt agreeable to plan of care and goals.     Anticipated barriers to physical therapy: previous repair    Goals: 1. Patient will be able to have shoulder flexion to 90, external rotation to 20 and internal rotation to right hip(Goal Met)  2. Patient will be able to sleep through the night without waking from pain in her own bed (Goal Met)  3. Patient will be able to dress/bathe self independently (Goal Met)     Long Term Goals: 8 weeks (Goals Partially Met)  1. Patient will have right shoulder flexion to 120, external rotation to 50 and internal rotation to L3  2.  Patient will complete activities of daily living independently with 2/10 pain  3. Patient will have normal scapular stability of the right with no compensation/deviations during range of motion  4. Patient will have 4/5 right manual muscle test      Plan   Continue to progress toward goals         Updated Certification Period: 2/29/22 to 3/31/22  Recommended Treatment Plan: 2 times per week for 8 weeks: Electrical Stimulation IFC/Premod, Iontophoresis (with Dex), Manual Therapy, Moist Heat/ Ice, Neuromuscular Re-ed, Patient Education, Therapeutic Exercise and Ultrasound  Other Recommendations: none     AVEL LION, PT  4/14/2022

## 2022-04-19 ENCOUNTER — CLINICAL SUPPORT (OUTPATIENT)
Dept: REHABILITATION | Facility: HOSPITAL | Age: 57
End: 2022-04-19
Attending: ORTHOPAEDIC SURGERY
Payer: MEDICARE

## 2022-04-19 DIAGNOSIS — R52 PAIN: ICD-10-CM

## 2022-04-19 DIAGNOSIS — M25.60 DECREASED RANGE OF MOTION: ICD-10-CM

## 2022-04-19 DIAGNOSIS — R53.1 DECREASED STRENGTH: Primary | ICD-10-CM

## 2022-04-19 PROCEDURE — 97110 THERAPEUTIC EXERCISES: CPT | Mod: CQ

## 2022-04-19 PROCEDURE — 97112 NEUROMUSCULAR REEDUCATION: CPT | Mod: CQ

## 2022-04-19 NOTE — PROGRESS NOTES
[facility]   Physical Therapy Treatment Note    Name: Sheri Ho  Clinic Number: [id number]    Therapy Diagnosis:   No diagnosis found.  Physician: Cain Treviño MD    Visit Date: 2022  Physician Orders: PT Eval and Treat   Medical Diagnosis from Referral: S/P right rotator cuff repair  Evaluation Date: 2021  Authorization Period Expiration: 12/15/22  Plan of Care Expiration: 3/31/22  Visit # / Visits authorized: 24 (unlimited)    Time In: 838  Time Out: 912  Total Billable Time: 34   minutes    SUBJECTIVE     Pt reports:Getting better, can really tell    She was compliant with home exercise program.  Response to previous treatment: no problems  Functional change: can use RUE for hair fixing some, using RUE    Pain: 1/10 with  pain medication  Location: right shoulder      OBJECTIVE     Objective Measures updated at progress report unless specified.   AROM flexion in standin degrees, EXTERNAL ROTATION at 90 abduction: 82, INTERNAL ROTATION at 90 abduction: 70  Treatment     Sheri received the treatments listed below:      Therapeutic Exercise x 12 min  UBE x 3 min   Lat pulls #2 forward  and reverse  x 10 each  Sleeper stretch internal rotation  NOT THIS VISIT   cybex row #4 x 10 each : vert, horiz, underhand  Neuromuscular Re-education x 23 min  Scapular retractions with extension isometrics x 10 with 10 sec hold  scaption x 10 with 3 sec hold  SL external rotation 3lb x 30  SL Horizontal ABDUCTION 3 # x 10  Bilateral prone shoulder extension 1lb x 30 NOT THIS VISIT   Unilateral prone shoulder extension 1lb x 30 NOT THIS VISIT   Unilateral prone shoulder row 1lb x NOT THIS VISIT   Biceps curls standing cable #3 x 20  Cybex shoulder press 2pl x 20      Manual  Mobs and MFR to R shoulder for end range flexion and INTERNAL ROTATION x 6 min                Patient Education and Home Exercises     Home Exercises Provided and Patient Education Provided      Education provided:   - HEP review    Written Home Exercises Provided: Patient instructed to cont prior HEP. Exercises were reviewed and Sheri was able to demonstrate them prior to the end of the session.  Sheri demonstrated good  understanding of the education provided. See EMR under Patient Instructions for exercises provided during therapy sessions    ASSESSMENT     Patient much improved with top range flexion mechanics. Improving in strength tolerance    Sheri Is progressing well towards her goals.   Pt prognosis is Good.     Pt will continue to benefit from skilled outpatient physical therapy to address the deficits listed in the problem list box on initial evaluation, provide pt/family education and to maximize pt's level of independence in the home and community environment.     Pt's spiritual, cultural and educational needs considered and pt agreeable to plan of care and goals.     Anticipated barriers to physical therapy: previous repair    Goals: 1. Patient will be able to have shoulder flexion to 90, external rotation to 20 and internal rotation to right hip(Goal Met)  2. Patient will be able to sleep through the night without waking from pain in her own bed (Goal Met)  3. Patient will be able to dress/bathe self independently (Goal Met)     Long Term Goals: 8 weeks (Goals Partially Met)  1. Patient will have right shoulder flexion to 120, external rotation to 50 and internal rotation to L3  2. Patient will complete activities of daily living independently with 2/10 pain  3. Patient will have normal scapular stability of the right with no compensation/deviations during range of motion  4. Patient will have 4/5 right manual muscle test      Plan   Continue to progress toward goals         Updated Certification Period: 2/29/22 to 3/31/22  Recommended Treatment Plan: 2 times per week for 8 weeks: Electrical Stimulation IFC/Premod, Iontophoresis (with Dex), Manual Therapy, Moist Heat/ Ice, Neuromuscular  Re-ed, Patient Education, Therapeutic Exercise and Ultrasound  Other Recommendations: none     Maegan Gillette, PTA  4/19/2022

## 2022-04-26 ENCOUNTER — CLINICAL SUPPORT (OUTPATIENT)
Dept: REHABILITATION | Facility: HOSPITAL | Age: 57
End: 2022-04-26
Attending: ORTHOPAEDIC SURGERY
Payer: MEDICARE

## 2022-04-26 DIAGNOSIS — R53.1 DECREASED STRENGTH: Primary | ICD-10-CM

## 2022-04-26 DIAGNOSIS — M25.60 DECREASED RANGE OF MOTION: ICD-10-CM

## 2022-04-26 DIAGNOSIS — R52 PAIN: ICD-10-CM

## 2022-04-26 PROCEDURE — 97110 THERAPEUTIC EXERCISES: CPT | Mod: CQ

## 2022-04-26 PROCEDURE — 97140 MANUAL THERAPY 1/> REGIONS: CPT | Mod: CQ

## 2022-04-26 PROCEDURE — 97112 NEUROMUSCULAR REEDUCATION: CPT | Mod: CQ

## 2022-04-26 NOTE — PROGRESS NOTES
[facility]   Physical Therapy Treatment Note    Name: Sheri Ho  Clinic Number: [id number]    Therapy Diagnosis:   Encounter Diagnoses   Name Primary?    Decreased strength Yes    Decreased range of motion     Pain      Physician: Cain Treviño MD    Visit Date: 2022  Physician Orders: PT Eval and Treat   Medical Diagnosis from Referral: S/P right rotator cuff repair  Evaluation Date: 2021  Authorization Period Expiration: 12/15/22  Plan of Care Expiration: 3/31/22  Visit # / Visits authorized: 25 (unlimited)    Time In: 1135  Time Out: 1215  Total Billable Time: 40  minutes    SUBJECTIVE     Pt reports:some sharp pains while lying in bed that run down into arm    She was compliant with home exercise program.  Response to previous treatment: no problems  Functional change: can use RUE for hair fixing some, using RUE    Pain: 1/10 with  pain medication  Location: right shoulder      OBJECTIVE     Objective Measures updated at progress report unless specified.   AROM flexion in standin degrees, EXTERNAL ROTATION at 90 abduction: 82, INTERNAL ROTATION at 90 abduction: 70  Treatment     Sheri received the treatments listed below:      Therapeutic Exercise x 12 min  UBE x 3 min   Lat pulls #2 forward  and reverse  x 10 each  Sleeper stretch internal rotation  NOT THIS VISIT   cybex row #4 x 10 each : vert, horiz, underhand  Neuromuscular Re-education x 23   scaption stretch  x 10 with 10 sec hold  SL external rotation 3lb x 30  SL Horizontal ABDUCTION 3 # x 10 NOT THIS VISIT   Single arm flexion slide x 10 with 10 sec hold  Wall kenyatta with off wall lowering red x 20   Biceps curls standing cable #3 x 20  Cybex shoulder press 2pl x 20      Manual  Mobs and MFR to R shoulder for end range flexion and INTERNAL ROTATION x 6 min                Patient Education and Home Exercises     Home Exercises Provided and Patient Education Provided     Education  provided:   - HEP review    Written Home Exercises Provided: Patient instructed to cont prior HEP. Exercises were reviewed and Sheri was able to demonstrate them prior to the end of the session.  Sheri demonstrated good  understanding of the education provided. See EMR under Patient Instructions for exercises provided during therapy sessions    ASSESSMENT     Patient much improved with top range flexion mechanics. Improving in strength tolerance    Sheri Is progressing well towards her goals.   Pt prognosis is Good.     Pt will continue to benefit from skilled outpatient physical therapy to address the deficits listed in the problem list box on initial evaluation, provide pt/family education and to maximize pt's level of independence in the home and community environment.     Pt's spiritual, cultural and educational needs considered and pt agreeable to plan of care and goals.     Anticipated barriers to physical therapy: previous repair    Goals: 1. Patient will be able to have shoulder flexion to 90, external rotation to 20 and internal rotation to right hip(Goal Met)  2. Patient will be able to sleep through the night without waking from pain in her own bed (Goal Met)  3. Patient will be able to dress/bathe self independently (Goal Met)     Long Term Goals: 8 weeks (Goals Partially Met)  1. Patient will have right shoulder flexion to 120, external rotation to 50 and internal rotation to L3  2. Patient will complete activities of daily living independently with 2/10 pain  3. Patient will have normal scapular stability of the right with no compensation/deviations during range of motion  4. Patient will have 4/5 right manual muscle test      Plan   Continue to progress toward goals         Updated Certification Period: 2/29/22 to 3/31/22  Recommended Treatment Plan: 2 times per week for 8 weeks: Electrical Stimulation IFC/Premod, Iontophoresis (with Dex), Manual Therapy, Moist Heat/ Ice, Neuromuscular Re-ed, Patient  Education, Therapeutic Exercise and Ultrasound  Other Recommendations: none     Maegan Gillette, PTA  4/26/2022

## 2022-04-28 ENCOUNTER — CLINICAL SUPPORT (OUTPATIENT)
Dept: REHABILITATION | Facility: HOSPITAL | Age: 57
End: 2022-04-28
Attending: ORTHOPAEDIC SURGERY
Payer: MEDICARE

## 2022-04-28 DIAGNOSIS — R52 PAIN: ICD-10-CM

## 2022-04-28 DIAGNOSIS — R53.1 DECREASED STRENGTH: Primary | ICD-10-CM

## 2022-04-28 DIAGNOSIS — M25.60 DECREASED RANGE OF MOTION: ICD-10-CM

## 2022-04-28 PROCEDURE — 97110 THERAPEUTIC EXERCISES: CPT | Mod: CQ

## 2022-04-28 PROCEDURE — 97112 NEUROMUSCULAR REEDUCATION: CPT | Mod: CQ

## 2022-04-28 PROCEDURE — 97140 MANUAL THERAPY 1/> REGIONS: CPT | Mod: CQ

## 2022-04-28 NOTE — PROGRESS NOTES
[facility]   Physical Therapy Treatment Note    Name: Sheri Ho  Clinic Number: [id number]    Therapy Diagnosis:   Encounter Diagnoses   Name Primary?    Decreased strength Yes    Decreased range of motion     Pain      Physician: Cain Treviño MD    Visit Date: 2022  Physician Orders: PT Eval and Treat   Medical Diagnosis from Referral: S/P right rotator cuff repair  Evaluation Date: 2021  Authorization Period Expiration: 12/15/22  Plan of Care Expiration: 3/31/22  Visit # / Visits authorized: 26 (unlimited)    Time In: 830  Time Out: 915  Total Billable Time: 45  minutes    SUBJECTIVE     Pt reports:still having some pains in shoulder    She was compliant with home exercise program.  Response to previous treatment: no problems  Functional change: can use RUE for hair fixing some, using RUE    Pain: 1/10 with  pain medication  Location: right shoulder      OBJECTIVE     Objective Measures updated at progress report unless specified.   AROM flexion in standin degrees, EXTERNAL ROTATION at 90 abduction: 82, INTERNAL ROTATION at 90 abduction: 70  Treatment     Sheri received the treatments listed below:      Therapeutic Exercise x  min  UBE x 4 min   Wall boxes red x 15   Lat pulls #2 forward  and reverse  x 10 each  Sleeper stretch internal rotation  NOT THIS VISIT   cybex row #4 x 10 each : vert, horiz, underhand  Neuromuscular Re-education x 23   scaption stretch  x 10 with 10 sec hold  SL external rotation 3lb x 30  SL Horizontal ABDUCTION 3 # x 10    Single arm flexion slide x 10 with 10 sec hold  Wall kenyatta with off wall lowering red x 20   Biceps curls standing cable #3 x 20  Cybex shoulder press 2pl x 20  Green band horizontal ABDUCTION x 20      Manual  Mobs and MFR to R shoulder for end range flexion and INTERNAL ROTATION x 8 min                Patient Education and Home Exercises     Home Exercises Provided and Patient Education Provided      Education provided:   - HEP review    Written Home Exercises Provided: Patient instructed to cont prior HEP. Exercises were reviewed and Sheri was able to demonstrate them prior to the end of the session.  Sheri demonstrated good  understanding of the education provided. See EMR under Patient Instructions for exercises provided during therapy sessions    ASSESSMENT     Patient much improved with top range flexion mechanics. Improving in strength tolerance    Sheri Is progressing well towards her goals.   Pt prognosis is Good.     Pt will continue to benefit from skilled outpatient physical therapy to address the deficits listed in the problem list box on initial evaluation, provide pt/family education and to maximize pt's level of independence in the home and community environment.     Pt's spiritual, cultural and educational needs considered and pt agreeable to plan of care and goals.     Anticipated barriers to physical therapy: previous repair    Goals: 1. Patient will be able to have shoulder flexion to 90, external rotation to 20 and internal rotation to right hip(Goal Met)  2. Patient will be able to sleep through the night without waking from pain in her own bed (Goal Met)  3. Patient will be able to dress/bathe self independently (Goal Met)     Long Term Goals: 8 weeks (Goals Partially Met)  1. Patient will have right shoulder flexion to 120, external rotation to 50 and internal rotation to L3  2. Patient will complete activities of daily living independently with 2/10 pain  3. Patient will have normal scapular stability of the right with no compensation/deviations during range of motion  4. Patient will have 4/5 right manual muscle test      Plan   Continue to progress toward goals         Updated Certification Period: 2/29/22 to 3/31/22  Recommended Treatment Plan: 2 times per week for 8 weeks: Electrical Stimulation IFC/Premod, Iontophoresis (with Dex), Manual Therapy, Moist Heat/ Ice, Neuromuscular  Re-ed, Patient Education, Therapeutic Exercise and Ultrasound  Other Recommendations: none     Maegan Gillette, PTA  4/28/2022

## 2022-05-02 ENCOUNTER — DOCUMENTATION ONLY (OUTPATIENT)
Dept: REHABILITATION | Facility: HOSPITAL | Age: 57
End: 2022-05-02
Payer: MEDICARE

## 2022-05-02 ENCOUNTER — CLINICAL SUPPORT (OUTPATIENT)
Dept: REHABILITATION | Facility: HOSPITAL | Age: 57
End: 2022-05-02
Attending: ORTHOPAEDIC SURGERY
Payer: MEDICARE

## 2022-05-02 DIAGNOSIS — M25.60 DECREASED RANGE OF MOTION: ICD-10-CM

## 2022-05-02 DIAGNOSIS — R52 PAIN: ICD-10-CM

## 2022-05-02 DIAGNOSIS — R53.1 DECREASED STRENGTH: Primary | ICD-10-CM

## 2022-05-02 PROCEDURE — 97110 THERAPEUTIC EXERCISES: CPT

## 2022-05-02 NOTE — PROGRESS NOTES
[facility]   Physical Therapy Updated Plan of Care      Name: Sheri Ho  Clinic Number: [id number]     Therapy Diagnosis:        Encounter Diagnoses   Name Primary?    Decreased strength Yes    Decreased range of motion      Pain        Physician: Cain Treviño MD     Visit Date: 4/28/2022  Physician Orders: PT Eval and Treat   Medical Diagnosis from Referral: S/P right rotator cuff repair  Evaluation Date: 12/22/2021  Authorization Period Expiration: 12/15/22  Plan of Care Expiration: 5/30/22  Visit # / Visits authorized: 26 (unlimited)      Goals:  1. Patient will be able to have shoulder flexion to 90, external rotation to 20 and internal rotation to right hip(Goal Met)  2. Patient will be able to sleep through the night without waking from pain in her own bed (Goal Met)  3. Patient will be able to dress/bathe self independently (Goal Met)     Long Term Goals: 8 weeks  1. Patient will have right shoulder flexion to 120, external rotation to 50 and internal rotation to L3 (Goal Met)  2. Patient will complete activities of daily living independently with 2/10 pain (patient pain fluctuates between 4 with medication to 7 without medication)  3. Patient will have normal scapular stability of the right with no compensation/deviations during range of motion (Goal Partially Met)  4. Patient will have 4/5 right manual muscle test (goal partially met)       Reasons for Recertification of Therapy: to continue to relax upper trapezius muscle and focus on scapular stability    Plan     Updated Certification Period: 4/29/22 to 5/30/22    Recommended Treatment Plan: 2 times per week for 8 weeks: Cervical/Lumbar Traction, Electrical Stimulation IFC/Premod, Iontophoresis (with Dex), Manual Therapy, Moist Heat/ Ice, Neuromuscular Re-ed, Patient Education, Therapeutic Exercise, Ultrasound and Dry Needling  Other Recommendations: To continue to attempt to relax the upper trapezius muscle and  focus on goals    JONATHAN BREWSTER, PT  5/2/2022      I CERTIFY THE NEED FOR THESE SERVICES FURNISHED UNDER THIS PLAN OF TREATMENT AND WHILE UNDER MY CARE.    Physician's comments:      Physician's Signature: ___________________________________________________

## 2022-05-02 NOTE — PROGRESS NOTES
[facility]   Physical Therapy Discharge Summary    Name: Sheri Ho  Clinic Number: [id number]    Therapy Diagnosis:   Encounter Diagnoses   Name Primary?    Decreased strength Yes    Decreased range of motion     Pain      Physician: Cain Treviño MD    Visit Date: 2022  Physician Orders: PT Eval and Treat   Medical Diagnosis from Referral: S/P right rotator cuff repair  Evaluation Date: 2021  Authorization Period Expiration: 12/15/22  Plan of Care Expiration: 22  Visit # / Visits authorized: 27 (unlimited)    Time In: 845am  Time Out: 915  Total Billable Time: 30  minutes    SUBJECTIVE     Pt reports: Feels pain along the right anterior shoulder and across the back and through the trapezius muscle. Achy    She was compliant with home exercise program.  Response to previous treatment: no problems  Functional change: can use RUE for hair fixing some, using RUE    Pain: 4-5/10 with  pain medication and 7/10 without medication  Location: right shoulder      OBJECTIVE     AROM flexion in standin degrees, EXTERNAL ROTATION at 90 abduction: 82, INTERNAL ROTATION at 90 abduction: 70    Treatment     Sheri received the treatments listed below:      Therapeutic Exercise x  min  UBE x 5 min   cybex row #4 x 20 each : vert, horiz  Cybex shoulder press 2pl x 20  Lat pulls #2 wide and close  x 30 each  Sitting cable shoulder flexion/extension 3pl x 20    Dry Needling Assessment:  Patient demonstrates appropriate response to Functional Dry Needling.  Objective:  See EMR under MEDIA for written consent provided 2022.   Palpation Assessment to determine the necessity for Functional Dry Needling: tender trigger point and muscle guarding.   Sheri received the following manual therapy techniques: palpation over the right trapezius musculature demonstrates reactive trigger points.   Plan:   Monitor response to Functional Dry Needling. Continue with Functional Dry  Needling in plan of care as appropriate       Neuromuscular Re-education x 23   scaption stretch  x 10 with 10 sec hold  SL external rotation 3lb x 30  SL Horizontal ABDUCTION 3 # x 10    Single arm flexion slide x 10 with 10 sec hold  Wall kenyatta with off wall lowering red x 20   Biceps curls standing cable #3 x 20  Green band horizontal ABDUCTION x 20    Manual  Mobs and MFR to R shoulder for end range flexion and INTERNAL ROTATION x 8 min      Patient Education and Home Exercises     Home Exercises Provided and Patient Education Provided     Education provided:   - HEP review    Written Home Exercises Provided: Patient instructed to cont prior HEP. Exercises were reviewed and Sheri was able to demonstrate them prior to the end of the session.  Sheri demonstrated good  understanding of the education provided. See EMR under Patient Instructions for exercises provided during therapy sessions    ASSESSMENT     Patient states she felt better after the dry needling and states the exercises did not hurt as bad after the needling as well.  Will re-assess dry needling outcome next session. Add myofascial release if needed and cervical/trapezius stretching if needed.     Patient has plateaued with therapy and has reached maximum benefit at this time. Range of motion is good and strength and scapular stability is within functional limits but the patient's subjective pain level does not change. Patient to return to MD for further advice.     Sheri Is progressing well towards her goals.   Pt prognosis is Good.     Pt will continue to benefit from skilled outpatient physical therapy to address the deficits listed in the problem list box on initial evaluation, provide pt/family education and to maximize pt's level of independence in the home and community environment.     Pt's spiritual, cultural and educational needs considered and pt agreeable to plan of care and goals.     Anticipated barriers to physical therapy: previous  repair    Goals: 1. Patient will be able to have shoulder flexion to 90, external rotation to 20 and internal rotation to right hip(Goal Met)  2. Patient will be able to sleep through the night without waking from pain in her own bed (Goal Met)  3. Patient will be able to dress/bathe self independently (Goal Met)     Long Term Goals: 8 weeks   1. Patient will have right shoulder flexion to 120, external rotation to 50 and internal rotation to L3 (Goal Met)  2. Patient will complete activities of daily living independently with 2/10 pain (Goal Not Met)  3. Patient will have normal scapular stability of the right with no compensation/deviations during range of motion (Goal Met)  4. Patient will have 4/5 right manual muscle test (Goal Met)    Discharge reason: Patient has reached the maximum rehab potential for the present time    Plan   This patient is discharged from Physical Therapy.    Date of Last visit: 5/2/22  Total Visits Received: 27      JONATHAN BREWSTER, PT

## 2022-05-02 NOTE — PROGRESS NOTES
[facility]   Physical Therapy Updated Plan of Care     Name: Sheri Ho  Clinic Number: [id number]     Therapy Diagnosis:        Encounter Diagnoses   Name Primary?    Decreased strength Yes    Decreased range of motion      Pain        Physician: Cain Treviño MD     Visit Date: 3/31/2022  Physician Orders: PT Eval and Treat   Medical Diagnosis from Referral: S/P right rotator cuff repair  Evaluation Date: 12/22/2021  Authorization Period Expiration: 12/15/22  Plan of Care Expiration: 4/29/22  Visit # / Visits authorized: 20 (unlimited)    [facility]   Physical Therapy Treatment Note     Name: Sheri Ho  Clinic Number: [id number]     Therapy Diagnosis:        Encounter Diagnoses   Name Primary?    Decreased strength Yes    Decreased range of motion      Pain        Physician: Cain Treviño MD     Visit Date: 3/31/2022  Physician Orders: PT Eval and Treat   Medical Diagnosis from Referral: S/P right rotator cuff repair  Evaluation Date: 12/22/2021  Authorization Period Expiration: 12/15/22  Plan of Care Expiration: 3/31/22  Visit # / Visits authorized: 20 (unlimited)    Patient continues to have scapular dyskinsia and will need scapular stabilization exercises to continue.       Goals: 1. Patient will be able to have shoulder flexion to 90, external rotation to 20 and internal rotation to right hip(Goal Met)  2. Patient will be able to sleep through the night without waking from pain in her own bed (Goal Met)  3. Patient will be able to dress/bathe self independently (Goal Met)     Long Term Goals: 8 weeks (Goals Partially Met)  1. Patient will have right shoulder flexion to 120, external rotation to 50 and internal rotation to L3  2. Patient will complete activities of daily living independently with 2/10 pain  3. Patient will have normal scapular stability of the right with no compensation/deviations during range of motion  4.  Patient will have 4/5 right manual muscle test    Reasons for Recertification of Therapy: to continue to progress toward gaols     Plan     Updated Certification Period: 4/1/22 to 4/29/22  Recommended Treatment Plan: 2 times per week for 8 weeks: Cervical/Lumbar Traction, Electrical Stimulation IFC/Premod, Iontophoresis (with Dex), Manual Therapy, Moist Heat/ Ice, Neuromuscular Re-ed, Patient Education, Therapeutic Exercise, Ultrasound and Dry Needling  Other Recommendations: none    JONATHAN BREWSTER, PT  5/2/2022      I CERTIFY THE NEED FOR THESE SERVICES FURNISHED UNDER THIS PLAN OF TREATMENT AND WHILE UNDER MY CARE.    Physician's comments:      Physician's Signature: ___________________________________________________

## 2022-05-06 ENCOUNTER — OFFICE VISIT (OUTPATIENT)
Dept: FAMILY MEDICINE | Facility: CLINIC | Age: 57
End: 2022-05-06
Payer: MEDICARE

## 2022-05-06 VITALS
HEART RATE: 102 BPM | SYSTOLIC BLOOD PRESSURE: 133 MMHG | BODY MASS INDEX: 32.02 KG/M2 | DIASTOLIC BLOOD PRESSURE: 72 MMHG | TEMPERATURE: 99 F | WEIGHT: 204 LBS | HEIGHT: 67 IN

## 2022-05-06 DIAGNOSIS — U07.1 COVID-19 VIRUS INFECTION: ICD-10-CM

## 2022-05-06 DIAGNOSIS — N30.00 ACUTE CYSTITIS WITHOUT HEMATURIA: ICD-10-CM

## 2022-05-06 DIAGNOSIS — R05.9 COUGH: Primary | ICD-10-CM

## 2022-05-06 DIAGNOSIS — R35.0 URINARY FREQUENCY: ICD-10-CM

## 2022-05-06 DIAGNOSIS — Z20.828 CONTACT WITH OR EXPOSURE TO VIRAL DISEASE: ICD-10-CM

## 2022-05-06 LAB
BILIRUB SERPL-MCNC: NEGATIVE MG/DL
BLOOD URINE, POC: ABNORMAL
COLOR, POC UA: YELLOW
CTP QC/QA: YES
FLUAV AG NPH QL: NEGATIVE
FLUBV AG NPH QL: NEGATIVE
GLUCOSE UR QL STRIP: NEGATIVE
KETONES UR QL STRIP: NEGATIVE
LEUKOCYTE ESTERASE URINE, POC: ABNORMAL
NITRITE, POC UA: NEGATIVE
PH, POC UA: 6.5
PROTEIN, POC: NEGATIVE
SARS-COV-2 AG RESP QL IA.RAPID: POSITIVE
SPECIFIC GRAVITY, POC UA: 1.01
UROBILINOGEN, POC UA: 1

## 2022-05-06 PROCEDURE — 99214 OFFICE O/P EST MOD 30 MIN: CPT | Mod: 25,,, | Performed by: FAMILY MEDICINE

## 2022-05-06 PROCEDURE — 1159F PR MEDICATION LIST DOCUMENTED IN MEDICAL RECORD: ICD-10-PCS | Mod: ,,, | Performed by: FAMILY MEDICINE

## 2022-05-06 PROCEDURE — 3008F PR BODY MASS INDEX (BMI) DOCUMENTED: ICD-10-PCS | Mod: ,,, | Performed by: FAMILY MEDICINE

## 2022-05-06 PROCEDURE — 3008F BODY MASS INDEX DOCD: CPT | Mod: ,,, | Performed by: FAMILY MEDICINE

## 2022-05-06 PROCEDURE — 96372 PR INJECTION,THERAP/PROPH/DIAG2ST, IM OR SUBCUT: ICD-10-PCS | Mod: ,,, | Performed by: FAMILY MEDICINE

## 2022-05-06 PROCEDURE — 87428 POCT SARS-COV2 (COVID) WITH FLU ANTIGEN: ICD-10-PCS | Mod: QW,,, | Performed by: FAMILY MEDICINE

## 2022-05-06 PROCEDURE — 96372 THER/PROPH/DIAG INJ SC/IM: CPT | Mod: ,,, | Performed by: FAMILY MEDICINE

## 2022-05-06 PROCEDURE — 81003 POCT URINALYSIS W/O SCOPE: ICD-10-PCS | Mod: QW,,, | Performed by: FAMILY MEDICINE

## 2022-05-06 PROCEDURE — 1159F MED LIST DOCD IN RCRD: CPT | Mod: ,,, | Performed by: FAMILY MEDICINE

## 2022-05-06 PROCEDURE — 81003 URINALYSIS AUTO W/O SCOPE: CPT | Mod: QW,,, | Performed by: FAMILY MEDICINE

## 2022-05-06 PROCEDURE — 3075F PR MOST RECENT SYSTOLIC BLOOD PRESS GE 130-139MM HG: ICD-10-PCS | Mod: ,,, | Performed by: FAMILY MEDICINE

## 2022-05-06 PROCEDURE — 3078F DIAST BP <80 MM HG: CPT | Mod: ,,, | Performed by: FAMILY MEDICINE

## 2022-05-06 PROCEDURE — 3044F HG A1C LEVEL LT 7.0%: CPT | Mod: ,,, | Performed by: FAMILY MEDICINE

## 2022-05-06 PROCEDURE — 3078F PR MOST RECENT DIASTOLIC BLOOD PRESSURE < 80 MM HG: ICD-10-PCS | Mod: ,,, | Performed by: FAMILY MEDICINE

## 2022-05-06 PROCEDURE — 3075F SYST BP GE 130 - 139MM HG: CPT | Mod: ,,, | Performed by: FAMILY MEDICINE

## 2022-05-06 PROCEDURE — 87428 SARSCOV & INF VIR A&B AG IA: CPT | Mod: QW,,, | Performed by: FAMILY MEDICINE

## 2022-05-06 PROCEDURE — 3044F PR MOST RECENT HEMOGLOBIN A1C LEVEL <7.0%: ICD-10-PCS | Mod: ,,, | Performed by: FAMILY MEDICINE

## 2022-05-06 PROCEDURE — 99214 PR OFFICE/OUTPT VISIT, EST, LEVL IV, 30-39 MIN: ICD-10-PCS | Mod: 25,,, | Performed by: FAMILY MEDICINE

## 2022-05-06 RX ORDER — LEVOFLOXACIN 500 MG/1
500 TABLET, FILM COATED ORAL DAILY
Qty: 7 TABLET | Refills: 0 | Status: SHIPPED | OUTPATIENT
Start: 2022-05-06 | End: 2022-05-18

## 2022-05-06 RX ORDER — DEXAMETHASONE SODIUM PHOSPHATE 4 MG/ML
6 INJECTION, SOLUTION INTRA-ARTICULAR; INTRALESIONAL; INTRAMUSCULAR; INTRAVENOUS; SOFT TISSUE
Status: COMPLETED | OUTPATIENT
Start: 2022-05-06 | End: 2022-05-06

## 2022-05-06 RX ORDER — PREDNISONE 20 MG/1
TABLET ORAL
Qty: 10 TABLET | Refills: 0 | Status: SHIPPED | OUTPATIENT
Start: 2022-05-06 | End: 2022-05-18

## 2022-05-06 RX ADMIN — DEXAMETHASONE SODIUM PHOSPHATE 6 MG: 4 INJECTION, SOLUTION INTRA-ARTICULAR; INTRALESIONAL; INTRAMUSCULAR; INTRAVENOUS; SOFT TISSUE at 02:05

## 2022-05-06 NOTE — PROGRESS NOTES
Subjective:       Patient ID: Sheri Ho is a 57 y.o. female.    Chief Complaint: Headache, Sinus Problem, Shortness of Breath, Cough, Wheezing, Urinary Frequency, and Abdominal Pain    Patient has a history of mild intermittent asthma.  No fever.  Very mild shortness of breath with cough.  No vomiting or diarrhea.    Review of Systems      Objective:      Physical Exam  Constitutional:       General: She is not in acute distress.     Appearance: She is not ill-appearing.   HENT:      Nose: Congestion and rhinorrhea present.      Mouth/Throat:      Pharynx: No posterior oropharyngeal erythema.   Cardiovascular:      Rate and Rhythm: Normal rate and regular rhythm.   Pulmonary:      Effort: Pulmonary effort is normal. No respiratory distress.      Breath sounds: Wheezing and rales (Faint scattered coarse crackles and wheezes.) present.   Abdominal:      General: Abdomen is flat. Bowel sounds are normal.      Palpations: Abdomen is soft.      Tenderness: There is no abdominal tenderness. There is no right CVA tenderness or left CVA tenderness.   Neurological:      Mental Status: She is alert.         Assessment:       Problem List Items Addressed This Visit    None     Visit Diagnoses     Cough    -  Primary    Relevant Orders    POCT SARS-COV2 (COVID) with Flu Antigen (Completed)    Urinary frequency        Relevant Orders    POCT URINALYSIS W/O SCOPE (Completed)    Contact with or exposure to viral disease        Relevant Orders    POCT SARS-COV2 (COVID) with Flu Antigen (Completed)    Acute cystitis without hematuria        COVID-19 virus infection              Plan:         patient will go to the emergency room if she has any shortness of breath not relieved by her inhalers.  Levaquin for UTI.  This should also cover any bacterial pathogens in her lungs

## 2022-05-18 ENCOUNTER — OFFICE VISIT (OUTPATIENT)
Dept: FAMILY MEDICINE | Facility: CLINIC | Age: 57
End: 2022-05-18
Payer: MEDICARE

## 2022-05-18 VITALS
HEIGHT: 67 IN | SYSTOLIC BLOOD PRESSURE: 118 MMHG | OXYGEN SATURATION: 98 % | BODY MASS INDEX: 32.02 KG/M2 | DIASTOLIC BLOOD PRESSURE: 70 MMHG | WEIGHT: 204 LBS | HEART RATE: 78 BPM | TEMPERATURE: 98 F | RESPIRATION RATE: 16 BRPM

## 2022-05-18 DIAGNOSIS — I10 HYPERTENSION, UNSPECIFIED TYPE: Primary | ICD-10-CM

## 2022-05-18 DIAGNOSIS — Z79.899 ENCOUNTER FOR LONG-TERM (CURRENT) USE OF OTHER MEDICATIONS: ICD-10-CM

## 2022-05-18 DIAGNOSIS — F33.1 MODERATE EPISODE OF RECURRENT MAJOR DEPRESSIVE DISORDER: ICD-10-CM

## 2022-05-18 DIAGNOSIS — F41.0 PANIC ATTACKS: ICD-10-CM

## 2022-05-18 PROCEDURE — 1160F RVW MEDS BY RX/DR IN RCRD: CPT | Mod: ,,, | Performed by: FAMILY MEDICINE

## 2022-05-18 PROCEDURE — 3074F SYST BP LT 130 MM HG: CPT | Mod: ,,, | Performed by: FAMILY MEDICINE

## 2022-05-18 PROCEDURE — 3044F HG A1C LEVEL LT 7.0%: CPT | Mod: ,,, | Performed by: FAMILY MEDICINE

## 2022-05-18 PROCEDURE — 1159F PR MEDICATION LIST DOCUMENTED IN MEDICAL RECORD: ICD-10-PCS | Mod: ,,, | Performed by: FAMILY MEDICINE

## 2022-05-18 PROCEDURE — 3044F PR MOST RECENT HEMOGLOBIN A1C LEVEL <7.0%: ICD-10-PCS | Mod: ,,, | Performed by: FAMILY MEDICINE

## 2022-05-18 PROCEDURE — 99213 PR OFFICE/OUTPT VISIT, EST, LEVL III, 20-29 MIN: ICD-10-PCS | Mod: ,,, | Performed by: FAMILY MEDICINE

## 2022-05-18 PROCEDURE — 3074F PR MOST RECENT SYSTOLIC BLOOD PRESSURE < 130 MM HG: ICD-10-PCS | Mod: ,,, | Performed by: FAMILY MEDICINE

## 2022-05-18 PROCEDURE — 3008F BODY MASS INDEX DOCD: CPT | Mod: ,,, | Performed by: FAMILY MEDICINE

## 2022-05-18 PROCEDURE — 3078F PR MOST RECENT DIASTOLIC BLOOD PRESSURE < 80 MM HG: ICD-10-PCS | Mod: ,,, | Performed by: FAMILY MEDICINE

## 2022-05-18 PROCEDURE — 99213 OFFICE O/P EST LOW 20 MIN: CPT | Mod: ,,, | Performed by: FAMILY MEDICINE

## 2022-05-18 PROCEDURE — 1159F MED LIST DOCD IN RCRD: CPT | Mod: ,,, | Performed by: FAMILY MEDICINE

## 2022-05-18 PROCEDURE — 3078F DIAST BP <80 MM HG: CPT | Mod: ,,, | Performed by: FAMILY MEDICINE

## 2022-05-18 PROCEDURE — 1160F PR REVIEW ALL MEDS BY PRESCRIBER/CLIN PHARMACIST DOCUMENTED: ICD-10-PCS | Mod: ,,, | Performed by: FAMILY MEDICINE

## 2022-05-18 PROCEDURE — 3008F PR BODY MASS INDEX (BMI) DOCUMENTED: ICD-10-PCS | Mod: ,,, | Performed by: FAMILY MEDICINE

## 2022-05-18 RX ORDER — HYDROCHLOROTHIAZIDE 12.5 MG/1
12.5 TABLET ORAL DAILY
Qty: 90 TABLET | Refills: 0 | Status: SHIPPED | OUTPATIENT
Start: 2022-05-18 | End: 2022-07-27

## 2022-05-18 RX ORDER — ESCITALOPRAM OXALATE 20 MG/1
20 TABLET ORAL DAILY
Qty: 90 TABLET | Refills: 3 | Status: SHIPPED | OUTPATIENT
Start: 2022-05-18 | End: 2023-03-15

## 2022-05-18 RX ORDER — IBUPROFEN 800 MG/1
800 TABLET ORAL 2 TIMES DAILY
Qty: 180 TABLET | Refills: 1 | Status: SHIPPED | OUTPATIENT
Start: 2022-05-18

## 2022-05-18 RX ORDER — BUDESONIDE AND FORMOTEROL FUMARATE DIHYDRATE 160; 4.5 UG/1; UG/1
2 AEROSOL RESPIRATORY (INHALATION) 2 TIMES DAILY
Qty: 30.6 G | Refills: 3 | Status: SHIPPED | OUTPATIENT
Start: 2022-05-18

## 2022-05-18 RX ORDER — FLUCONAZOLE 150 MG/1
TABLET ORAL
Qty: 2 TABLET | Refills: 0 | Status: SHIPPED | OUTPATIENT
Start: 2022-05-18 | End: 2022-07-29

## 2022-05-18 RX ORDER — HYDRALAZINE HYDROCHLORIDE 10 MG/1
10 TABLET, FILM COATED ORAL 2 TIMES DAILY
Qty: 180 TABLET | Refills: 3 | Status: SHIPPED | OUTPATIENT
Start: 2022-05-18 | End: 2022-11-30 | Stop reason: SDUPTHER

## 2022-05-18 RX ORDER — BREXPIPRAZOLE 1 MG/1
TABLET ORAL
Qty: 90 TABLET | Refills: 3 | Status: SHIPPED | OUTPATIENT
Start: 2022-05-18 | End: 2022-07-29 | Stop reason: SDUPTHER

## 2022-05-30 NOTE — PROGRESS NOTES
Subjective:       Patient ID: Sheri Ho is a 57 y.o. female.    Chief Complaint: Follow-up (1 month follow up.)    Followup.  Doing better now.  Needs refills.    Review of Systems   Constitutional: Negative for appetite change, chills, fatigue, fever and unexpected weight change.   Respiratory: Negative for cough and shortness of breath.    Cardiovascular: Negative for chest pain and leg swelling.   Gastrointestinal: Negative for abdominal pain.   Musculoskeletal: Negative for arthralgias.   Integumentary:  Negative for rash.   Neurological: Negative for weakness.   Psychiatric/Behavioral: The patient is not nervous/anxious.          Objective:      Physical Exam  Constitutional:       General: She is not in acute distress.     Appearance: Normal appearance.   Cardiovascular:      Rate and Rhythm: Normal rate and regular rhythm.      Heart sounds: Normal heart sounds.   Pulmonary:      Breath sounds: Normal breath sounds.   Abdominal:      General: Abdomen is flat.      Palpations: Abdomen is soft.   Skin:     General: Skin is warm and dry.   Neurological:      Mental Status: She is alert. Mental status is at baseline.   Psychiatric:         Mood and Affect: Mood normal.         Behavior: Behavior normal.         Thought Content: Thought content normal.         Judgment: Judgment normal.         Assessment:       1. Hypertension, unspecified type  hydrALAZINE (APRESOLINE) 10 MG tablet   2. Encounter for long-term (current) use of other medications     3. Panic attacks     4. Moderate episode of recurrent major depressive disorder         Plan:       Refills to pharm  F/u in 2-3 mo

## 2022-06-14 PROBLEM — R53.1 DECREASED STRENGTH: Status: RESOLVED | Noted: 2021-12-22 | Resolved: 2022-06-14

## 2022-06-14 PROBLEM — M25.60 DECREASED RANGE OF MOTION: Status: RESOLVED | Noted: 2021-12-22 | Resolved: 2022-06-14

## 2022-06-14 PROBLEM — R52 PAIN: Status: RESOLVED | Noted: 2021-12-22 | Resolved: 2022-06-14

## 2022-06-21 ENCOUNTER — OFFICE VISIT (OUTPATIENT)
Dept: GASTROENTEROLOGY | Facility: CLINIC | Age: 57
End: 2022-06-21
Payer: MEDICARE

## 2022-06-21 ENCOUNTER — HOSPITAL ENCOUNTER (OUTPATIENT)
Dept: RADIOLOGY | Facility: HOSPITAL | Age: 57
Discharge: HOME | End: 2022-06-21
Attending: NURSE PRACTITIONER
Payer: MEDICARE

## 2022-06-21 ENCOUNTER — TELEPHONE (OUTPATIENT)
Dept: GASTROENTEROLOGY | Facility: CLINIC | Age: 57
End: 2022-06-21
Payer: MEDICARE

## 2022-06-21 VITALS
SYSTOLIC BLOOD PRESSURE: 134 MMHG | HEIGHT: 67 IN | HEART RATE: 67 BPM | OXYGEN SATURATION: 92 % | WEIGHT: 206.38 LBS | DIASTOLIC BLOOD PRESSURE: 71 MMHG | BODY MASS INDEX: 32.39 KG/M2

## 2022-06-21 DIAGNOSIS — R13.10 DYSPHAGIA, UNSPECIFIED TYPE: ICD-10-CM

## 2022-06-21 DIAGNOSIS — M47.812 CERVICAL SPONDYLOSIS WITHOUT MYELOPATHY: Primary | ICD-10-CM

## 2022-06-21 DIAGNOSIS — K59.00 CONSTIPATION, UNSPECIFIED CONSTIPATION TYPE: ICD-10-CM

## 2022-06-21 DIAGNOSIS — K21.9 GERD WITHOUT ESOPHAGITIS: ICD-10-CM

## 2022-06-21 DIAGNOSIS — K74.00 LIVER FIBROSIS: Primary | ICD-10-CM

## 2022-06-21 DIAGNOSIS — K74.00 HEPATIC FIBROSIS: ICD-10-CM

## 2022-06-21 DIAGNOSIS — R10.13 EPIGASTRIC PAIN: ICD-10-CM

## 2022-06-21 PROCEDURE — 1159F PR MEDICATION LIST DOCUMENTED IN MEDICAL RECORD: ICD-10-PCS | Mod: CPTII,,, | Performed by: NURSE PRACTITIONER

## 2022-06-21 PROCEDURE — 93976 VASCULAR STUDY: CPT | Mod: TC

## 2022-06-21 PROCEDURE — 93976 US LIVER WITH DOPPLER: ICD-10-PCS | Mod: 26,,, | Performed by: STUDENT IN AN ORGANIZED HEALTH CARE EDUCATION/TRAINING PROGRAM

## 2022-06-21 PROCEDURE — 3075F SYST BP GE 130 - 139MM HG: CPT | Mod: CPTII,,, | Performed by: NURSE PRACTITIONER

## 2022-06-21 PROCEDURE — 93976 VASCULAR STUDY: CPT | Mod: 26,,, | Performed by: STUDENT IN AN ORGANIZED HEALTH CARE EDUCATION/TRAINING PROGRAM

## 2022-06-21 PROCEDURE — 76705 ECHO EXAM OF ABDOMEN: CPT | Mod: 26,59,, | Performed by: STUDENT IN AN ORGANIZED HEALTH CARE EDUCATION/TRAINING PROGRAM

## 2022-06-21 PROCEDURE — 99214 PR OFFICE/OUTPT VISIT, EST, LEVL IV, 30-39 MIN: ICD-10-PCS | Mod: ,,, | Performed by: NURSE PRACTITIONER

## 2022-06-21 PROCEDURE — 3044F HG A1C LEVEL LT 7.0%: CPT | Mod: CPTII,,, | Performed by: NURSE PRACTITIONER

## 2022-06-21 PROCEDURE — 99214 OFFICE O/P EST MOD 30 MIN: CPT | Mod: ,,, | Performed by: NURSE PRACTITIONER

## 2022-06-21 PROCEDURE — 76705 US LIVER WITH DOPPLER: ICD-10-PCS | Mod: 26,59,, | Performed by: STUDENT IN AN ORGANIZED HEALTH CARE EDUCATION/TRAINING PROGRAM

## 2022-06-21 PROCEDURE — 3075F PR MOST RECENT SYSTOLIC BLOOD PRESS GE 130-139MM HG: ICD-10-PCS | Mod: CPTII,,, | Performed by: NURSE PRACTITIONER

## 2022-06-21 PROCEDURE — 1160F PR REVIEW ALL MEDS BY PRESCRIBER/CLIN PHARMACIST DOCUMENTED: ICD-10-PCS | Mod: CPTII,,, | Performed by: NURSE PRACTITIONER

## 2022-06-21 PROCEDURE — 3044F PR MOST RECENT HEMOGLOBIN A1C LEVEL <7.0%: ICD-10-PCS | Mod: CPTII,,, | Performed by: NURSE PRACTITIONER

## 2022-06-21 PROCEDURE — 3008F BODY MASS INDEX DOCD: CPT | Mod: CPTII,,, | Performed by: NURSE PRACTITIONER

## 2022-06-21 PROCEDURE — 1159F MED LIST DOCD IN RCRD: CPT | Mod: CPTII,,, | Performed by: NURSE PRACTITIONER

## 2022-06-21 PROCEDURE — 3008F PR BODY MASS INDEX (BMI) DOCUMENTED: ICD-10-PCS | Mod: CPTII,,, | Performed by: NURSE PRACTITIONER

## 2022-06-21 PROCEDURE — 3078F DIAST BP <80 MM HG: CPT | Mod: CPTII,,, | Performed by: NURSE PRACTITIONER

## 2022-06-21 PROCEDURE — 1160F RVW MEDS BY RX/DR IN RCRD: CPT | Mod: CPTII,,, | Performed by: NURSE PRACTITIONER

## 2022-06-21 PROCEDURE — 3078F PR MOST RECENT DIASTOLIC BLOOD PRESSURE < 80 MM HG: ICD-10-PCS | Mod: CPTII,,, | Performed by: NURSE PRACTITIONER

## 2022-06-21 PROCEDURE — 76705 ECHO EXAM OF ABDOMEN: CPT | Mod: TC,59

## 2022-06-21 NOTE — PROGRESS NOTES
Sheri Ho is a 57 y.o. female here for Follow-up        PCP: Rajni Barton  Referring Provider: No referring provider defined for this encounter.     HPI:  Presents for follow up after liver ultrasound. Results are not yet released. Elastography in November Metavir score of F 2 and F 3. Does have a portosystemic shunt on previous CT scan in November. Recent labs reviewed. Liver enzymes are normal. Today she is reporting dysphagia. States that she also has epigastric pain associated with nausea. Sometimes feels that she needs to vomit to relieve the dysphagia. Last colonoscopy 7/7/16, no polyps repeat in 10 years.        ROS:  Review of Systems   Constitutional: Negative for appetite change, fatigue, fever and unexpected weight change.   HENT: Positive for trouble swallowing.    Respiratory: Negative for shortness of breath and wheezing.    Cardiovascular: Negative for chest pain and palpitations.   Gastrointestinal: Positive for abdominal pain and nausea. Negative for blood in stool, change in bowel habit, constipation, diarrhea, vomiting, reflux and change in bowel habit.   Musculoskeletal: Negative for gait problem.   Integumentary:  Negative for pallor.   Neurological: Positive for light-headedness. Negative for dizziness.   Psychiatric/Behavioral: The patient is nervous/anxious.           PMHX:  has a past medical history of Arthritis, Esophageal dysphagia (4/29/2021), GERD (gastroesophageal reflux disease), Mitral insufficiency, Panic attacks, and SOB (shortness of breath).    PSHX:  has a past surgical history that includes Cholecystectomy; Colonoscopy; Esophagogastroduodenoscopy; Hysterectomy; Shoulder arthroscopy; Cystoscopy; Rotator cuff repair; Shoulder arthroscopy (Right, 11/30/2021); Arthroscopic repair of rotator cuff of shoulder (Right, 11/30/2021); and Oophorectomy.    PFHX: family history includes Breast cancer in her cousin; Diabetes in her father; Heart attack in her father; Heart  disease in her father; Hypertension in her father, maternal grandmother, mother, and sister.    PSlHX:  reports that she has never smoked. She has never used smokeless tobacco. She reports that she does not drink alcohol and does not use drugs.        Review of patient's allergies indicates:   Allergen Reactions    Codeine Nausea And Vomiting and Swelling       Medication List with Changes/Refills   Current Medications    ALBUTEROL (PROVENTIL/VENTOLIN HFA) 90 MCG/ACTUATION INHALER    Inhale 2 puffs into the lungs every 4 (four) hours as needed for Wheezing.    ALBUTEROL SULFATE 90 MCG/ACTUATION AEBS    Inhale 180 mcg into the lungs every 6 (six) hours as needed.    AMLODIPINE (NORVASC) 5 MG TABLET    Take 0.5 tablets (2.5 mg total) by mouth once daily.    ASPIRIN (ECOTRIN) 81 MG EC TABLET    Take 81 mg by mouth once daily.    BREXPIPRAZOLE (REXULTI) 1 MG TAB    One a day for depression    BUDESONIDE-FORMOTEROL 160-4.5 MCG (SYMBICORT) 160-4.5 MCG/ACTUATION HFAA    Inhale 2 puffs into the lungs 2 (two) times a day.    BUPROPION (WELLBUTRIN SR) 100 MG TBSR 12 HR TABLET    Take 1 tablet (100 mg total) by mouth once daily.    CETIRIZINE (ZYRTEC) 10 MG TABLET    Take 1 tablet (10 mg total) by mouth once daily.    CYCLOBENZAPRINE (FLEXERIL) 10 MG TABLET    Take 10 mg by mouth 3 (three) times daily.    DICYCLOMINE (BENTYL) 20 MG TABLET        ESCITALOPRAM OXALATE (LEXAPRO) 20 MG TABLET    Take 1 tablet (20 mg total) by mouth once daily.    ESTRADIOL (ESTRACE) 0.01 % (0.1 MG/GRAM) VAGINAL CREAM        FLUCONAZOLE (DIFLUCAN) 150 MG TAB    One prn yeast infection, may repeat in 3 days if needed    HYDRALAZINE (APRESOLINE) 10 MG TABLET    Take 1 tablet (10 mg total) by mouth 2 (two) times daily.    HYDROCHLOROTHIAZIDE (HYDRODIURIL) 12.5 MG TAB    Take 1 tablet (12.5 mg total) by mouth once daily.    HYDROCODONE-ACETAMINOPHEN (NORCO)  MG PER TABLET    Take 1 tablet by mouth every 8 (eight) hours as needed.    HYDROXYZINE  "HCL (ATARAX) 10 MG TAB        IBUPROFEN (ADVIL,MOTRIN) 800 MG TABLET    Take 1 tablet (800 mg total) by mouth 2 (two) times a day.    LINACLOTIDE (LINZESS) 145 MCG CAP CAPSULE    Take 1 capsule (145 mcg total) by mouth before breakfast.    LOTEPREDNOL (LOTEMAX) 0.5 % OPHTHALMIC SUSPENSION    1 drop. As directed    MIRABEGRON ORAL    Take by mouth.    MULTIVITAMIN (MULTIPLE VITAMINS ORAL)    Take 1 tablet by mouth once daily. OTC: w/ Iron & Folic Acid    PANTOPRAZOLE (PROTONIX) 40 MG TABLET    Take 1 tablet (40 mg total) by mouth once daily.    PROPRANOLOL (INDERAL) 40 MG TABLET    Take 1 tablet (40 mg total) by mouth 2 (two) times daily.        Objective Findings:  Vital Signs:  /71   Pulse 67   Ht 5' 7" (1.702 m)   Wt 93.6 kg (206 lb 6.4 oz)   SpO2 (!) 92%   BMI 32.33 kg/m²  Body mass index is 32.33 kg/m².    Physical Exam:  Physical Exam  Vitals and nursing note reviewed.   Constitutional:       General: She is not in acute distress.     Appearance: Normal appearance. She is not ill-appearing.   HENT:      Mouth/Throat:      Mouth: Mucous membranes are moist.   Cardiovascular:      Rate and Rhythm: Normal rate and regular rhythm.   Pulmonary:      Breath sounds: No wheezing, rhonchi or rales.   Abdominal:      General: Bowel sounds are normal. There is no distension.      Palpations: Abdomen is soft. There is no mass.      Tenderness: There is no abdominal tenderness. There is no guarding or rebound.      Hernia: No hernia is present.   Musculoskeletal:      Right lower leg: No edema.      Left lower leg: No edema.   Skin:     General: Skin is warm and dry.      Coloration: Skin is not jaundiced or pale.   Neurological:      Mental Status: She is alert and oriented to person, place, and time.   Psychiatric:         Mood and Affect: Mood normal.          Labs:  Lab Results   Component Value Date    WBC 7.10 04/01/2022    HGB 13.8 04/01/2022    HCT 42.3 04/01/2022    MCV 90.8 04/01/2022    RDW 13.2 " 04/01/2022     04/01/2022    LYMPH 41.1 (H) 04/01/2022    LYMPH 2.92 04/01/2022    MONO 8.6 (H) 04/01/2022    EOS 0.03 04/01/2022    BASO 0.05 04/01/2022     Lab Results   Component Value Date     04/01/2022    K 4.0 04/01/2022     04/01/2022    CO2 30 04/01/2022    GLU 97 04/01/2022    BUN 7 04/01/2022    CREATININE 0.86 04/01/2022    CALCIUM 8.9 04/01/2022    PROT 7.4 04/01/2022    ALBUMIN 3.7 04/01/2022    BILITOT 1.0 04/01/2022    ALKPHOS 103 04/01/2022    AST 15 04/01/2022    ALT 29 04/01/2022         Imaging: No results found.      Assessment:  Sheri Ho is a 57 y.o. female here with:  1. Liver fibrosis    2. Dysphagia, unspecified type    3. Epigastric pain    4. GERD without esophagitis    5. Constipation, unspecified constipation type          Recommendations:  1. Exercise 150 minutes per week  2. Weight loss of 7-10% body weight  3. Diet low is saturated fats and carbohydrates  4. Good glucose and cholesterol control  5. Repeat ultrasound in 6 months  6. Schedule EGD/Dilation      Follow up in about 6 months (around 12/21/2022).      Order summary:  Orders Placed This Encounter    US Abdomen Limited    EGD w Dilation       Thank you for allowing me to participate in the care of Sheri Ho.      KOKO Gastelum

## 2022-06-21 NOTE — PATIENT INSTRUCTIONS
Continue Linzess  Avoid spicy, greasy foods  Avoid caffeine, citric acid, chocolate, peppermint, and carbonated drinks  Do not lay down within 3 hours of eating  Exercise 150 minutes per week  Increase fluid to 64 ounces daily  Avoid antiinflammatory medications such as motrin, advil, aleve, ibuprofen, and BC powder

## 2022-06-21 NOTE — TELEPHONE ENCOUNTER
Results called to patient. Scheduled for US abd on same day as scheduled office visit. Pt verbalized understanding.    ----- Message from DELFINA Toussaint sent at 6/21/2022 11:06 AM CDT -----  No cirrhosis in the liver. Will repeat liver ultrasound in 6 months. Order is in the computer. We can schedule for the same day as her follow up.

## 2022-07-11 ENCOUNTER — CLINICAL SUPPORT (OUTPATIENT)
Dept: REHABILITATION | Facility: HOSPITAL | Age: 57
End: 2022-07-11
Payer: MEDICARE

## 2022-07-11 ENCOUNTER — OFFICE VISIT (OUTPATIENT)
Dept: FAMILY MEDICINE | Facility: CLINIC | Age: 57
End: 2022-07-11
Payer: MEDICARE

## 2022-07-11 VITALS
BODY MASS INDEX: 32.33 KG/M2 | HEIGHT: 67 IN | DIASTOLIC BLOOD PRESSURE: 98 MMHG | SYSTOLIC BLOOD PRESSURE: 140 MMHG | WEIGHT: 206 LBS | OXYGEN SATURATION: 99 % | HEART RATE: 76 BPM | TEMPERATURE: 98 F | RESPIRATION RATE: 18 BRPM

## 2022-07-11 DIAGNOSIS — T65.91XA ALLERGIC REACTION TO CHEMICAL SUBSTANCE, ACCIDENTAL OR UNINTENTIONAL, INITIAL ENCOUNTER: Primary | ICD-10-CM

## 2022-07-11 DIAGNOSIS — M47.812 CERVICAL SPONDYLOSIS WITHOUT MYELOPATHY: ICD-10-CM

## 2022-07-11 PROCEDURE — 1159F PR MEDICATION LIST DOCUMENTED IN MEDICAL RECORD: ICD-10-PCS | Mod: ,,, | Performed by: FAMILY MEDICINE

## 2022-07-11 PROCEDURE — 3077F PR MOST RECENT SYSTOLIC BLOOD PRESSURE >= 140 MM HG: ICD-10-PCS | Mod: ,,, | Performed by: FAMILY MEDICINE

## 2022-07-11 PROCEDURE — 3008F PR BODY MASS INDEX (BMI) DOCUMENTED: ICD-10-PCS | Mod: ,,, | Performed by: FAMILY MEDICINE

## 2022-07-11 PROCEDURE — 3008F BODY MASS INDEX DOCD: CPT | Mod: ,,, | Performed by: FAMILY MEDICINE

## 2022-07-11 PROCEDURE — 3077F SYST BP >= 140 MM HG: CPT | Mod: ,,, | Performed by: FAMILY MEDICINE

## 2022-07-11 PROCEDURE — 3044F HG A1C LEVEL LT 7.0%: CPT | Mod: ,,, | Performed by: FAMILY MEDICINE

## 2022-07-11 PROCEDURE — 96372 PR INJECTION,THERAP/PROPH/DIAG2ST, IM OR SUBCUT: ICD-10-PCS | Mod: ,,, | Performed by: FAMILY MEDICINE

## 2022-07-11 PROCEDURE — 3044F PR MOST RECENT HEMOGLOBIN A1C LEVEL <7.0%: ICD-10-PCS | Mod: ,,, | Performed by: FAMILY MEDICINE

## 2022-07-11 PROCEDURE — 99213 PR OFFICE/OUTPT VISIT, EST, LEVL III, 20-29 MIN: ICD-10-PCS | Mod: 25,,, | Performed by: FAMILY MEDICINE

## 2022-07-11 PROCEDURE — 97162 PT EVAL MOD COMPLEX 30 MIN: CPT

## 2022-07-11 PROCEDURE — 99213 OFFICE O/P EST LOW 20 MIN: CPT | Mod: 25,,, | Performed by: FAMILY MEDICINE

## 2022-07-11 PROCEDURE — 3080F DIAST BP >= 90 MM HG: CPT | Mod: ,,, | Performed by: FAMILY MEDICINE

## 2022-07-11 PROCEDURE — 1159F MED LIST DOCD IN RCRD: CPT | Mod: ,,, | Performed by: FAMILY MEDICINE

## 2022-07-11 PROCEDURE — 3080F PR MOST RECENT DIASTOLIC BLOOD PRESSURE >= 90 MM HG: ICD-10-PCS | Mod: ,,, | Performed by: FAMILY MEDICINE

## 2022-07-11 PROCEDURE — 96372 THER/PROPH/DIAG INJ SC/IM: CPT | Mod: ,,, | Performed by: FAMILY MEDICINE

## 2022-07-11 RX ORDER — PREDNISONE 20 MG/1
TABLET ORAL
Qty: 10 TABLET | Refills: 0 | Status: SHIPPED | OUTPATIENT
Start: 2022-07-11 | End: 2022-07-29

## 2022-07-11 RX ORDER — CEPHALEXIN 500 MG/1
500 CAPSULE ORAL EVERY 6 HOURS
Qty: 28 CAPSULE | Refills: 0 | Status: SHIPPED | OUTPATIENT
Start: 2022-07-11 | End: 2022-07-29

## 2022-07-11 RX ORDER — DEXAMETHASONE SODIUM PHOSPHATE 4 MG/ML
6 INJECTION, SOLUTION INTRA-ARTICULAR; INTRALESIONAL; INTRAMUSCULAR; INTRAVENOUS; SOFT TISSUE
Status: COMPLETED | OUTPATIENT
Start: 2022-07-11 | End: 2022-07-11

## 2022-07-11 RX ADMIN — DEXAMETHASONE SODIUM PHOSPHATE 6 MG: 4 INJECTION, SOLUTION INTRA-ARTICULAR; INTRALESIONAL; INTRAMUSCULAR; INTRAVENOUS; SOFT TISSUE at 01:07

## 2022-07-11 NOTE — PLAN OF CARE
"[facility]   Physical Therapy Initial Evaluation     Date: 7/11/2022   Name: Sheri Ho  Clinic Number: [id number]    Therapy Diagnosis:   Encounter Diagnosis   Name Primary?    Cervical spondylosis without myelopathy      Physician: Slime Zambrano FNP    Physician Orders: PT Eval and Treat   Medical Diagnosis from Referral: see above  Evaluation Date: 7/11/2022  Authorization Period Expiration: 6/21/23  Plan of Care Expiration: 9/9/2022  Progress Note Due: 8/11/22  Visit # / Visits authorized: 1/ 1   FOTO: 34/49    Precautions: Standard     Time In: 1015  Time Out: 1100  Total Appointment Time (timed & untimed codes): 45 minutes      SUBJECTIVE     Date of onset: 11/2/21    History of current condition - Sheri reports: having right RCR 11/2/21, had a failed surgery and MD went back in to perform RCR on 11/30/22.    Falls: no    Imaging, xray : cervical spine 8/23/22  There is no displaced fracture detected.  Multilevel degenerative endplate and facet changes throughout the cervical spine.    Prior Therapy: right shld ended 5/22  Social History:  unknown  Occupation: patient hasn't worked in 3 years, due to nervous breakdown  Prior Level of Function: independent  Current Level of Function: modified independent, is able to groom hair    Pain:  Current 7-8/10 before meds;  worst 10/10, best 3-4/10   Location:" runs to left shld and down arm, left trunk up to shld"  Description: Aching  Aggravating Factors: looking down when reading, use of shoulder  Easing Factors: heat    Patients goals: get relief of neck pain, be able to move shld and neck better      Medical History:   Past Medical History:   Diagnosis Date    Arthritis     Esophageal dysphagia 4/29/2021    GERD (gastroesophageal reflux disease)     Mitral insufficiency     Panic attacks     SOB (shortness of breath)        Surgical History:   Sheri Ho  has a past surgical history that includes Cholecystectomy; " Colonoscopy; Esophagogastroduodenoscopy; Hysterectomy; Shoulder arthroscopy; Cystoscopy; Rotator cuff repair; Shoulder arthroscopy (Right, 11/30/2021); Arthroscopic repair of rotator cuff of shoulder (Right, 11/30/2021); and Oophorectomy.    Medications:   Sheri has a current medication list which includes the following prescription(s): albuterol, albuterol sulfate, amlodipine, aspirin, rexulti, budesonide-formoterol 160-4.5 mcg, bupropion, cephalexin, cetirizine, cyclobenzaprine, dicyclomine, escitalopram oxalate, estradiol, fluconazole, hydralazine, hydrochlorothiazide, hydrocodone-acetaminophen, hydroxyzine hcl, ibuprofen, linaclotide, loteprednol, mirabegron, multivitamin, pantoprazole, prednisone, and propranolol, and the following Facility-Administered Medications: sodium chloride 0.9%.    Allergies:   Review of patient's allergies indicates:   Allergen Reactions    Codeine Nausea And Vomiting and Swelling          OBJECTIVE      cervical range of motion: rotation left 62, right 48; FB 50, BB25    Left UE   ROM/Strength      Right UE      Shoulder       AROM PROM Strength  AROM PROM Strength   135  5 Flexion 100 132 4- *in active range    68 4 Er in adduction  62 4    L5 5 Functional Ir  L5 5    135 5 Elbow flexion  135 5    0 5 Elbow extension  0 5         Strength (Neurologic testing) 5=WNL  Myotome/Muscle      Left      Right   C1-C2 chin up 5    C1-C2  chin in 5    C3 lateral flexion 5 5   C4 shoulder shrug 5 5   C5 biceps 5 5   C6 wrist extension 5 5   C7 Triceps 5 5          Limitation/Restriction for FOTO neck Survey    Therapist reviewed FOTO scores for Sheri Ho on 7/11/2022.   FOTO documents entered into [employer] - see Media section.    functional Score: 34%         PATIENT EDUCATION AND HOME EXERCISES     Education provided:   - plan of care  -evaluation results    Written Home Exercises Provided: Patient instructed to cont prior HEP.   ASSESSMENT     Sheri is a 57 y.o. female referred to  outpatient Physical Therapy with a medical diagnosis of cervical spondylosis. Patient presents with decreased range of motion, strength cervical spine, right shoulder; demonstrating poor posture, pain.    Patient prognosis is Fair.   Patient will benefit from skilled outpatient Physical Therapy to address the deficits stated above and in the chart below, provide patient /family education, and to maximize patientt's level of independence.     Plan of care discussed with patient: Yes  Patient's spiritual, cultural and educational needs considered and patient is agreeable to the plan of care and goals as stated below:     Anticipated Barriers for therapy: chronic pain    Goals:  Short Term Goals: 4 weeks   1.  Improve right shoulder ACTIVE ROM flexion to >130, er >65, ir to L4  2.  Improve right shld strength to 4  3.  Reach in overhead shelf for 2# weight /s deviation  4.  Tolerate 45 minutes of exercise with <7/10 pain in neck and right shld.  Long Term Goals: 8 weeks   1.  Improve right shld ACTIVE ROM >140, ir to L2  2.  Improve right shld strength to 4+  3.  Reach in overhead shelf for 4# weight /s deviation  4.  Tolerate 5 minutes of position tolerance overhead with <5/10.  PLAN   Plan of care Certification: 7/11/2022 to 9/9/22.    Outpatient Physical Therapy 2 times weekly for 8 weeks to include the following interventions: Cervical/Lumbar Traction, Electrical Stimulation 0-300hz, Iontophoresis (with 2.0 ml dexamethasone), Manual Therapy, Moist Heat/ Ice, Neuromuscular Re-ed, Patient Education, Self Care, Therapeutic Activities, Therapeutic Exercise and Ultrasound.     ROZINA STANFORD, PT      I CERTIFY THE NEED FOR THESE SERVICES FURNISHED UNDER THIS PLAN OF TREATMENT AND WHILE UNDER MY CARE   Physician's comments:     Physician's Signature: ___________________________________________________

## 2022-07-11 NOTE — PROGRESS NOTES
Subjective:       Patient ID: Sheri Ho is a 57 y.o. female.    Chief Complaint: Foot Pain ( (MD 3) Left foot swelling. States she got a tattoo and grandchild cleaned it with Dove soap.)    Patient said it was very swollen several days ago.  Much better now but still swollen.    Review of Systems      Objective:      Physical Exam  Constitutional:       General: She is not in acute distress.     Appearance: Normal appearance. She is not ill-appearing.   Cardiovascular:      Rate and Rhythm: Normal rate and regular rhythm.   Pulmonary:      Breath sounds: Normal breath sounds.   Musculoskeletal:         General: Tenderness (No calf or popliteal tenderness to suggest DVT) present.      Left lower leg: Edema ( 1+) present.   Skin:     Findings: No erythema or rash ( mild diffuse inflammation around the attached to left lower leg.).   Neurological:      Mental Status: She is alert.         Assessment:       Problem List Items Addressed This Visit    None     Visit Diagnoses     Allergic reaction to chemical substance, accidental or unintentional, initial encounter    -  Primary          Plan:         probably a local reaction to chemical in soap

## 2022-07-18 ENCOUNTER — CLINICAL SUPPORT (OUTPATIENT)
Dept: REHABILITATION | Facility: HOSPITAL | Age: 57
End: 2022-07-18
Payer: MEDICARE

## 2022-07-18 DIAGNOSIS — M47.812 CERVICAL SPONDYLOSIS WITHOUT MYELOPATHY: Primary | ICD-10-CM

## 2022-07-18 PROCEDURE — 97140 MANUAL THERAPY 1/> REGIONS: CPT | Mod: CQ

## 2022-07-18 PROCEDURE — 97110 THERAPEUTIC EXERCISES: CPT | Mod: CQ

## 2022-07-18 NOTE — PROGRESS NOTES
[facility]   Physical Therapy Treatment Note     Name: Sheri Ho  Clinic Number: [id number]    Therapy Diagnosis: No diagnosis found.  Physician: Slime Zambrano FNP    Visit Date: 7/18/2022    Physician: Slime Zambrano FNP     Physician Orders: PT Eval and Treat   Medical Diagnosis from Referral: see above  Evaluation Date: 7/11/2022  Authorization Period Expiration: 6/21/23  Plan of Care Expiration: 9/9/2022  Progress Note Due: 8/11/22  Visit # / Visits authorized: 1/ 17   FOTO: 34/49      PTA Visit #: 1/5     Time In: 7:50 am   Time Out: 8:30 am   Total Billable Time: 40 minutes    SUBJECTIVE     Pt reports: why am working on the same things I did when I was here for my shouler.  She was compliant with home exercise program.  Response to previous treatment: first treatment after evaluation   Functional change: none     Pain: 5/10  Location: bilateral neck      OBJECTIVE     Objective Measures updated at progress report unless specified.     Treatment     Sheri received the treatments listed below:      therapeutic exercises to develop strength, ROM and posture for 30 minutes including:  UBE 5 minutes  Pulleys 3 minutes  Corner stretch 4 x 15 seconds  Bilateral shoulder external rotation 2 x 10 red TB  Shoulder horizontal abduction 2 x 10 red TB  Cybex rows close 2 x 10 3#                       Wide 2 x 10 3#  Cervical range of motion forward/flexion 2 x 10          Side bending 2 x 10           Rotation 2 x 10     manual therapy techniques: Joint mobilizations and Myofacial release were applied to the: neck for 10 minutes, including:  Upper trap stretch   Scales stretch   MFR to neck      supervised modalities after being cleared for contradictions: TENS:  Sheri received TENS electrical stimulation for pain to the neck. Pt received continuous mode for N/A minutes. Sheri tolerated treatment well without any adverse effects.         Patient Education and Home Exercises      Home Exercises Provided and Patient Education Provided     Education provided:   - encouraged to continue HEP    Written Home Exercises Provided: Patient instructed to cont prior HEP. Exercises were reviewed and Sehri was able to demonstrate them prior to the end of the session.  Sheri demonstrated good  understanding of the education provided. See EMR under Patient Instructions for exercises provided during therapy sessions    ASSESSMENT     Initiated plan of care. Patient was informed on the correct body mechanics to perform exercises. Patient is very tight in bilateral upper traps. Patient continue to have decreased range of motion in right shoulder.    Sheri Is progressing towards her goals.   Pt prognosis is Good.     Pt will continue to benefit from skilled outpatient physical therapy to address the deficits listed in the problem list box on initial evaluation, provide pt/family education and to maximize pt's level of independence in the home and community environment.     Pt's spiritual, cultural and educational needs considered and pt agreeable to plan of care and goals.     Anticipated barriers to physical therapy: none    Goals:   Short Term Goals: 4 weeks   1.  Improve right shoulder ACTIVE ROM flexion to >130, er >65, ir to L4  2.  Improve right shld strength to 4  3.  Reach in overhead shelf for 2# weight /s deviation  4.  Tolerate 45 minutes of exercise with <7/10 pain in neck and right shld.  Long Term Goals: 8 weeks   1.  Improve right shld ACTIVE ROM >140, ir to L2  2.  Improve right shld strength to 4+  3.  Reach in overhead shelf for 4# weight /s deviation  4.  Tolerate 5 minutes of position tolerance overhead with <5/10.      PLAN     Plan of care Certification: 7/11/2022 to 9/9/22.     Outpatient Physical Therapy 2 times weekly for 8 weeks to include the following interventions: Cervical/Lumbar Traction, Electrical Stimulation 0-300hz, Iontophoresis (with 2.0 ml dexamethasone), Manual Therapy,  Moist Heat/ Ice, Neuromuscular Re-ed, Patient Education, Self Care, Therapeutic Activities, Therapeutic Exercise and Ultrasound.       Kelly Trujillo, PTA

## 2022-07-20 ENCOUNTER — CLINICAL SUPPORT (OUTPATIENT)
Dept: REHABILITATION | Facility: HOSPITAL | Age: 57
End: 2022-07-20
Payer: MEDICARE

## 2022-07-20 DIAGNOSIS — M47.812 CERVICAL SPONDYLOSIS WITHOUT MYELOPATHY: Primary | ICD-10-CM

## 2022-07-20 DIAGNOSIS — Z98.890 S/P RIGHT ROTATOR CUFF REPAIR: ICD-10-CM

## 2022-07-20 PROCEDURE — 97140 MANUAL THERAPY 1/> REGIONS: CPT

## 2022-07-20 PROCEDURE — 97110 THERAPEUTIC EXERCISES: CPT

## 2022-07-20 NOTE — PROGRESS NOTES
[facility]   Physical Therapy Treatment Note     Name: Sheri Ho  Clinic Number: [id number]    Therapy Diagnosis: neck pain, right shoulder pain  Physician: Slime Zambrano FNP    Visit Date: 7/20/2022    Physician: Slime Zambrano FNP     Physician Orders: PT Eval and Treat   Medical Diagnosis from Referral: see above  Evaluation Date: 7/11/2022  Authorization Period Expiration: 6/21/23  Plan of Care Expiration: 9/9/2022  Progress Note Due: 8/11/22  Visit # / Visits authorized: 3/17   FOTO: 34/49      PTA Visit #: 0/5     Time In: 0755  Time Out: 0830   Total Billable Time: 35 minutes    SUBJECTIVE     Pt reports: I liked the cream she put on me last time.  Pt arrived 10 minutes late.  She was compliant with home exercise program.  Response to previous treatment:   Functional change: none     Pain: 5/10  Location: bilateral neck      OBJECTIVE     Objective Measures updated at progress report unless specified.     Treatment     Sheri received the treatments listed below:      therapeutic exercises to develop strength, ROM and posture for 30 minutes including:  UBE 0 minutes  Pulleys 0 minutes  Corner stretch 4 x 30 sh  Bilateral shoulder external rotation   Shoulder horizontal abduction   Cybex rows close                        Wide   Supine cane flexion stretch [id number]  Supine cane flexion 2# x 20  Standing right shld flexion yellow x 20  Standing scaption 2# x 20  Scap retract / green x 20    manual therapy techniques: Joint mobilizations and Myofacial release were applied to the: neck for 15 minutes, including:  Upper trap stretch  4 x 30 sh  Scalene stretch   Graston: #5, #4 to scalenes, upper traps, suboccipitals; #3 j-stroke to cervical and thoracic paraspinals  Biofreeze to B upper trap      supervised modalities after being cleared for contradictions: TENS:  Sheri received TENS electrical stimulation for pain to the neck. Pt received continuous mode for N/A minutes.  Sheri tolerated treatment well without any adverse effects.         Patient Education and Home Exercises     Home Exercises Provided and Patient Education Provided     Education provided:   - encouraged to continue HEP    Written Home Exercises Provided: Patient instructed to cont prior HEP. Exercises were reviewed and Sheri was able to demonstrate them prior to the end of the session.  Sheri demonstrated good  understanding of the education provided. See EMR under Patient Instructions for exercises provided during therapy sessions    ASSESSMENT     Initiated plan of care. Patient was informed on the correct body mechanics to perform exercises. Patient is very tight in bilateral upper traps. Patient continue to have decreased range of motion in right shoulder.    Sheri Is progressing towards her goals.   Pt prognosis is Good.     Pt will continue to benefit from skilled outpatient physical therapy to address the deficits listed in the problem list box on initial evaluation, provide pt/family education and to maximize pt's level of independence in the home and community environment.     Pt's spiritual, cultural and educational needs considered and pt agreeable to plan of care and goals.     Anticipated barriers to physical therapy: none    Goals:   Short Term Goals: 4 weeks   1.  Improve right shoulder ACTIVE ROM flexion to >130, er >65, ir to L4  2.  Improve right shld strength to 4  3.  Reach in overhead shelf for 2# weight /s deviation  4.  Tolerate 45 minutes of exercise with <7/10 pain in neck and right shld.  Long Term Goals: 8 weeks   1.  Improve right shld ACTIVE ROM >140, ir to L2  2.  Improve right shld strength to 4+  3.  Reach in overhead shelf for 4# weight /s deviation  4.  Tolerate 5 minutes of position tolerance overhead with <5/10.      PLAN     Plan of care Certification: 7/11/2022 to 9/9/22.     Outpatient Physical Therapy 2 times weekly for 8 weeks to include the following interventions:  Cervical/Lumbar Traction, Electrical Stimulation 0-300hz, Iontophoresis (with 2.0 ml dexamethasone), Manual Therapy, Moist Heat/ Ice, Neuromuscular Re-ed, Patient Education, Self Care, Therapeutic Activities, Therapeutic Exercise and Ultrasound.       ROZINA STANFORD, PT

## 2022-07-25 ENCOUNTER — CLINICAL SUPPORT (OUTPATIENT)
Dept: REHABILITATION | Facility: HOSPITAL | Age: 57
End: 2022-07-25
Payer: MEDICARE

## 2022-07-25 DIAGNOSIS — Z98.890 S/P RIGHT ROTATOR CUFF REPAIR: Primary | ICD-10-CM

## 2022-07-25 PROCEDURE — 97110 THERAPEUTIC EXERCISES: CPT

## 2022-07-25 PROCEDURE — 97140 MANUAL THERAPY 1/> REGIONS: CPT

## 2022-07-25 NOTE — PROGRESS NOTES
[facility]   Physical Therapy Treatment Note     Name: Sheri Ho  Clinic Number: [id number]    Therapy Diagnosis: neck pain, right shoulder pain  Physician: Slime Zambrano FNP    Visit Date: 7/25/2022    Physician: Slime Zambrano FNP     Physician Orders: PT Eval and Treat   Medical Diagnosis from Referral: see above  Evaluation Date: 7/11/2022  Authorization Period Expiration: 6/21/23  Plan of Care Expiration: 9/9/2022  Progress Note Due: 8/11/22  Visit # / Visits authorized: 4/17   FOTO: 34/49    PTA Visit #: 0/5     Time In: 0750  Time Out: 0830   Total Billable Time: 40 minutes    SUBJECTIVE     Pt reports :I have moderate pain; pt reported pain in right upper traps 3/10     She was compliant with home exercise program.  Response to previous treatment:   Functional change: none     Pain: 3/10  Location: bilateral neck      OBJECTIVE     Objective Measures updated at progress report unless specified.   Right shoulder flexion 115, er 82, ir L3  Treatment     Sheri received the treatments listed below:      therapeutic exercises to develop strength, ROM and posture for 20 minutes including:  UBE 0 minutes  Pulleys 0 minutes  Corner stretch   Bilateral shoulder external rotation sidelying 2# x 30  Shoulder horizontal abduction   Cybex rows close                        Wide   Supine cane flexion stretch [id number]  Supine cane flexion 2# x 20  Standing right shld flexion yellow x 20  Standing scaption 2# x 20  Scap retract / green x 20    manual therapy techniques: Joint mobilizations and Myofacial release were applied to the: neck for 20 minutes, including:  Upper trap stretch  4 x 30 sh  Scalene stretch   Graston: #5, #4 to scalenes, upper traps, suboccipitals; #3 j-stroke to cervical and thoracic paraspinals.    supervised modalities after being cleared for contradictions: TENS:  Sheri received TENS electrical stimulation for pain to the neck. Pt received continuous mode for  N/A minutes. Sheri tolerated treatment well without any adverse effects.         Patient Education and Home Exercises     Home Exercises Provided and Patient Education Provided     Education provided:   - encouraged to continue HEP    Written Home Exercises Provided: Patient instructed to cont prior HEP. Exercises were reviewed and Sheri was able to demonstrate them prior to the end of the session.  Sheri demonstrated good  understanding of the education provided. See EMR under Patient Instructions for exercises provided during therapy sessions    ASSESSMENT     Initiated plan of care. Patient was informed on the correct body mechanics to perform exercises. Patient is very tight in bilateral upper traps. Patient continue to have decreased range of motion in right shoulder.    Sheri Is progressing towards her goals.   Pt prognosis is Good.     Pt will continue to benefit from skilled outpatient physical therapy to address the deficits listed in the problem list box on initial evaluation, provide pt/family education and to maximize pt's level of independence in the home and community environment.     Pt's spiritual, cultural and educational needs considered and pt agreeable to plan of care and goals.     Anticipated barriers to physical therapy: none    Goals:   Short Term Goals: 4 weeks   1.  Improve right shoulder ACTIVE ROM flexion to >130, er >65, ir to L4  2.  Improve right shld strength to 4  3.  Reach in overhead shelf for 2# weight /s deviation  4.  Tolerate 45 minutes of exercise with <7/10 pain in neck and right shld.  Long Term Goals: 8 weeks   1.  Improve right shld ACTIVE ROM >140, ir to L2  2.  Improve right shld strength to 4+  3.  Reach in overhead shelf for 4# weight /s deviation  4.  Tolerate 5 minutes of position tolerance overhead with <5/10.      PLAN     Plan of care Certification: 7/11/2022 to 9/9/22.     Outpatient Physical Therapy 2 times weekly for 8 weeks to include the following interventions:  Cervical/Lumbar Traction, Electrical Stimulation 0-300hz, Iontophoresis (with 2.0 ml dexamethasone), Manual Therapy, Moist Heat/ Ice, Neuromuscular Re-ed, Patient Education, Self Care, Therapeutic Activities, Therapeutic Exercise and Ultrasound.       ROZINA STANFORD, PT

## 2022-07-29 ENCOUNTER — HOSPITAL ENCOUNTER (OUTPATIENT)
Dept: GASTROENTEROLOGY | Facility: HOSPITAL | Age: 57
Discharge: HOME | End: 2022-07-29
Attending: NURSE PRACTITIONER
Payer: MEDICARE

## 2022-07-29 ENCOUNTER — OFFICE VISIT (OUTPATIENT)
Dept: FAMILY MEDICINE | Facility: CLINIC | Age: 57
End: 2022-07-29
Payer: MEDICARE

## 2022-07-29 ENCOUNTER — ANESTHESIA EVENT (OUTPATIENT)
Dept: GASTROENTEROLOGY | Facility: HOSPITAL | Age: 57
End: 2022-07-29
Payer: MEDICARE

## 2022-07-29 ENCOUNTER — ANESTHESIA (OUTPATIENT)
Dept: GASTROENTEROLOGY | Facility: HOSPITAL | Age: 57
End: 2022-07-29
Payer: MEDICARE

## 2022-07-29 VITALS
DIASTOLIC BLOOD PRESSURE: 70 MMHG | WEIGHT: 212 LBS | HEIGHT: 67 IN | HEART RATE: 78 BPM | RESPIRATION RATE: 16 BRPM | TEMPERATURE: 98 F | BODY MASS INDEX: 33.27 KG/M2 | SYSTOLIC BLOOD PRESSURE: 120 MMHG

## 2022-07-29 VITALS
HEART RATE: 79 BPM | DIASTOLIC BLOOD PRESSURE: 73 MMHG | HEIGHT: 67 IN | TEMPERATURE: 98 F | OXYGEN SATURATION: 98 % | BODY MASS INDEX: 32.33 KG/M2 | RESPIRATION RATE: 20 BRPM | WEIGHT: 206 LBS | SYSTOLIC BLOOD PRESSURE: 131 MMHG

## 2022-07-29 VITALS — DIASTOLIC BLOOD PRESSURE: 52 MMHG | OXYGEN SATURATION: 99 % | HEART RATE: 86 BPM | SYSTOLIC BLOOD PRESSURE: 110 MMHG

## 2022-07-29 DIAGNOSIS — R13.10 DYSPHAGIA, UNSPECIFIED TYPE: ICD-10-CM

## 2022-07-29 DIAGNOSIS — K31.84 GASTROPARESIS: ICD-10-CM

## 2022-07-29 DIAGNOSIS — F33.1 MODERATE EPISODE OF RECURRENT MAJOR DEPRESSIVE DISORDER: Primary | ICD-10-CM

## 2022-07-29 DIAGNOSIS — F41.0 PANIC ATTACKS: ICD-10-CM

## 2022-07-29 DIAGNOSIS — R13.19 ESOPHAGEAL DYSPHAGIA: ICD-10-CM

## 2022-07-29 DIAGNOSIS — Z79.899 ENCOUNTER FOR LONG-TERM (CURRENT) USE OF OTHER MEDICATIONS: ICD-10-CM

## 2022-07-29 PROCEDURE — 3008F PR BODY MASS INDEX (BMI) DOCUMENTED: ICD-10-PCS | Mod: ,,, | Performed by: FAMILY MEDICINE

## 2022-07-29 PROCEDURE — 63600175 PHARM REV CODE 636 W HCPCS: Performed by: NURSE ANESTHETIST, CERTIFIED REGISTERED

## 2022-07-29 PROCEDURE — D9220A PRA ANESTHESIA: ICD-10-PCS | Mod: ,,, | Performed by: NURSE ANESTHETIST, CERTIFIED REGISTERED

## 2022-07-29 PROCEDURE — 27000716 HC OXISENSOR PROBE, ANY SIZE: Performed by: NURSE ANESTHETIST, CERTIFIED REGISTERED

## 2022-07-29 PROCEDURE — 99213 OFFICE O/P EST LOW 20 MIN: CPT | Mod: ,,, | Performed by: FAMILY MEDICINE

## 2022-07-29 PROCEDURE — 3008F BODY MASS INDEX DOCD: CPT | Mod: ,,, | Performed by: FAMILY MEDICINE

## 2022-07-29 PROCEDURE — 3074F PR MOST RECENT SYSTOLIC BLOOD PRESSURE < 130 MM HG: ICD-10-PCS | Mod: ,,, | Performed by: FAMILY MEDICINE

## 2022-07-29 PROCEDURE — 3044F PR MOST RECENT HEMOGLOBIN A1C LEVEL <7.0%: ICD-10-PCS | Mod: ,,, | Performed by: FAMILY MEDICINE

## 2022-07-29 PROCEDURE — 1159F PR MEDICATION LIST DOCUMENTED IN MEDICAL RECORD: ICD-10-PCS | Mod: ,,, | Performed by: FAMILY MEDICINE

## 2022-07-29 PROCEDURE — 25000003 PHARM REV CODE 250: Performed by: NURSE ANESTHETIST, CERTIFIED REGISTERED

## 2022-07-29 PROCEDURE — 37000008 HC ANESTHESIA 1ST 15 MINUTES

## 2022-07-29 PROCEDURE — 3078F PR MOST RECENT DIASTOLIC BLOOD PRESSURE < 80 MM HG: ICD-10-PCS | Mod: ,,, | Performed by: FAMILY MEDICINE

## 2022-07-29 PROCEDURE — 3074F SYST BP LT 130 MM HG: CPT | Mod: ,,, | Performed by: FAMILY MEDICINE

## 2022-07-29 PROCEDURE — 27201423 OPTIME MED/SURG SUP & DEVICES STERILE SUPPLY

## 2022-07-29 PROCEDURE — D9220A PRA ANESTHESIA: Mod: ,,, | Performed by: NURSE ANESTHETIST, CERTIFIED REGISTERED

## 2022-07-29 PROCEDURE — 1160F PR REVIEW ALL MEDS BY PRESCRIBER/CLIN PHARMACIST DOCUMENTED: ICD-10-PCS | Mod: ,,, | Performed by: FAMILY MEDICINE

## 2022-07-29 PROCEDURE — 43450 DILATE ESOPHAGUS 1/MULT PASS: CPT

## 2022-07-29 PROCEDURE — 99213 PR OFFICE/OUTPT VISIT, EST, LEVL III, 20-29 MIN: ICD-10-PCS | Mod: ,,, | Performed by: FAMILY MEDICINE

## 2022-07-29 PROCEDURE — 37000009 HC ANESTHESIA EA ADD 15 MINS

## 2022-07-29 PROCEDURE — 27000284 HC CANNULA NASAL: Performed by: NURSE ANESTHETIST, CERTIFIED REGISTERED

## 2022-07-29 PROCEDURE — 1159F MED LIST DOCD IN RCRD: CPT | Mod: ,,, | Performed by: FAMILY MEDICINE

## 2022-07-29 PROCEDURE — 1160F RVW MEDS BY RX/DR IN RCRD: CPT | Mod: ,,, | Performed by: FAMILY MEDICINE

## 2022-07-29 PROCEDURE — 3078F DIAST BP <80 MM HG: CPT | Mod: ,,, | Performed by: FAMILY MEDICINE

## 2022-07-29 PROCEDURE — 43235 EGD DIAGNOSTIC BRUSH WASH: CPT

## 2022-07-29 PROCEDURE — 3044F HG A1C LEVEL LT 7.0%: CPT | Mod: ,,, | Performed by: FAMILY MEDICINE

## 2022-07-29 RX ORDER — PROPOFOL 10 MG/ML
VIAL (ML) INTRAVENOUS
Status: DISCONTINUED | OUTPATIENT
Start: 2022-07-29 | End: 2022-07-29

## 2022-07-29 RX ORDER — HYDROXYZINE HYDROCHLORIDE 10 MG/1
TABLET, FILM COATED ORAL
Qty: 120 TABLET | Refills: 5 | Status: SHIPPED | OUTPATIENT
Start: 2022-07-29

## 2022-07-29 RX ORDER — LIDOCAINE HYDROCHLORIDE 20 MG/ML
INJECTION, SOLUTION EPIDURAL; INFILTRATION; INTRACAUDAL; PERINEURAL
Status: DISCONTINUED | OUTPATIENT
Start: 2022-07-29 | End: 2022-07-29

## 2022-07-29 RX ORDER — BREXPIPRAZOLE 1 MG/1
TABLET ORAL
Qty: 90 TABLET | Refills: 3 | Status: SHIPPED | OUTPATIENT
Start: 2022-07-29 | End: 2023-10-30 | Stop reason: SDUPTHER

## 2022-07-29 RX ORDER — SODIUM CHLORIDE 0.9 % (FLUSH) 0.9 %
10 SYRINGE (ML) INJECTION
Status: DISCONTINUED | OUTPATIENT
Start: 2022-07-29 | End: 2022-07-30 | Stop reason: HOSPADM

## 2022-07-29 RX ADMIN — LIDOCAINE HYDROCHLORIDE 70 MG: 20 INJECTION, SOLUTION INTRAVENOUS at 07:07

## 2022-07-29 RX ADMIN — PROPOFOL 50 MG: 10 INJECTION, EMULSION INTRAVENOUS at 07:07

## 2022-07-29 RX ADMIN — SODIUM CHLORIDE: 9 INJECTION, SOLUTION INTRAVENOUS at 07:07

## 2022-07-29 RX ADMIN — PROPOFOL 70 MG: 10 INJECTION, EMULSION INTRAVENOUS at 07:07

## 2022-07-29 NOTE — PROGRESS NOTES
Subjective:       Patient ID: Sheri Ho is a 57 y.o. female.    Chief Complaint: Follow-up (Check up)    Follow up.  Had EGD this morning.  Possibly has gastroparesis, has another test set up.    Still having anxiety/panic attacks/depression.  Never got rexulti rx (had samples)--insurance issue?    Review of Systems   Constitutional: Negative for appetite change, chills, fatigue, fever and unexpected weight change.   Respiratory: Negative for cough and shortness of breath.    Cardiovascular: Negative for chest pain and leg swelling.   Gastrointestinal: Negative for abdominal pain.   Musculoskeletal: Negative for arthralgias.   Integumentary:  Negative for rash.   Neurological: Negative for weakness.   Psychiatric/Behavioral: Positive for dysphoric mood. The patient is nervous/anxious.          Objective:      Physical Exam  Constitutional:       General: She is not in acute distress.     Appearance: Normal appearance.   Cardiovascular:      Rate and Rhythm: Normal rate and regular rhythm.      Heart sounds: Normal heart sounds.   Pulmonary:      Breath sounds: Normal breath sounds.   Abdominal:      General: Abdomen is flat.      Palpations: Abdomen is soft.   Skin:     General: Skin is warm and dry.   Neurological:      Mental Status: She is alert. Mental status is at baseline.   Psychiatric:         Mood and Affect: Mood normal.         Behavior: Behavior normal.         Thought Content: Thought content normal.         Judgment: Judgment normal.         Assessment:       1. Moderate episode of recurrent major depressive disorder     2. Panic attacks     3. Encounter for long-term (current) use of other medications         Plan:       Getting shingles vacc from pharm  Atarax for nerves  Check on rexulti, samples of 2mg given bc that is all we had but she should tolerate.  UTD eye exam

## 2022-07-29 NOTE — TRANSFER OF CARE
"Anesthesia Transfer of Care Note    Patient: Sheri Ho    Procedure(s) Performed: * No procedures listed *    Patient location: GI    Anesthesia Type: general    Transport from OR: Transported from OR on room air with adequate spontaneous ventilation. Continuous ECG monitoring in transport. Continuous SpO2 monitoring in transport    Post pain: adequate analgesia    Post assessment: no apparent anesthetic complications    Post vital signs: stable    Level of consciousness: sedated and responds to stimulation    Nausea/Vomiting: no nausea/vomiting    Complications: none    Transfer of care protocol was followedComments: Good SV continue, NAD, VSS, RTRN      Last vitals:   Visit Vitals  /79 (BP Location: Left arm, Patient Position: Lying)   Pulse 73   Temp 36.6 °C (97.9 °F)   Resp 20   Ht 5' 7" (1.702 m)   Wt 93.4 kg (206 lb)   SpO2 98%   Breastfeeding No   BMI 32.26 kg/m²     "

## 2022-07-29 NOTE — ANESTHESIA PREPROCEDURE EVALUATION
07/29/2022  Sheri Ho is a 57 y.o., female.    Past Medical History:   Diagnosis Date    Arthritis     Esophageal dysphagia 4/29/2021    GERD (gastroesophageal reflux disease)     Mitral insufficiency     Panic attacks     SOB (shortness of breath)        Past Surgical History:   Procedure Laterality Date    ARTHROSCOPIC REPAIR OF ROTATOR CUFF OF SHOULDER Right 11/30/2021    Procedure: REPAIR, ROTATOR CUFF, ARTHROSCOPIC;  Surgeon: Cain Treviño MD;  Location: [facility];  Service: Orthopedics;  Laterality: Right;    CHOLECYSTECTOMY      COLONOSCOPY      CYSTOSCOPY      ESOPHAGOGASTRODUODENOSCOPY      HYSTERECTOMY      OOPHORECTOMY      ROTATOR CUFF REPAIR      SHOULDER ARTHROSCOPY      SHOULDER ARTHROSCOPY Right 11/30/2021    Procedure: ARTHROSCOPY, SHOULDER;  Surgeon: Cain Treviño MD;  Location: [facility];  Service: Orthopedics;  Laterality: Right;       Family History   Problem Relation Age of Onset    Hypertension Mother     Diabetes Father     Hypertension Father     Heart disease Father     Heart attack Father     Hypertension Sister     Hypertension Maternal Grandmother     Breast cancer Cousin        Social History     Socioeconomic History    Marital status:    Tobacco Use    Smoking status: Never Smoker    Smokeless tobacco: Never Used   Substance and Sexual Activity    Alcohol use: Never    Drug use: Never       Current Outpatient Medications   Medication Sig Dispense Refill    albuterol (PROVENTIL/VENTOLIN HFA) 90 mcg/actuation inhaler Inhale 2 puffs into the lungs every 4 (four) hours as needed for Wheezing. 54 g 3    albuterol sulfate 90 mcg/actuation aebs Inhale 180 mcg into the lungs every 6 (six) hours as needed.      amLODIPine (NORVASC) 5 MG tablet Take 0.5 tablets (2.5 mg total) by mouth once daily. 90 tablet 3     aspirin (ECOTRIN) 81 MG EC tablet Take 81 mg by mouth once daily.      brexpiprazole (REXULTI) 1 mg Tab One a day for depression 90 tablet 3    budesonide-formoterol 160-4.5 mcg (SYMBICORT) 160-4.5 mcg/actuation HFAA Inhale 2 puffs into the lungs 2 (two) times a day. 30.6 g 3    buPROPion (WELLBUTRIN SR) 100 MG TBSR 12 hr tablet Take 1 tablet (100 mg total) by mouth once daily. 90 tablet 3    cetirizine (ZYRTEC) 10 MG tablet Take 1 tablet (10 mg total) by mouth once daily. 90 tablet 3    cyclobenzaprine (FLEXERIL) 10 MG tablet Take 10 mg by mouth 3 (three) times daily.      dicyclomine (BENTYL) 20 mg tablet       EScitalopram oxalate (LEXAPRO) 20 MG tablet Take 1 tablet (20 mg total) by mouth once daily. 90 tablet 3    hydrALAZINE (APRESOLINE) 10 MG tablet Take 1 tablet (10 mg total) by mouth 2 (two) times daily. 180 tablet 3    hydroCHLOROthiazide (HYDRODIURIL) 12.5 MG Tab TAKE 1 TABLET ONE TIME DAILY 90 tablet 0    HYDROcodone-acetaminophen (NORCO)  mg per tablet Take 1 tablet by mouth every 8 (eight) hours as needed.      hydrOXYzine HCL (ATARAX) 10 MG Tab       ibuprofen (ADVIL,MOTRIN) 800 MG tablet Take 1 tablet (800 mg total) by mouth 2 (two) times a day. 180 tablet 1    linaCLOtide (LINZESS) 145 mcg Cap capsule Take 1 capsule (145 mcg total) by mouth before breakfast. 90 capsule 3    loteprednol (LOTEMAX) 0.5 % ophthalmic suspension 1 drop. As directed      MIRABEGRON ORAL Take by mouth.      multivitamin (MULTIPLE VITAMINS ORAL) Take 1 tablet by mouth once daily. OTC: w/ Iron & Folic Acid      pantoprazole (PROTONIX) 40 MG tablet Take 1 tablet (40 mg total) by mouth once daily. 90 tablet 3    propranoloL (INDERAL) 40 MG tablet Take 1 tablet (40 mg total) by mouth 2 (two) times daily. 180 tablet 3     No current facility-administered medications for this encounter.     Facility-Administered Medications Ordered in Other Encounters   Medication Dose Route Frequency Provider Last Rate  Last Admin    0.9%  NaCl infusion   Intravenous Continuous Cain Treviño  mL/hr at 11/30/21 1144 New Bag at 11/30/21 1144       Review of patient's allergies indicates:   Allergen Reactions    Codeine Nausea And Vomiting and Swelling       Pre-op Assessment    I have reviewed the Patient Summary Reports.     I have reviewed the Nursing Notes. I have reviewed the NPO Status.   I have reviewed the Medications.     Review of Systems  Anesthesia Hx:  No problems with previous Anesthesia  Denies Family Hx of Anesthesia complications.   Denies Personal Hx of Anesthesia complications.   Hematology/Oncology:     Oncology Normal     EENT/Dental:EENT/Dental Normal   Cardiovascular:   Hypertension hyperlipidemia    Pulmonary:   Asthma Shortness of breath    Renal/:  Renal/ Normal     Hepatic/GI:   GERD Liver Disease,    Musculoskeletal:  Musculoskeletal Normal    Neurological:  Neurology Normal    Endocrine:   Diabetes, type 2    Dermatological:  Skin Normal    Psych:   Psychiatric History anxiety          Physical Exam  General: Well nourished, Alert, Oriented and Cooperative    Airway:  Mouth Opening: Normal  Neck ROM: Normal ROM    Chest/Lungs:  Normal Respiratory Rate    Heart:  Rate: Normal        Anesthesia Plan  Type of Anesthesia, risks & benefits discussed:    Anesthesia Type: Gen Natural Airway, MAC  Intra-op Monitoring Plan: Standard ASA Monitors  Post Op Pain Control Plan: multimodal analgesia and IV/PO Opioids PRN  Induction:  IV  Informed Consent: Informed consent signed with the Patient and all parties understand the risks and agree with anesthesia plan.  All questions answered. Patient consented to blood products? Yes  ASA Score: 3  Day of Surgery Review of History & Physical: I have interviewed and examined the patient. I have reviewed the patient's H&P dated: There are no significant changes.     Ready For Surgery From Anesthesia Perspective.     .

## 2022-07-29 NOTE — H&P
[facility] - Endoscopy  Gastroenterology  H&P    Patient Name: Sheri Ho  MRN: [id number]  Admission Date: 7/29/2022  Code Status: Full Code    Attending Provider: Jose A Correa MD  Primary Care Physician: Rajni Barton MD  Principal Problem:<principal problem not specified>    Subjective:     History of Present Illness: Pt has esophageal dysphagia; for egd with dilation.    Past Medical History:   Diagnosis Date    Arthritis     Esophageal dysphagia 4/29/2021    GERD (gastroesophageal reflux disease)     Mitral insufficiency     Panic attacks     SOB (shortness of breath)        Past Surgical History:   Procedure Laterality Date    ARTHROSCOPIC REPAIR OF ROTATOR CUFF OF SHOULDER Right 11/30/2021    Procedure: REPAIR, ROTATOR CUFF, ARTHROSCOPIC;  Surgeon: Cain Treviño MD;  Location: [facility];  Service: Orthopedics;  Laterality: Right;    CHOLECYSTECTOMY      COLONOSCOPY      CYSTOSCOPY      ESOPHAGOGASTRODUODENOSCOPY      HYSTERECTOMY      OOPHORECTOMY      ROTATOR CUFF REPAIR      SHOULDER ARTHROSCOPY      SHOULDER ARTHROSCOPY Right 11/30/2021    Procedure: ARTHROSCOPY, SHOULDER;  Surgeon: Cain Treviño MD;  Location: [facility];  Service: Orthopedics;  Laterality: Right;       Review of patient's allergies indicates:   Allergen Reactions    Codeine Nausea And Vomiting and Swelling     Family History     Problem Relation (Age of Onset)    Breast cancer Cousin    Diabetes Father    Heart attack Father    Heart disease Father    Hypertension Mother, Father, Sister, Maternal Grandmother        Tobacco Use    Smoking status: Never Smoker    Smokeless tobacco: Never Used   Substance and Sexual Activity    Alcohol use: Never    Drug use: Never    Sexual activity: Not on file     Review of Systems   Respiratory: Negative.    Cardiovascular: Negative.    Gastrointestinal: Negative.      Objective:     Vital Signs (Most Recent):  Temp: 97.9 °F (36.6 °C) (07/29/22  0713)  Pulse: 71 (07/29/22 0713)  Resp: 20 (07/29/22 0713)  BP: 129/75 (07/29/22 0713)  SpO2: 97 % (07/29/22 0713) Vital Signs (24h Range):  Temp:  [97.9 °F (36.6 °C)] 97.9 °F (36.6 °C)  Pulse:  [71] 71  Resp:  [20] 20  SpO2:  [97 %] 97 %  BP: (129)/(75) 129/75     Weight: 93.4 kg (206 lb) (07/29/22 0713)  Body mass index is 32.26 kg/m².    No intake or output data in the 24 hours ending 07/29/22 0732    Lines/Drains/Airways     Peripheral Intravenous Line  Duration                Peripheral IV - Single Lumen 07/29/22 0712 22 G Anterior;Right Wrist <1 day                Physical Exam  Vitals reviewed.   Constitutional:       General: She is not in acute distress.     Appearance: Normal appearance. She is well-developed. She is not ill-appearing.   HENT:      Head: Normocephalic and atraumatic.      Nose: Nose normal.   Eyes:      Pupils: Pupils are equal, round, and reactive to light.   Cardiovascular:      Rate and Rhythm: Normal rate and regular rhythm.   Pulmonary:      Effort: Pulmonary effort is normal.      Breath sounds: Normal breath sounds. No wheezing.   Abdominal:      General: Abdomen is flat. Bowel sounds are normal. There is no distension.      Palpations: Abdomen is soft.      Tenderness: There is no abdominal tenderness. There is no guarding.   Skin:     General: Skin is warm and dry.      Coloration: Skin is not jaundiced.   Neurological:      Mental Status: She is alert.   Psychiatric:         Attention and Perception: Attention normal.         Mood and Affect: Affect normal.         Speech: Speech normal.         Behavior: Behavior is cooperative.      Comments: Pt was calm while speaking.         Significant Labs:  CBC: No results for input(s): WBC, HGB, HCT, PLT in the last 48 hours.  CMP: No results for input(s): GLU, CALCIUM, ALBUMIN, PROT, NA, K, CO2, CL, BUN, CREATININE, ALKPHOS, ALT, AST, BILITOT in the last 48 hours.    Significant Imaging:  Imaging results within the past 24 hours have  been reviewed.    Assessment/Plan:     There are no hospital problems to display for this patient.        Imp: esophageal dysphagia  Plan: egd with dilation    Jose A Correa MD  Gastroenterology  [facility] - Endoscopy

## 2022-07-29 NOTE — DISCHARGE INSTRUCTIONS
Procedure Date  7/29/22     Impression  Overall Impression: Mucosa of the esophagus is normal. The esophagus was dilated with a 48F Siddiqui. The stomach had normal mucosa with retained food present, consistent with gastroparesis. The pyloric channel was patent. Mucosa of the duodenum is normal.     Recommendation  Schedule repeat EGD with dilation prn; schedule gastric emptying study (delayed emptying) and return to GI clinic.   NO DRIVING, OPERATING EQUIPMENT, OR SIGNING LEGAL DOCUMENTS FOR 24 HOURS.

## 2022-07-29 NOTE — ANESTHESIA POSTPROCEDURE EVALUATION
Anesthesia Post Evaluation    Patient: Sheri Ho    Procedure(s) Performed: * No procedures listed *    Final Anesthesia Type: general      Patient location during evaluation: GI PACU  Patient participation: Yes- Able to Participate  Level of consciousness: awake and alert  Post-procedure vital signs: reviewed and stable  Pain management: adequate  Airway patency: patent    PONV status at discharge: No PONV  Anesthetic complications: no      Cardiovascular status: blood pressure returned to baseline and hemodynamically stable  Respiratory status: spontaneous ventilation  Hydration status: euvolemic  Follow-up not needed.  Comments: Pt voices appreciation for care          Vitals Value Taken Time   /73 07/29/22 0805   Temp 97 F 07/29/22 1018   Pulse 69 07/29/22 0806   Resp 21 07/29/22 0806   SpO2 97 % 07/29/22 0806   Vitals shown include unvalidated device data.      Event Time   Out of Recovery 08:22:56         Pain/Avila Score: Avila Score: 10 (7/29/2022  7:40 AM)

## 2022-08-11 ENCOUNTER — CLINICAL SUPPORT (OUTPATIENT)
Dept: REHABILITATION | Facility: HOSPITAL | Age: 57
End: 2022-08-11
Attending: ORTHOPAEDIC SURGERY
Payer: MEDICARE

## 2022-08-11 ENCOUNTER — HOSPITAL ENCOUNTER (OUTPATIENT)
Dept: RADIOLOGY | Facility: HOSPITAL | Age: 57
Discharge: HOME | End: 2022-08-11
Attending: INTERNAL MEDICINE
Payer: MEDICARE

## 2022-08-11 DIAGNOSIS — M47.812 CERVICAL SPONDYLOSIS WITHOUT MYELOPATHY: Primary | ICD-10-CM

## 2022-08-11 DIAGNOSIS — K31.84 GASTROPARESIS: ICD-10-CM

## 2022-08-11 PROCEDURE — 78264 NM GASTRIC EMPTYING: ICD-10-PCS | Mod: 26,,, | Performed by: RADIOLOGY

## 2022-08-11 PROCEDURE — 97110 THERAPEUTIC EXERCISES: CPT

## 2022-08-11 PROCEDURE — 97140 MANUAL THERAPY 1/> REGIONS: CPT

## 2022-08-11 PROCEDURE — 78264 GASTRIC EMPTYING IMG STUDY: CPT | Mod: 26,,, | Performed by: RADIOLOGY

## 2022-08-11 PROCEDURE — A9541 TC99M SULFUR COLLOID: HCPCS

## 2022-08-11 NOTE — PROGRESS NOTES
[facility]   Physical Therapy Treatment Note     Name: Sheri Ho  Clinic Number: [id number]    Therapy Diagnosis: neck pain, right shoulder pain  Physician: Cain Treviño MD    Visit Date: 8/11/2022    Physician: Slime Zambrano FNP     Physician Orders: PT Eval and Treat   Medical Diagnosis from Referral: see above  Evaluation Date: 7/11/2022  Authorization Period Expiration: 6/21/23  Plan of Care Expiration: 9/9/2022  Progress Note Due: 8/11/22  Visit # / Visits authorized: 4/17   FOTO: 34/49    PTA Visit #: 0/5     Time In: 1130  Time Out: 1215  Total Billable Time: 40 minutes    SUBJECTIVE     Pt reports :I have moderate pain; pt reported pain in right upper traps 3/10     She was compliant with home exercise program.  Response to previous treatment:   Functional change: none     Pain: 5/10  Location: bilateral neck, upper trap    OBJECTIVE     Objective Measures updated at progress report unless specified.   Right shoulder flexion 115, er 82, ir L3  Treatment     Sheri received the treatments listed below:      therapeutic exercises to develop strength, ROM and posture for 20 minutes including:  UBE 0 minutes  Pulleys 0 minutes  Corner stretch   Bilateral shoulder external rotation sidelying 2# x 30  Shoulder horizontal abduction   Cybex rows close                        Wide   Supine cane flexion stretch [id number]  Supine cane flexion 2# x 20  Standing right shld flexion yellow x 20  Standing scaption 2# x 20  Scap retract / green x 20    manual therapy techniques: Joint mobilizations and Myofacial release were applied to the: neck for 20 minutes, including:  Upper trap stretch  4 x 30 sh  Scalene stretch   Graston: #5, #4 to scalenes, upper traps, suboccipitals; #3 j-stroke to cervical and thoracic paraspinals.    supervised modalities after being cleared for contradictions: TENS:  Sheri received TENS electrical stimulation for pain to the neck. Pt received continuous mode  for N/A minutes. Sheri tolerated treatment well without any adverse effects.     Patient Education and Home Exercises     Home Exercises Provided and Patient Education Provided     Education provided:   - encouraged to continue HEP    Written Home Exercises Provided: Patient instructed to cont prior HEP. Exercises were reviewed and Sheri was able to demonstrate them prior to the end of the session.  Sheri demonstrated good  understanding of the education provided. 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  Patient is aware of mass, she said she would get in contact with Dr Connor Carballo to schedule an appt. For evaluation.  Patient received moderate relief of left-sided neck pain /p graston treatment.    Sheri Is progressing towards her goals.   Pt prognosis is Good.     Pt will continue to benefit from skilled outpatient physical therapy to address the deficits listed in the problem list box on initial evaluation, provide pt/family education and to maximize pt's level of independence in the home and community environment.     Pt's spiritual, cultural and educational needs considered and pt agreeable to plan of care and goals.     Anticipated barriers to physical therapy: none    Goals:   Short Term Goals: 4 weeks   1.  Improve right shoulder ACTIVE ROM flexion to >130, er >65, ir to L4  2.  Improve right shld strength to 4  3.  Reach in overhead shelf for 2# weight /s deviation  4.  Tolerate 45 minutes of exercise with <7/10 pain in neck and right shld.  Long Term Goals: 8 weeks   1.  Improve right shld ACTIVE ROM >140, ir to L2  2.  Improve right shld strength to 4+  3.  Reach in overhead shelf for 4# weight /s deviation  4.  Tolerate 5 minutes of position tolerance overhead with <5/10.      PLAN     Plan of care Certification: 7/11/2022 to 9/9/22.     Outpatient Physical Therapy 2 times weekly  for 8 weeks to include the following interventions: Cervical/Lumbar Traction, Electrical Stimulation 0-300hz, Iontophoresis (with 2.0 ml dexamethasone), Manual Therapy, Moist Heat/ Ice, Neuromuscular Re-ed, Patient Education, Self Care, Therapeutic Activities, Therapeutic Exercise and Ultrasound.       ROZINA STANFORD, PT

## 2022-08-15 ENCOUNTER — CLINICAL SUPPORT (OUTPATIENT)
Dept: REHABILITATION | Facility: HOSPITAL | Age: 57
End: 2022-08-15
Attending: ORTHOPAEDIC SURGERY
Payer: MEDICARE

## 2022-08-15 DIAGNOSIS — M47.812 CERVICAL SPONDYLOSIS WITHOUT MYELOPATHY: Primary | ICD-10-CM

## 2022-08-15 PROCEDURE — 97140 MANUAL THERAPY 1/> REGIONS: CPT

## 2022-08-15 PROCEDURE — 97110 THERAPEUTIC EXERCISES: CPT

## 2022-08-15 NOTE — PROGRESS NOTES
[facility]   Physical Therapy Treatment Note     Name: Sheri Ho  Clinic Number: [id number]    Therapy Diagnosis: neck pain, right shoulder pain  Physician: Cain Treviño MD    Visit Date: 8/15/2022    Physician: Slime Zambrano FNP     Physician Orders: PT Eval and Treat   Medical Diagnosis from Referral: see above  Evaluation Date: 7/11/2022  Authorization Period Expiration: 6/21/23  Plan of Care Expiration: 9/9/2022  Progress Note Due: 8/11/22  Visit # / Visits authorized: 4/17   FOTO: 34/49    PTA Visit #: 0/5     Time In: 1130  Time Out: 1215  Total Billable Time: 40 minutes    SUBJECTIVE     Pt reports :I have moderate pain; pt reported pain in right upper traps 3/10     She was compliant with home exercise program.  Response to previous treatment:   Functional change: none     Pain: 5/10  Location: bilateral neck, upper trap    OBJECTIVE     Objective Measures updated at progress report unless specified.   Right shoulder flexion 115, er 82, ir L3  Treatment     Sheri received the treatments listed below:      therapeutic exercises to develop strength, ROM and posture for 20 minutes including:  UBE 0 minutes  Pulleys 0 minutes  Corner stretch   Bilateral shoulder external rotation sidelying 2# x 30  Shoulder horizontal abduction   Cybex rows close                        Wide   Supine cane flexion stretch [id number]  Supine cane flexion 2# x 20  Standing right shld flexion yellow x 20  Standing scaption 2# x 20  Scap retract / green x 20    manual therapy techniques: Joint mobilizations and Myofacial release were applied to the: neck for 20 minutes, including:  Upper trap stretch  4 x 30 sh  Scalene stretch   Graston: #5, #4 to scalenes, upper traps, suboccipitals; #3 j-stroke to cervical and thoracic paraspinals.    supervised modalities after being cleared for contradictions: TENS:  Sheri received TENS electrical stimulation for pain to the neck. Pt received continuous mode  for N/A minutes. Sheri tolerated treatment well without any adverse effects.     Patient Education and Home Exercises     Home Exercises Provided and Patient Education Provided     Education provided:   - encouraged to continue HEP    Written Home Exercises Provided: Patient instructed to cont prior HEP. Exercises were reviewed and Sheri was able to demonstrate them prior to the end of the session.  Sheri demonstrated good  understanding of the education provided. 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  Patient is aware of mass, she said she would get in contact with Dr Connor Carballo to schedule an appt. For evaluation.  Patient received moderate relief of left-sided neck pain /p graston treatment.    Sheri Is progressing towards her goals.   Pt prognosis is Good.     Pt will continue to benefit from skilled outpatient physical therapy to address the deficits listed in the problem list box on initial evaluation, provide pt/family education and to maximize pt's level of independence in the home and community environment.     Pt's spiritual, cultural and educational needs considered and pt agreeable to plan of care and goals.     Anticipated barriers to physical therapy: none    Goals:   Short Term Goals: 4 weeks   1.  Improve right shoulder ACTIVE ROM flexion to >130, er >65, ir to L4  2.  Improve right shld strength to 4  3.  Reach in overhead shelf for 2# weight /s deviation  4.  Tolerate 45 minutes of exercise with <7/10 pain in neck and right shld.  Long Term Goals: 8 weeks   1.  Improve right shld ACTIVE ROM >140, ir to L2  2.  Improve right shld strength to 4+  3.  Reach in overhead shelf for 4# weight /s deviation  4.  Tolerate 5 minutes of position tolerance overhead with <5/10.      PLAN     Plan of care Certification: 7/11/2022 to 9/9/22.     Outpatient Physical Therapy 2 times weekly  for 8 weeks to include the following interventions: Cervical/Lumbar Traction, Electrical Stimulation 0-300hz, Iontophoresis (with 2.0 ml dexamethasone), Manual Therapy, Moist Heat/ Ice, Neuromuscular Re-ed, Patient Education, Self Care, Therapeutic Activities, Therapeutic Exercise and Ultrasound.       ROZINA STANFORD, PT

## 2022-08-16 ENCOUNTER — TELEPHONE (OUTPATIENT)
Dept: GASTROENTEROLOGY | Facility: CLINIC | Age: 57
End: 2022-08-16
Payer: MEDICARE

## 2022-08-16 NOTE — TELEPHONE ENCOUNTER
Called patient to discuss results and verbalized understanding.      ----- Message from Jose A Correa MD sent at 8/11/2022  5:51 PM CDT -----  Gastric emptying time is within normal limits.

## 2022-08-18 ENCOUNTER — CLINICAL SUPPORT (OUTPATIENT)
Dept: REHABILITATION | Facility: HOSPITAL | Age: 57
End: 2022-08-18
Attending: ORTHOPAEDIC SURGERY
Payer: MEDICARE

## 2022-08-18 DIAGNOSIS — Z98.890 S/P RIGHT ROTATOR CUFF REPAIR: ICD-10-CM

## 2022-08-18 DIAGNOSIS — M54.2 CERVICAL PAIN (NECK): ICD-10-CM

## 2022-08-18 PROCEDURE — 97140 MANUAL THERAPY 1/> REGIONS: CPT | Mod: CQ

## 2022-08-18 PROCEDURE — 97110 THERAPEUTIC EXERCISES: CPT | Mod: CQ

## 2022-08-18 NOTE — PROGRESS NOTES
[facility]   Physical Therapy Treatment Note     Name: Sheri Ho  Clinic Number: [id number]    Therapy Diagnosis: neck pain, right shoulder pain  Physician: Cain Treviño MD    Visit Date: 8/18/2022    Physician: Slime Zambrano FNP     Physician Orders: PT Eval and Treat   Medical Diagnosis from Referral: see above  Evaluation Date: 7/11/2022  Authorization Period Expiration: 6/21/23  Plan of Care Expiration: 9/9/2022  Progress Note Due: 8/11/22  Visit # / Visits authorized: 7/17   FOTO: 34/49    PTA Visit #: 1/5     Time In: 0858  Time Out: 0955  Total Billable Time: 57 minutes    SUBJECTIVE     Pt reports Last session really helped. Will see PTC next week. Hasn't made an appointment with Dr Carballo for consult yet. Couldn't sleep last night secondary to pain, took pain meds through the night.  She was compliant with home exercise program.  Response to previous treatment:   Functional change: none     Pain: 5/10  Location: bilateral neck, upper trap    OBJECTIVE     Objective Measures updated at progress report unless specified.   Right shoulder flexion 115, er 82, ir L3  Treatment     Sheri received the treatments listed below:      therapeutic exercises to develop strength, ROM and posture for 30 minutes including:  UBE 4 minutes  Pulleys 3 minutes  Corner stretch NOT THIS VISIT   Bilateral shoulder external rotation sidelying 2# x 30  Shoulder horizontal abduction   Cybex rows close #4 x30                       Wide #4 x30  Supine cane flexion stretch [id number] NOT THIS VISIT   Supine cane flexion 2# x 20 NOT THIS VISIT   Standing right shld flexion yellow x 20  Standing scaption 2# x 20  Scap retract / green x 20  Lat pulls #2 x20 with 3 sec hold   Wall slides flexion x 10 with 10 sec hold  SL scaption x 20    manual therapy techniques: Joint mobilizations and Myofacial release were applied to the: neck for 25 minutes, including:  Upper trap stretch  4 x 30 sh  Scalene  stretch, levator scapula stretch x 5 30 sec hold   MFR x 15 min  Manual traction x 5 min    supervised modalities after being cleared for contradictions: TENS:  Sheri received TENS electrical stimulation for pain to the neck. Pt received continuous mode for N/A minutes. Sheri tolerated treatment well without any adverse effects.     Patient Education and Home Exercises     Home Exercises Provided and Patient Education Provided     Education provided:   - encouraged to continue HEP    Written Home Exercises Provided: Patient instructed to cont prior HEP. Exercises were reviewed and Sheri was able to demonstrate them prior to the end of the session.  Sheri demonstrated good  understanding of the education provided. See EMR under Patient Instructions for exercises provided during therapy sessions    ASSESSMENT   Case conference with Sammy Collier PT for initial PTA visit. Pt with     Sheri Is progressing towards her goals.   Pt prognosis is Good.     Pt will continue to benefit from skilled outpatient physical therapy to address the deficits listed in the problem list box on initial evaluation, provide pt/family education and to maximize pt's level of independence in the home and community environment.     Pt's spiritual, cultural and educational needs considered and pt agreeable to plan of care and goals.     Anticipated barriers to physical therapy: none    Goals:   Short Term Goals: 4 weeks   1.  Improve right shoulder ACTIVE ROM flexion to >130, er >65, ir to L4  2.  Improve right shld strength to 4  3.  Reach in overhead shelf for 2# weight /s deviation  4.  Tolerate 45 minutes of exercise with <7/10 pain in neck and right shld.  Long Term Goals: 8 weeks   1.  Improve right shld ACTIVE ROM >140, ir to L2  2.  Improve right shld strength to 4+  3.  Reach in overhead shelf for 4# weight /s deviation  4.  Tolerate 5 minutes of position tolerance overhead with <5/10.      PLAN     Plan of care Certification: 7/11/2022 to  9/9/22.     Outpatient Physical Therapy 2 times weekly for 8 weeks to include the following interventions: Cervical/Lumbar Traction, Electrical Stimulation 0-300hz, Iontophoresis (with 2.0 ml dexamethasone), Manual Therapy, Moist Heat/ Ice, Neuromuscular Re-ed, Patient Education, Self Care, Therapeutic Activities, Therapeutic Exercise and Ultrasound.       Maegan Gillette, PTA

## 2022-08-23 ENCOUNTER — OFFICE VISIT (OUTPATIENT)
Dept: GASTROENTEROLOGY | Facility: CLINIC | Age: 57
End: 2022-08-23
Payer: MEDICARE

## 2022-08-23 ENCOUNTER — CLINICAL SUPPORT (OUTPATIENT)
Dept: REHABILITATION | Facility: HOSPITAL | Age: 57
End: 2022-08-23
Attending: ORTHOPAEDIC SURGERY
Payer: MEDICARE

## 2022-08-23 VITALS
BODY MASS INDEX: 32.93 KG/M2 | SYSTOLIC BLOOD PRESSURE: 123 MMHG | OXYGEN SATURATION: 100 % | WEIGHT: 209.81 LBS | HEIGHT: 67 IN | DIASTOLIC BLOOD PRESSURE: 68 MMHG | HEART RATE: 64 BPM

## 2022-08-23 DIAGNOSIS — K31.84 GASTROPARESIS: ICD-10-CM

## 2022-08-23 DIAGNOSIS — Z98.890 S/P RIGHT ROTATOR CUFF REPAIR: ICD-10-CM

## 2022-08-23 DIAGNOSIS — K21.9 GERD WITHOUT ESOPHAGITIS: Primary | ICD-10-CM

## 2022-08-23 DIAGNOSIS — R13.19 ESOPHAGEAL DYSPHAGIA: ICD-10-CM

## 2022-08-23 DIAGNOSIS — K59.00 CONSTIPATION, UNSPECIFIED CONSTIPATION TYPE: ICD-10-CM

## 2022-08-23 PROCEDURE — 1159F PR MEDICATION LIST DOCUMENTED IN MEDICAL RECORD: ICD-10-PCS | Mod: CPTII,,, | Performed by: NURSE PRACTITIONER

## 2022-08-23 PROCEDURE — 3044F HG A1C LEVEL LT 7.0%: CPT | Mod: CPTII,,, | Performed by: NURSE PRACTITIONER

## 2022-08-23 PROCEDURE — 1159F MED LIST DOCD IN RCRD: CPT | Mod: CPTII,,, | Performed by: NURSE PRACTITIONER

## 2022-08-23 PROCEDURE — 1160F PR REVIEW ALL MEDS BY PRESCRIBER/CLIN PHARMACIST DOCUMENTED: ICD-10-PCS | Mod: CPTII,,, | Performed by: NURSE PRACTITIONER

## 2022-08-23 PROCEDURE — 3074F PR MOST RECENT SYSTOLIC BLOOD PRESSURE < 130 MM HG: ICD-10-PCS | Mod: CPTII,,, | Performed by: NURSE PRACTITIONER

## 2022-08-23 PROCEDURE — 99214 PR OFFICE/OUTPT VISIT, EST, LEVL IV, 30-39 MIN: ICD-10-PCS | Mod: ,,, | Performed by: NURSE PRACTITIONER

## 2022-08-23 PROCEDURE — 99214 OFFICE O/P EST MOD 30 MIN: CPT | Mod: ,,, | Performed by: NURSE PRACTITIONER

## 2022-08-23 PROCEDURE — 3078F PR MOST RECENT DIASTOLIC BLOOD PRESSURE < 80 MM HG: ICD-10-PCS | Mod: CPTII,,, | Performed by: NURSE PRACTITIONER

## 2022-08-23 PROCEDURE — 1160F RVW MEDS BY RX/DR IN RCRD: CPT | Mod: CPTII,,, | Performed by: NURSE PRACTITIONER

## 2022-08-23 PROCEDURE — 3078F DIAST BP <80 MM HG: CPT | Mod: CPTII,,, | Performed by: NURSE PRACTITIONER

## 2022-08-23 PROCEDURE — 3008F BODY MASS INDEX DOCD: CPT | Mod: CPTII,,, | Performed by: NURSE PRACTITIONER

## 2022-08-23 PROCEDURE — 97140 MANUAL THERAPY 1/> REGIONS: CPT | Mod: CQ

## 2022-08-23 PROCEDURE — 3044F PR MOST RECENT HEMOGLOBIN A1C LEVEL <7.0%: ICD-10-PCS | Mod: CPTII,,, | Performed by: NURSE PRACTITIONER

## 2022-08-23 PROCEDURE — 3008F PR BODY MASS INDEX (BMI) DOCUMENTED: ICD-10-PCS | Mod: CPTII,,, | Performed by: NURSE PRACTITIONER

## 2022-08-23 PROCEDURE — 97110 THERAPEUTIC EXERCISES: CPT | Mod: CQ

## 2022-08-23 PROCEDURE — 3074F SYST BP LT 130 MM HG: CPT | Mod: CPTII,,, | Performed by: NURSE PRACTITIONER

## 2022-08-23 RX ORDER — TRAZODONE HYDROCHLORIDE 50 MG/1
50 TABLET ORAL NIGHTLY PRN
COMMUNITY
Start: 2022-08-12

## 2022-08-23 NOTE — PROGRESS NOTES
Sheri Ho is a 57 y.o. female here for No chief complaint on file.        PCP: Rajni Barton  Referring Provider: No referring provider defined for this encounter.     HPI:  Presents for follow up after EGD/Dilation. EGD dilation reviewed. Retained food consistent with gastroparesis. GES was ordered and is read as normal. Patient reports that she had not eaten since 9 pm the night before her EGD. Endorses early satiety and bloating.Reflux reported worse at night. Does have nausea, no vomiting. Continues to have occasional dysphagia with certain foods. Discussion with patient regarding small frequent meals and low residue diet.Advised that despite the normal gastric emptying study that diet may benefit her symptoms due to retained food on EGD. No hematochezia or melena. Last colonoscopy 7/7/16. Recommendation to repeat in 10 years. Liver fibrosis. Last liver elastography F 2-F 3.        ROS:  Review of Systems   Constitutional: Positive for appetite change (early satiety). Negative for fatigue, fever and unexpected weight change.   HENT: Positive for trouble swallowing.    Respiratory: Negative for shortness of breath.    Gastrointestinal: Positive for constipation, nausea and reflux. Negative for abdominal pain, blood in stool, change in bowel habit, diarrhea, vomiting and change in bowel habit.   Musculoskeletal: Negative for gait problem.   Integumentary:  Negative for pallor.   Neurological: Negative for dizziness.   Psychiatric/Behavioral: The patient is nervous/anxious.           PMHX:  has a past medical history of Arthritis, Esophageal dysphagia (4/29/2021), Gastroparesis (7/29/2022), GERD (gastroesophageal reflux disease), Mitral insufficiency, Panic attacks, and SOB (shortness of breath).    PSHX:  has a past surgical history that includes Cholecystectomy; Colonoscopy; Esophagogastroduodenoscopy; Hysterectomy; Shoulder arthroscopy; Cystoscopy; Rotator cuff repair; Shoulder arthroscopy  (Right, 11/30/2021); Arthroscopic repair of rotator cuff of shoulder (Right, 11/30/2021); and Oophorectomy.    PFHX: family history includes Breast cancer in her cousin; Diabetes in her father; Heart attack in her father; Heart disease in her father; Hypertension in her father, maternal grandmother, mother, and sister.    PSlHX:  reports that she has never smoked. She has never used smokeless tobacco. She reports that she does not drink alcohol and does not use drugs.        Review of patient's allergies indicates:   Allergen Reactions    Codeine Nausea And Vomiting and Swelling       Medication List with Changes/Refills   Current Medications    ALBUTEROL (PROVENTIL/VENTOLIN HFA) 90 MCG/ACTUATION INHALER    Inhale 2 puffs into the lungs every 4 (four) hours as needed for Wheezing.    ALBUTEROL SULFATE 90 MCG/ACTUATION AEBS    Inhale 180 mcg into the lungs every 6 (six) hours as needed.    AMLODIPINE (NORVASC) 5 MG TABLET    Take 0.5 tablets (2.5 mg total) by mouth once daily.    ASPIRIN (ECOTRIN) 81 MG EC TABLET    Take 81 mg by mouth once daily.    BREXPIPRAZOLE (REXULTI) 1 MG TAB    One a day for depression    BUDESONIDE-FORMOTEROL 160-4.5 MCG (SYMBICORT) 160-4.5 MCG/ACTUATION HFAA    Inhale 2 puffs into the lungs 2 (two) times a day.    BUPROPION (WELLBUTRIN SR) 100 MG TBSR 12 HR TABLET    Take 1 tablet (100 mg total) by mouth once daily.    CETIRIZINE (ZYRTEC) 10 MG TABLET    Take 1 tablet (10 mg total) by mouth once daily.    CYCLOBENZAPRINE (FLEXERIL) 10 MG TABLET    Take 10 mg by mouth 3 (three) times daily.    DICYCLOMINE (BENTYL) 20 MG TABLET        ESCITALOPRAM OXALATE (LEXAPRO) 20 MG TABLET    Take 1 tablet (20 mg total) by mouth once daily.    HYDRALAZINE (APRESOLINE) 10 MG TABLET    Take 1 tablet (10 mg total) by mouth 2 (two) times daily.    HYDROCHLOROTHIAZIDE (HYDRODIURIL) 12.5 MG TAB    TAKE 1 TABLET ONE TIME DAILY    HYDROCODONE-ACETAMINOPHEN (NORCO)  MG PER TABLET    Take 1 tablet by mouth  "every 8 (eight) hours as needed.    HYDROXYZINE HCL (ATARAX) 10 MG TAB    1-2 tabs Q6hrs prn for nerves, itching, insomnia, or nausea    IBUPROFEN (ADVIL,MOTRIN) 800 MG TABLET    Take 1 tablet (800 mg total) by mouth 2 (two) times a day.    LINACLOTIDE (LINZESS) 145 MCG CAP CAPSULE    Take 1 capsule (145 mcg total) by mouth before breakfast.    LOTEPREDNOL (LOTEMAX) 0.5 % OPHTHALMIC SUSPENSION    1 drop. As directed    MIRABEGRON ORAL    Take by mouth.    MULTIVITAMIN (MULTIPLE VITAMINS ORAL)    Take 1 tablet by mouth once daily. OTC: w/ Iron & Folic Acid    PANTOPRAZOLE (PROTONIX) 40 MG TABLET    Take 1 tablet (40 mg total) by mouth once daily.    PROPRANOLOL (INDERAL) 40 MG TABLET    Take 1 tablet (40 mg total) by mouth 2 (two) times daily.    TRAZODONE (DESYREL) 50 MG TABLET    Take 50 mg by mouth nightly as needed.        Objective Findings:  Vital Signs:  /68   Pulse 64   Ht 5' 7" (1.702 m)   Wt 95.2 kg (209 lb 12.8 oz)   SpO2 100%   BMI 32.86 kg/m²  Body mass index is 32.86 kg/m².    Physical Exam:  Physical Exam  Vitals and nursing note reviewed.   Constitutional:       General: She is not in acute distress.     Appearance: Normal appearance.   HENT:      Mouth/Throat:      Mouth: Mucous membranes are moist.   Cardiovascular:      Rate and Rhythm: Normal rate.   Pulmonary:      Breath sounds: No wheezing, rhonchi or rales.   Abdominal:      General: Bowel sounds are normal. There is no distension.      Palpations: Abdomen is soft. There is no mass.      Tenderness: There is no abdominal tenderness. There is no guarding or rebound.      Hernia: No hernia is present.   Musculoskeletal:      Right lower leg: No edema.      Left lower leg: No edema.   Skin:     General: Skin is warm and dry.      Coloration: Skin is not jaundiced or pale.   Neurological:      Mental Status: She is alert and oriented to person, place, and time.   Psychiatric:         Mood and Affect: Mood normal.          Labs:  Lab " Results   Component Value Date    WBC 7.10 04/01/2022    HGB 13.8 04/01/2022    HCT 42.3 04/01/2022    MCV 90.8 04/01/2022    RDW 13.2 04/01/2022     04/01/2022    LYMPH 41.1 (H) 04/01/2022    LYMPH 2.92 04/01/2022    MONO 8.6 (H) 04/01/2022    EOS 0.03 04/01/2022    BASO 0.05 04/01/2022     Lab Results   Component Value Date     04/01/2022    K 4.0 04/01/2022     04/01/2022    CO2 30 04/01/2022    GLU 97 04/01/2022    BUN 7 04/01/2022    CREATININE 0.86 04/01/2022    CALCIUM 8.9 04/01/2022    PROT 7.4 04/01/2022    ALBUMIN 3.7 04/01/2022    BILITOT 1.0 04/01/2022    ALKPHOS 103 04/01/2022    AST 15 04/01/2022    ALT 29 04/01/2022         Imaging: NM Gastric Emptying    Result Date: 8/11/2022  EXAMINATION: NM GASTRIC EMPTYING CLINICAL HISTORY: Gastroparesis COMPARISON: None TECHNIQUE: 500  µCi technetium 99 labeled sulfur colloid was given by mouth in solid food ( grits ). Time activity curves of the stomach with linear fit analysis was performed. FINDINGS: Linear fit half-life: 76 minutes. Raw data half-life 77 minutes. Twenty-three % empty at 35.5 minutes. Thirty-seven % empty at 62.5 minutes. Sixty-one % empty at 89.5 minutes.     Gastric emptying study within normal limits. Electronically signed by: Dayne Wolfe Date:    08/11/2022 Time:    11:14    EGD w Dilation    Result Date: 7/29/2022  Procedure Date 7/29/22 Impression Overall      Mucosa of the esophagus is normal. The esophagus was dilated with a 48F Siddiqui. The stomach had normal mucosa with retained food present, consistent with gastroparesis. The pyloric channel was patent. Mucosa of the duodenum is normal. Recommendation Schedule repeat EGD with dilation prn; schedule gastric emptying study (delayed emptying) and return to GI clinic. Indication Esophageal dysphagia Providers Attending: Jose A Correa MD Fellow:  Other Staff: Adriana Daly RN, Nancy Foy, Bhaskar Blackburn, CRNA Medications Moderate sedation  administered by anesthesia staff - See anesthesia record. Preprocedure A history and physical has been performed, and patient medication allergies have been reviewed. The patient's tolerance of previous anesthesia has been reviewed. The risks and benefits of the procedure and the sedation options and risks were discussed with the patient. All questions were answered and informed consent obtained. ASA Score: ASA 2 - Patient with mild systemic disease with no functional limitations Mallampati Airway Score: II (hard and soft palate, upper portion of tonsils anduvula visible) Details of the Procedure The patient underwent monitored anesthesia care, which was administered by an anesthesia professional. The patient's blood pressure, heart rate, level of consciousness, oxygen, respirations, ECG and ETCO2 were monitored throughout the procedure. The scope was introduced through the mouth and advanced to the third part of the duodenum. Retroflexion was performed in the cardia. Prior to the procedure, the patient's H. Pylori status was unknown. The patient's estimated blood loss was minimal (<5 mL). The procedure was not difficult. The patient tolerated the procedure well. There were no apparent complications. Scope: Gastroscope Scope Serial: [id number] Events Procedure Events Event Event Time Procedure Events Event Event Time SCOPE IN 7/29/2022  7:35 AM SCOPE OUT 7/29/2022  7:37 AM Findings None         Assessment:  Sheri Ho is a 57 y.o. female here with:  1. GERD without esophagitis    2. Gastroparesis    3. Esophageal dysphagia    4. Constipation, unspecified constipation type          Recommendations:  1. Do not lay down within 3 hours of eating  Eat 6-8 small meals per day  Avoid raw fruits and vegetables  Small amount of protein and fiber at one time. Written gastroparesis diet given to patient  Avoid red based foods  Avoid spicy, greasy foods  Avoid caffeine, citric acid, chocolate, peppermint, and carbonated  drinks  Increase fluid to 64 ounces daily  Avoid antiinflammatory medications such as motrin, advil, aleve, ibuprofen, and BC powder  2. Continue Protonix  3. Pepcid 20 mg at night  Follow up in about 3 months (around 11/23/2022).      Order summary:       Thank you for allowing me to participate in the care of Sheri Ho.      KOKO Gastelum

## 2022-08-23 NOTE — PATIENT INSTRUCTIONS
Do not lay down within 3 hours of eating  Eat 6-8 small meals per day  Avoid raw fruits and vegetables  Small amount of protein and fiber at one time  Avoid red based foods  Avoid spicy, greasy foods  Avoid caffeine, citric acid, chocolate, peppermint, and carbonated drinks  Increase fluid to 64 ounces daily  Avoid antiinflammatory medications such as motrin, advil, aleve, ibuprofen, and BC powder    Continue Protonix    May add Pepcid 20 mg at night

## 2022-08-23 NOTE — PROGRESS NOTES
[facility]   Physical Therapy Treatment Note     Name: Sheri Ho  Clinic Number: [id number]    Therapy Diagnosis: neck pain, right shoulder pain  Physician: Cain Treviño MD    Visit Date: 8/23/2022    Physician: Slime Zambrano FNP     Physician Orders: PT Eval and Treat   Medical Diagnosis from Referral: see above  Evaluation Date: 7/11/2022  Authorization Period Expiration: 6/21/23  Plan of Care Expiration: 9/9/2022  Progress Note Due: 8/11/22  Visit # / Visits authorized: 7/17   FOTO: 34/49    PTA Visit #: 1/5     Time In: 0906  Time Out: 1002  Total Billable Time: 54 minutes    SUBJECTIVE     Pt reports Area on back has become painful and interrupts sleep, uses a pillow to cushion it while lying supine  She was compliant with home exercise program.  Response to previous treatment:   Functional change: none     Pain: 6/10  Location: bilateral neck, upper trap    OBJECTIVE     Objective Measures updated at progress report unless specified.   Right shoulder flexion 115, er 82, ir L3  Treatment     Sheri received the treatments listed below:      therapeutic exercises to develop strength, ROM and posture for 30 minutes including:  UBE 4 minutes  Pulleys 0 minutes  Corner stretch NOT THIS VISIT   Bilateral shoulder external rotation blue x 30  Shoulder horizontal abduction blue x 20  Cybex rows close #4 x30                       Wide #4 x30  Supine cane flexion stretch [id number] NOT THIS VISIT   Supine cane flexion 2# x 20 NOT THIS VISIT   Standing right shld flexion yellow x 20  Standing scaption 2# x 20  Scap retract / green x 20  Lat pulls #2 x20 with 3 sec hold   Wall slides flexion x 10 with 10 sec hold  SL scaption x 20  scap retracts with chin tuck x 10 with 5 sec hold  manual therapy techniques: Joint mobilizations and Myofacial release were applied to the: neck for 25 minutes, including:  Upper trap stretch  4 x 30 sh  Scalene stretch, levator scapula stretch x 5 30 sec  hold   MFR x 15 min  Manual traction x 5 min    supervised modalities after being cleared for contradictions: TENS:  Sheri received TENS electrical stimulation for pain to the neck. Pt received continuous mode for N/A minutes. Sheri tolerated treatment well without any adverse effects.     Patient Education and Home Exercises     Home Exercises Provided and Patient Education Provided     Education provided:   - encouraged to let PTC Dr look at area on left side of thoracic spine that has become painful.    Written Home Exercises Provided: Patient instructed to cont prior HEP. Exercises were reviewed and Sheri was able to demonstrate them prior to the end of the session.  Sheri demonstrated good  understanding of the education provided. See EMR under Patient Instructions for exercises provided during therapy sessions    ASSESSMENT   Pt with c/o pain from muscle spasm in supraspinatus on right shoulder. Some tightness in upper flexion range.n    Sheri Is progressing towards her goals.   Pt prognosis is Good.     Pt will continue to benefit from skilled outpatient physical therapy to address the deficits listed in the problem list box on initial evaluation, provide pt/family education and to maximize pt's level of independence in the home and community environment.     Pt's spiritual, cultural and educational needs considered and pt agreeable to plan of care and goals.     Anticipated barriers to physical therapy: none    Goals:   Short Term Goals: 4 weeks   1.  Improve right shoulder ACTIVE ROM flexion to >130, er >65, ir to L4  2.  Improve right shld strength to 4  3.  Reach in overhead shelf for 2# weight /s deviation  4.  Tolerate 45 minutes of exercise with <7/10 pain in neck and right shld.  Long Term Goals: 8 weeks   1.  Improve right shld ACTIVE ROM >140, ir to L2  2.  Improve right shld strength to 4+  3.  Reach in overhead shelf for 4# weight /s deviation  4.  Tolerate 5 minutes of position tolerance overhead with  <5/10.      PLAN     Plan of care Certification: 7/11/2022 to 9/9/22.     Outpatient Physical Therapy 2 times weekly for 8 weeks to include the following interventions: Cervical/Lumbar Traction, Electrical Stimulation 0-300hz, Iontophoresis (with 2.0 ml dexamethasone), Manual Therapy, Moist Heat/ Ice, Neuromuscular Re-ed, Patient Education, Self Care, Therapeutic Activities, Therapeutic Exercise and Ultrasound.       Maegan Gillette, PTA

## 2022-08-25 ENCOUNTER — CLINICAL SUPPORT (OUTPATIENT)
Dept: REHABILITATION | Facility: HOSPITAL | Age: 57
End: 2022-08-25
Attending: ORTHOPAEDIC SURGERY
Payer: MEDICARE

## 2022-08-25 DIAGNOSIS — Z98.890 S/P RIGHT ROTATOR CUFF REPAIR: ICD-10-CM

## 2022-08-25 DIAGNOSIS — M54.2 CERVICAL PAIN (NECK): ICD-10-CM

## 2022-08-25 PROCEDURE — 97110 THERAPEUTIC EXERCISES: CPT | Mod: CQ

## 2022-08-25 PROCEDURE — 97014 ELECTRIC STIMULATION THERAPY: CPT | Mod: CQ

## 2022-08-25 PROCEDURE — 97010 HOT OR COLD PACKS THERAPY: CPT | Mod: CQ

## 2022-08-25 PROCEDURE — 97140 MANUAL THERAPY 1/> REGIONS: CPT | Mod: CQ

## 2022-08-25 NOTE — PROGRESS NOTES
[facility]   Physical Therapy Treatment Note     Name: Sheri Ho  Clinic Number: [id number]    Therapy Diagnosis: neck pain, right shoulder pain  Physician: Cain Treviño MD    Visit Date: 8/25/2022    Physician: Slime Zambrano FNP     Physician Orders: PT Eval and Treat   Medical Diagnosis from Referral: see above  Evaluation Date: 7/11/2022  Authorization Period Expiration: 6/21/23  Plan of Care Expiration: 9/9/2022  Progress Note Due: 8/11/22  Visit # / Visits authorized: 7/17   FOTO: 34/49    PTA Visit #: 1/5     Time In: 0906  Time Out: 1002  Total Billable Time: 54 minutes    SUBJECTIVE     Pt reports Er Juan Manuel is gonna make a referral to Dr Carballo regarding spot on back, ordered an MRI of the neck Sept 2. Nerves are really bad this morning, very jittery/ hands trembling  She was compliant with home exercise program.  Response to previous treatment:   Functional change: none     Pain: 6/10  Location: bilateral neck, upper trap, right shoulder, area of left side back    OBJECTIVE     Objective Measures updated at progress report unless specified.   Right shoulder flexion 115, er 82, ir L3  Treatment     Sheri received the treatments listed below:      therapeutic exercises to develop strength, ROM and posture for 10 minutes including:  UBE 5 minutes  scap retracts with chin tuck x 10 with 5 sec hold    NOT THIS VISIT:   Pulleys 0 minutes  Corner stretch NOT THIS VISIT   Bilateral shoulder external rotation blue x 30  Shoulder horizontal abduction blue x 20  Cybex rows close #4 x30                       Wide #4 x30  Supine cane flexion stretch [id number] NOT THIS VISIT   Supine cane flexion 2# x 20 NOT THIS VISIT   Standing right shld flexion yellow x 20  Standing scaption 2# x 20  Scap retract / green x 20  Lat pulls #2 x20 with 3 sec hold   Wall slides flexion x 10 with 10 sec hold  SL scaption x 20    manual therapy techniques: Joint mobilizations and Myofacial release were  applied to the: neck for 15 minutes, including:  Upper trap stretch  4 x 30 sh  Scalene stretch, levator scapula stretch x 5 30 sec hold   MFR x 10 min  Manual traction x 0 min    supervised modalities after being cleared for contradictions: TENS:  Sheri received TENS electrical stimulation for pain to the neck and right shoulder. Pt received continuous mode for 15minutes. Sheri tolerated treatment well without any adverse effects. With MHP to right side neck and shoulder    Patient Education and Home Exercises     Home Exercises Provided and Patient Education Provided     Education provided:   - encouraged to let PTC Dr look at area on left side of thoracic spine that has become painful.    Written Home Exercises Provided: Patient instructed to cont prior HEP. Exercises were reviewed and Sheri was able to demonstrate them prior to the end of the session.  Sheri demonstrated good  understanding of the education provided. See EMR under Patient Instructions for exercises provided during therapy sessions    ASSESSMENT   Pt treated with mostly pallative care due to emotional distress today. States relief from pain in right shoulder with treatment today.    Sheri Is progressing towards her goals.   Pt prognosis is Good.     Pt will continue to benefit from skilled outpatient physical therapy to address the deficits listed in the problem list box on initial evaluation, provide pt/family education and to maximize pt's level of independence in the home and community environment.     Pt's spiritual, cultural and educational needs considered and pt agreeable to plan of care and goals.     Anticipated barriers to physical therapy: none    Goals:   Short Term Goals: 4 weeks   1.  Improve right shoulder ACTIVE ROM flexion to >130, er >65, ir to L4  2.  Improve right shld strength to 4  3.  Reach in overhead shelf for 2# weight /s deviation  4.  Tolerate 45 minutes of exercise with <7/10 pain in neck and right shld.  Long Term Goals:  8 weeks   1.  Improve right shld ACTIVE ROM >140, ir to L2  2.  Improve right shld strength to 4+  3.  Reach in overhead shelf for 4# weight /s deviation  4.  Tolerate 5 minutes of position tolerance overhead with <5/10.      PLAN     Plan of care Certification: 7/11/2022 to 9/9/22.     Outpatient Physical Therapy 2 times weekly for 8 weeks to include the following interventions: Cervical/Lumbar Traction, Electrical Stimulation 0-300hz, Iontophoresis (with 2.0 ml dexamethasone), Manual Therapy, Moist Heat/ Ice, Neuromuscular Re-ed, Patient Education, Self Care, Therapeutic Activities, Therapeutic Exercise and Ultrasound.       FAY Marti

## 2022-08-29 ENCOUNTER — CLINICAL SUPPORT (OUTPATIENT)
Dept: REHABILITATION | Facility: HOSPITAL | Age: 57
End: 2022-08-29
Attending: ORTHOPAEDIC SURGERY
Payer: MEDICARE

## 2022-08-29 DIAGNOSIS — M54.2 CERVICAL PAIN (NECK): Primary | ICD-10-CM

## 2022-08-29 PROCEDURE — 97110 THERAPEUTIC EXERCISES: CPT

## 2022-08-29 PROCEDURE — 97112 NEUROMUSCULAR REEDUCATION: CPT

## 2022-08-29 PROCEDURE — 97140 MANUAL THERAPY 1/> REGIONS: CPT

## 2022-08-29 NOTE — PROGRESS NOTES
[facility]   Physical Therapy Treatment Note     Name: Sheri Ho  Clinic Number: [id number]    Therapy Diagnosis: neck pain, right shoulder pain  Physician: Cain Treviño MD    Visit Date: 8/29/2022     Physician Orders: PT Eval and Treat   Medical Diagnosis from Referral: see above  Evaluation Date: 7/11/2022  Authorization Period Expiration: 6/21/23  Plan of Care Expiration: 9/9/2022  Progress Note Due: 9/15/22  Visit # / Visits authorized: 10/17   FOTO: 34/49    PTA Visit #: 0/5     Time In:1305  Time Out:1345  Total Billable Time: 40 minutes    SUBJECTIVE     Pt reports a referral has been made for assessment of lipoma-looking mass on T-spine  She was compliant with home exercise program.  Response to previous treatment: no complaint  Functional change: none     Pain: 6/10  Location: bilateral neck, upper trap, right shoulder, area of left side back    OBJECTIVE     Objective Measures updated at progress report unless specified.   Right shoulder flexion 115, er 82, ir L3  Treatment     Sheri received the treatments listed below:      therapeutic exercises to develop strength, ROM and posture for 10 minutes including:  UBE   scap retract with chin tuck x 10 with 5 sec hold  Supine cane flexion stretch [id number]   Upper trap stretch 4x30sh  Elbow flexion yellow x 30 (right)  Elbow extension yellow x 30 (right)    NEUROMUSCULAR REEDUCATION 15 minutes  SL scaption x 20  Scap PNF x 30  Supine er yellow x 30 right  Supine ir yellow x 30 right    manual therapy techniques: Joint mobilizations and Myofacial release were applied to the: neck for 15 minutes, including:    [place] #5, #4, #3 to suboccipitals, upper traps, thoracic paraspinals  Manual traction x 0 min    Patient Education and Home Exercises     Home Exercises Provided and Patient Education Provided     Education provided:     Written Home Exercises Provided: Patient instructed to cont prior HEP. Exercises were reviewed and  Sheri was able to demonstrate them prior to the end of the session.  Sheri demonstrated good  understanding of the education provided. See EMR under Patient Instructions for exercises provided during therapy sessions    ASSESSMENT   Pt tolerated exercise today /x passive shoulder flexion caused mild pain.  She did say her nerves were bothering her.    Sheri Is progressing towards her goals.   Pt prognosis is Good.     Pt will continue to benefit from skilled outpatient physical therapy to address the deficits listed in the problem list box on initial evaluation, provide pt/family education and to maximize pt's level of independence in the home and community environment.     Pt's spiritual, cultural and educational needs considered and pt agreeable to plan of care and goals.     Anticipated barriers to physical therapy: none    Goals:   Short Term Goals: 4 weeks   1.  Improve right shoulder ACTIVE ROM flexion to >130, er >65, ir to L4  2.  Improve right shld strength to 4  3.  Reach in overhead shelf for 2# weight /s deviation  4.  Tolerate 45 minutes of exercise with <7/10 pain in neck and right shld.  Long Term Goals: 8 weeks   1.  Improve right shld ACTIVE ROM >140, ir to L2  2.  Improve right shld strength to 4+  3.  Reach in overhead shelf for 4# weight /s deviation  4.  Tolerate 5 minutes of position tolerance overhead with <5/10.      PLAN     Plan of care Certification: 7/11/2022 to 9/9/22.     Outpatient Physical Therapy 2 times weekly for 8 weeks to include the following interventions: Cervical/Lumbar Traction, Electrical Stimulation 0-300hz, Iontophoresis (with 2.0 ml dexamethasone), Manual Therapy, Moist Heat/ Ice, Neuromuscular Re-ed, Patient Education, Self Care, Therapeutic Activities, Therapeutic Exercise and Ultrasound.       Nila Collier, MSPT  Physical Therapist

## 2022-08-30 ENCOUNTER — CLINICAL SUPPORT (OUTPATIENT)
Dept: CARDIOLOGY | Facility: CLINIC | Age: 57
End: 2022-08-30
Payer: MEDICARE

## 2022-08-30 ENCOUNTER — OFFICE VISIT (OUTPATIENT)
Dept: SURGERY | Facility: CLINIC | Age: 57
End: 2022-08-30
Payer: MEDICARE

## 2022-08-30 DIAGNOSIS — D17.1 LIPOMA OF BACK: ICD-10-CM

## 2022-08-30 DIAGNOSIS — D17.1 LIPOMA OF BACK: Primary | ICD-10-CM

## 2022-08-30 PROCEDURE — 99204 OFFICE O/P NEW MOD 45 MIN: CPT | Mod: S$PBB,,, | Performed by: STUDENT IN AN ORGANIZED HEALTH CARE EDUCATION/TRAINING PROGRAM

## 2022-08-30 PROCEDURE — 99212 OFFICE O/P EST SF 10 MIN: CPT | Mod: PBBFAC

## 2022-08-30 PROCEDURE — 1159F MED LIST DOCD IN RCRD: CPT | Mod: CPTII,,, | Performed by: STUDENT IN AN ORGANIZED HEALTH CARE EDUCATION/TRAINING PROGRAM

## 2022-08-30 PROCEDURE — 99215 OFFICE O/P EST HI 40 MIN: CPT | Mod: PBBFAC | Performed by: STUDENT IN AN ORGANIZED HEALTH CARE EDUCATION/TRAINING PROGRAM

## 2022-08-30 PROCEDURE — 3044F PR MOST RECENT HEMOGLOBIN A1C LEVEL <7.0%: ICD-10-PCS | Mod: CPTII,,, | Performed by: STUDENT IN AN ORGANIZED HEALTH CARE EDUCATION/TRAINING PROGRAM

## 2022-08-30 PROCEDURE — 93010 ELECTROCARDIOGRAM REPORT: CPT | Mod: S$PBB,,, | Performed by: INTERNAL MEDICINE

## 2022-08-30 PROCEDURE — 3044F HG A1C LEVEL LT 7.0%: CPT | Mod: CPTII,,, | Performed by: STUDENT IN AN ORGANIZED HEALTH CARE EDUCATION/TRAINING PROGRAM

## 2022-08-30 PROCEDURE — 1159F PR MEDICATION LIST DOCUMENTED IN MEDICAL RECORD: ICD-10-PCS | Mod: CPTII,,, | Performed by: STUDENT IN AN ORGANIZED HEALTH CARE EDUCATION/TRAINING PROGRAM

## 2022-08-30 PROCEDURE — 93005 ELECTROCARDIOGRAM TRACING: CPT | Mod: PBBFAC | Performed by: INTERNAL MEDICINE

## 2022-08-30 PROCEDURE — 99204 PR OFFICE/OUTPT VISIT, NEW, LEVL IV, 45-59 MIN: ICD-10-PCS | Mod: S$PBB,,, | Performed by: STUDENT IN AN ORGANIZED HEALTH CARE EDUCATION/TRAINING PROGRAM

## 2022-08-30 PROCEDURE — 93010 EKG 12-LEAD: ICD-10-PCS | Mod: S$PBB,,, | Performed by: INTERNAL MEDICINE

## 2022-08-30 RX ORDER — SODIUM CHLORIDE 9 MG/ML
INJECTION, SOLUTION INTRAVENOUS CONTINUOUS
Status: CANCELLED | OUTPATIENT
Start: 2022-08-30

## 2022-08-30 NOTE — PROGRESS NOTES
History & Physical    SUBJECTIVE:     History of Present Illness:  57-year-old  female presents to the clinic for evaluation for back mass.  Patient states her family noticed a lump in the middle of her back over a year ago, but due to her having shoulder problems and requiring rotator cuff surgery, she is not really paid attention to it.  She started having increased pain in the middle of her back with radiation to her left arm and now that she has paid attention to it, she states the mass is getting larger.  She would like this excised.  Denies any chest pain/shortness of breath, nausea/vomiting/diarrhea, fever/chills.  She states that she is had intermittent shortness of breath since her mother passed away and states this is mostly due to anxiety.  She does take Symbicort and albuterol inhalers and states this has drastically helped.  The patient states whenever she goes to [employer] she can walk long distances and not have any shortness of breath; she does not have to stop and sit down to catch her breath.    Chief Complaint   Patient presents with    Lump     Lipoma right upper back       Review of patient's allergies indicates:   Allergen Reactions    Codeine Nausea And Vomiting and Swelling       Current Outpatient Medications   Medication Sig Dispense Refill    albuterol (PROVENTIL/VENTOLIN HFA) 90 mcg/actuation inhaler Inhale 2 puffs into the lungs every 4 (four) hours as needed for Wheezing. 54 g 3    albuterol sulfate 90 mcg/actuation aebs Inhale 180 mcg into the lungs every 6 (six) hours as needed.      amLODIPine (NORVASC) 5 MG tablet Take 0.5 tablets (2.5 mg total) by mouth once daily. 90 tablet 3    aspirin (ECOTRIN) 81 MG EC tablet Take 81 mg by mouth once daily.      brexpiprazole (REXULTI) 1 mg Tab One a day for depression 90 tablet 3    budesonide-formoterol 160-4.5 mcg (SYMBICORT) 160-4.5 mcg/actuation HFAA Inhale 2 puffs into the lungs 2 (two) times a day. 30.6 g 3     buPROPion (WELLBUTRIN SR) 100 MG TBSR 12 hr tablet Take 1 tablet (100 mg total) by mouth once daily. 90 tablet 3    cetirizine (ZYRTEC) 10 MG tablet Take 1 tablet (10 mg total) by mouth once daily. 90 tablet 3    cyclobenzaprine (FLEXERIL) 10 MG tablet Take 10 mg by mouth 3 (three) times daily.      dicyclomine (BENTYL) 20 mg tablet       EScitalopram oxalate (LEXAPRO) 20 MG tablet Take 1 tablet (20 mg total) by mouth once daily. 90 tablet 3    hydrALAZINE (APRESOLINE) 10 MG tablet Take 1 tablet (10 mg total) by mouth 2 (two) times daily. 180 tablet 3    hydroCHLOROthiazide (HYDRODIURIL) 12.5 MG Tab TAKE 1 TABLET ONE TIME DAILY 90 tablet 0    HYDROcodone-acetaminophen (NORCO)  mg per tablet Take 1 tablet by mouth every 8 (eight) hours as needed.      hydrOXYzine HCL (ATARAX) 10 MG Tab 1-2 tabs Q6hrs prn for nerves, itching, insomnia, or nausea 120 tablet 5    ibuprofen (ADVIL,MOTRIN) 800 MG tablet Take 1 tablet (800 mg total) by mouth 2 (two) times a day. 180 tablet 1    linaCLOtide (LINZESS) 145 mcg Cap capsule Take 1 capsule (145 mcg total) by mouth before breakfast. 90 capsule 3    loteprednol (LOTEMAX) 0.5 % ophthalmic suspension 1 drop. As directed      MIRABEGRON ORAL Take by mouth.      multivitamin (MULTIPLE VITAMINS ORAL) Take 1 tablet by mouth once daily. OTC: w/ Iron & Folic Acid      pantoprazole (PROTONIX) 40 MG tablet Take 1 tablet (40 mg total) by mouth once daily. 90 tablet 3    propranoloL (INDERAL) 40 MG tablet Take 1 tablet (40 mg total) by mouth 2 (two) times daily. 180 tablet 3    traZODone (DESYREL) 50 MG tablet Take 50 mg by mouth nightly as needed.       No current facility-administered medications for this visit.     Facility-Administered Medications Ordered in Other Visits   Medication Dose Route Frequency Provider Last Rate Last Admin    0.9%  NaCl infusion   Intravenous Continuous Cain Treviño  mL/hr at 11/30/21 1144 New Bag at 11/30/21 1144       Past  Medical History:   Diagnosis Date    Arthritis     Esophageal dysphagia 4/29/2021    Gastroparesis 7/29/2022    GERD (gastroesophageal reflux disease)     Mitral insufficiency     Panic attacks     SOB (shortness of breath)      Past Surgical History:   Procedure Laterality Date    ARTHROSCOPIC REPAIR OF ROTATOR CUFF OF SHOULDER Right 11/30/2021    Procedure: REPAIR, ROTATOR CUFF, ARTHROSCOPIC;  Surgeon: Cain Treviño MD;  Location: [facility];  Service: Orthopedics;  Laterality: Right;    CHOLECYSTECTOMY      COLONOSCOPY      CYSTOSCOPY      ESOPHAGOGASTRODUODENOSCOPY      HYSTERECTOMY      OOPHORECTOMY      ROTATOR CUFF REPAIR      SHOULDER ARTHROSCOPY      SHOULDER ARTHROSCOPY Right 11/30/2021    Procedure: ARTHROSCOPY, SHOULDER;  Surgeon: Cain Treviño MD;  Location: [facility];  Service: Orthopedics;  Laterality: Right;     Family History   Problem Relation Age of Onset    Hypertension Mother     Diabetes Father     Hypertension Father     Heart disease Father     Heart attack Father     Hypertension Sister     Hypertension Maternal Grandmother     Breast cancer Cousin      Social History     Tobacco Use    Smoking status: Never    Smokeless tobacco: Never   Substance Use Topics    Alcohol use: Never    Drug use: Never        Review of Systems:  Review of Systems   Constitutional: Negative.  Negative for fatigue and unexpected weight change.   HENT: Negative.  Negative for trouble swallowing.    Eyes: Negative.    Respiratory: Negative.  Negative for chest tightness and shortness of breath.    Cardiovascular: Negative.  Negative for chest pain.   Gastrointestinal: Negative.  Negative for abdominal pain, blood in stool and nausea.   Endocrine: Negative.    Genitourinary: Negative.  Negative for hematuria.   Musculoskeletal: Negative.  Negative for back pain and myalgias.   Neurological: Negative.  Negative for dizziness, speech difficulty, weakness and  light-headedness.   Psychiatric/Behavioral: Negative.  Negative for agitation and behavioral problems.      OBJECTIVE:     Vital Signs (Most Recent)              Physical Exam:  Physical Exam  Constitutional:       General: She is not in acute distress.     Appearance: Normal appearance.   HENT:      Head: Normocephalic and atraumatic.      Nose: Nose normal.   Eyes:      General: No scleral icterus.     Pupils: Pupils are equal, round, and reactive to light.   Cardiovascular:      Rate and Rhythm: Normal rate.   Pulmonary:      Effort: Pulmonary effort is normal. No respiratory distress.      Breath sounds: Normal breath sounds.   Abdominal:      General: There is no distension.      Palpations: Abdomen is soft.      Tenderness: There is no abdominal tenderness. There is no guarding.   Musculoskeletal:         General: Normal range of motion.      Cervical back: Normal range of motion and neck supple.        Back:       Comments: 4 x 4 cm fatty mass, mild tenderness to palpation   Skin:     General: Skin is warm.      Coloration: Skin is not jaundiced.   Neurological:      General: No focal deficit present.      Mental Status: She is alert and oriented to person, place, and time.      Cranial Nerves: No cranial nerve deficit.   Psychiatric:         Mood and Affect: Mood normal.       ASSESSMENT/PLAN:     Back fatty mass, suspect lipoma    PLAN:Plan     Recommend excision of a back fatty tumor.      Risks and benefits explained with the patient including risks for infection, bleeding, injury to surrounding structures, hematoma/seroma formation with need for possible evacuation, possible open.  The patient verbalized understanding, agrees and wishes to proceed with surgery.    Patient up-to-date with COVID; hysterectomy in 2000; we will get a CBC, BMP and EKG

## 2022-08-30 NOTE — H&P (VIEW-ONLY)
History & Physical    SUBJECTIVE:     History of Present Illness:  57-year-old  female presents to the clinic for evaluation for back mass.  Patient states her family noticed a lump in the middle of her back over a year ago, but due to her having shoulder problems and requiring rotator cuff surgery, she is not really paid attention to it.  She started having increased pain in the middle of her back with radiation to her left arm and now that she has paid attention to it, she states the mass is getting larger.  She would like this excised.  Denies any chest pain/shortness of breath, nausea/vomiting/diarrhea, fever/chills.  She states that she is had intermittent shortness of breath since her mother passed away and states this is mostly due to anxiety.  She does take Symbicort and albuterol inhalers and states this has drastically helped.  The patient states whenever she goes to [employer] she can walk long distances and not have any shortness of breath; she does not have to stop and sit down to catch her breath.    Chief Complaint   Patient presents with    Lump     Lipoma right upper back       Review of patient's allergies indicates:   Allergen Reactions    Codeine Nausea And Vomiting and Swelling       Current Outpatient Medications   Medication Sig Dispense Refill    albuterol (PROVENTIL/VENTOLIN HFA) 90 mcg/actuation inhaler Inhale 2 puffs into the lungs every 4 (four) hours as needed for Wheezing. 54 g 3    albuterol sulfate 90 mcg/actuation aebs Inhale 180 mcg into the lungs every 6 (six) hours as needed.      amLODIPine (NORVASC) 5 MG tablet Take 0.5 tablets (2.5 mg total) by mouth once daily. 90 tablet 3    aspirin (ECOTRIN) 81 MG EC tablet Take 81 mg by mouth once daily.      brexpiprazole (REXULTI) 1 mg Tab One a day for depression 90 tablet 3    budesonide-formoterol 160-4.5 mcg (SYMBICORT) 160-4.5 mcg/actuation HFAA Inhale 2 puffs into the lungs 2 (two) times a day. 30.6 g 3     buPROPion (WELLBUTRIN SR) 100 MG TBSR 12 hr tablet Take 1 tablet (100 mg total) by mouth once daily. 90 tablet 3    cetirizine (ZYRTEC) 10 MG tablet Take 1 tablet (10 mg total) by mouth once daily. 90 tablet 3    cyclobenzaprine (FLEXERIL) 10 MG tablet Take 10 mg by mouth 3 (three) times daily.      dicyclomine (BENTYL) 20 mg tablet       EScitalopram oxalate (LEXAPRO) 20 MG tablet Take 1 tablet (20 mg total) by mouth once daily. 90 tablet 3    hydrALAZINE (APRESOLINE) 10 MG tablet Take 1 tablet (10 mg total) by mouth 2 (two) times daily. 180 tablet 3    hydroCHLOROthiazide (HYDRODIURIL) 12.5 MG Tab TAKE 1 TABLET ONE TIME DAILY 90 tablet 0    HYDROcodone-acetaminophen (NORCO)  mg per tablet Take 1 tablet by mouth every 8 (eight) hours as needed.      hydrOXYzine HCL (ATARAX) 10 MG Tab 1-2 tabs Q6hrs prn for nerves, itching, insomnia, or nausea 120 tablet 5    ibuprofen (ADVIL,MOTRIN) 800 MG tablet Take 1 tablet (800 mg total) by mouth 2 (two) times a day. 180 tablet 1    linaCLOtide (LINZESS) 145 mcg Cap capsule Take 1 capsule (145 mcg total) by mouth before breakfast. 90 capsule 3    loteprednol (LOTEMAX) 0.5 % ophthalmic suspension 1 drop. As directed      MIRABEGRON ORAL Take by mouth.      multivitamin (MULTIPLE VITAMINS ORAL) Take 1 tablet by mouth once daily. OTC: w/ Iron & Folic Acid      pantoprazole (PROTONIX) 40 MG tablet Take 1 tablet (40 mg total) by mouth once daily. 90 tablet 3    propranoloL (INDERAL) 40 MG tablet Take 1 tablet (40 mg total) by mouth 2 (two) times daily. 180 tablet 3    traZODone (DESYREL) 50 MG tablet Take 50 mg by mouth nightly as needed.       No current facility-administered medications for this visit.     Facility-Administered Medications Ordered in Other Visits   Medication Dose Route Frequency Provider Last Rate Last Admin    0.9%  NaCl infusion   Intravenous Continuous Cain Treviño  mL/hr at 11/30/21 1144 New Bag at 11/30/21 1144       Past  Medical History:   Diagnosis Date    Arthritis     Esophageal dysphagia 4/29/2021    Gastroparesis 7/29/2022    GERD (gastroesophageal reflux disease)     Mitral insufficiency     Panic attacks     SOB (shortness of breath)      Past Surgical History:   Procedure Laterality Date    ARTHROSCOPIC REPAIR OF ROTATOR CUFF OF SHOULDER Right 11/30/2021    Procedure: REPAIR, ROTATOR CUFF, ARTHROSCOPIC;  Surgeon: Cain Treviño MD;  Location: [facility];  Service: Orthopedics;  Laterality: Right;    CHOLECYSTECTOMY      COLONOSCOPY      CYSTOSCOPY      ESOPHAGOGASTRODUODENOSCOPY      HYSTERECTOMY      OOPHORECTOMY      ROTATOR CUFF REPAIR      SHOULDER ARTHROSCOPY      SHOULDER ARTHROSCOPY Right 11/30/2021    Procedure: ARTHROSCOPY, SHOULDER;  Surgeon: Cain Treviño MD;  Location: [facility];  Service: Orthopedics;  Laterality: Right;     Family History   Problem Relation Age of Onset    Hypertension Mother     Diabetes Father     Hypertension Father     Heart disease Father     Heart attack Father     Hypertension Sister     Hypertension Maternal Grandmother     Breast cancer Cousin      Social History     Tobacco Use    Smoking status: Never    Smokeless tobacco: Never   Substance Use Topics    Alcohol use: Never    Drug use: Never        Review of Systems:  Review of Systems   Constitutional: Negative.  Negative for fatigue and unexpected weight change.   HENT: Negative.  Negative for trouble swallowing.    Eyes: Negative.    Respiratory: Negative.  Negative for chest tightness and shortness of breath.    Cardiovascular: Negative.  Negative for chest pain.   Gastrointestinal: Negative.  Negative for abdominal pain, blood in stool and nausea.   Endocrine: Negative.    Genitourinary: Negative.  Negative for hematuria.   Musculoskeletal: Negative.  Negative for back pain and myalgias.   Neurological: Negative.  Negative for dizziness, speech difficulty, weakness and  light-headedness.   Psychiatric/Behavioral: Negative.  Negative for agitation and behavioral problems.      OBJECTIVE:     Vital Signs (Most Recent)              Physical Exam:  Physical Exam  Constitutional:       General: She is not in acute distress.     Appearance: Normal appearance.   HENT:      Head: Normocephalic and atraumatic.      Nose: Nose normal.   Eyes:      General: No scleral icterus.     Pupils: Pupils are equal, round, and reactive to light.   Cardiovascular:      Rate and Rhythm: Normal rate.   Pulmonary:      Effort: Pulmonary effort is normal. No respiratory distress.      Breath sounds: Normal breath sounds.   Abdominal:      General: There is no distension.      Palpations: Abdomen is soft.      Tenderness: There is no abdominal tenderness. There is no guarding.   Musculoskeletal:         General: Normal range of motion.      Cervical back: Normal range of motion and neck supple.        Back:       Comments: 4 x 4 cm fatty mass, mild tenderness to palpation   Skin:     General: Skin is warm.      Coloration: Skin is not jaundiced.   Neurological:      General: No focal deficit present.      Mental Status: She is alert and oriented to person, place, and time.      Cranial Nerves: No cranial nerve deficit.   Psychiatric:         Mood and Affect: Mood normal.       ASSESSMENT/PLAN:     Back fatty mass, suspect lipoma    PLAN:Plan     Recommend excision of a back fatty tumor.      Risks and benefits explained with the patient including risks for infection, bleeding, injury to surrounding structures, hematoma/seroma formation with need for possible evacuation, possible open.  The patient verbalized understanding, agrees and wishes to proceed with surgery.    Patient up-to-date with COVID; hysterectomy in 2000; we will get a CBC, BMP and EKG

## 2022-08-30 NOTE — PATIENT INSTRUCTIONS
[facility]                                                                                                                                                                                                                                                                                                                                                                                                                                                                         Preoperative Instructions        Your pre-op lab work will be on 8/30/2022 on the 1st floor of the [facility] building.  EKG is located on 2nd floor of [facility].                                                                                   Day of Surgery      Your surgery is scheduled for 9/2/2022 at [facility] on the ground floor of the Ambulatory building.     Your arrival time is 0600 AM.              DO NOT EAT OR DRINK ANYTHING AFTER MIDNIGHT.          You may take blood pressure medication with a small drink of water the morning of surgery.                                 DO NOT TAKE INSULIN OR ANY OTHER BLOOD SUGAR MEDICATIONS.           The following blood sugar medications have to be stopped 48 hours prior to surgery:                    Metformin        Glucovance          Metaglip             Fortamet           Glucophage                   Riomet             Avandamet          Glimepiride              IF YOU ARE ON ANY OF THESE BLOOD THINNERS, MAKE SURE YOUR PHYSICIAN IS AWARE.                Eliquis/Apixaban             Xarelto/Rivaroxaban             Plavix/Clopidogrel                  Wafarin/Coumadin,Jantoven           Pletal/Cilostazol              Pradaxa/Dibigatran                           PLEASE USE CHLORHEXIDINE  WASH THE NIGHT BEFORE SURGERY AND THE MORNING OF SURGERY.             YOU CANNOT DRIVE YOURSELF HOME FROM THE HOSPITAL THE DAY OF SURGERY.        Please have a  with you.           Bring all medications, that you are currently taking, with you to the hospital the morning of your procedure.           Please leave all valuables at home.          Children under the age of 18 must be accompanied by an adult.          PLEASE UNDERSTAND THAT OUR OFFICE DOES NOT GIVE PATHOLOGY RESULTS OR TEST RESULTS OVER THE PHONE.         THIS WILL BE DISCUSSED WITH YOU ON YOUR FOLLOW UP APPOINTMENT TO DISCUSS IN PERSON.

## 2022-09-02 ENCOUNTER — ANESTHESIA (OUTPATIENT)
Dept: SURGERY | Facility: HOSPITAL | Age: 57
End: 2022-09-02
Payer: MEDICARE

## 2022-09-02 ENCOUNTER — ANESTHESIA EVENT (OUTPATIENT)
Dept: SURGERY | Facility: HOSPITAL | Age: 57
End: 2022-09-02
Payer: MEDICARE

## 2022-09-02 ENCOUNTER — HOSPITAL ENCOUNTER (OUTPATIENT)
Facility: HOSPITAL | Age: 57
Discharge: HOME | End: 2022-09-02
Attending: STUDENT IN AN ORGANIZED HEALTH CARE EDUCATION/TRAINING PROGRAM | Admitting: STUDENT IN AN ORGANIZED HEALTH CARE EDUCATION/TRAINING PROGRAM
Payer: MEDICARE

## 2022-09-02 VITALS
DIASTOLIC BLOOD PRESSURE: 77 MMHG | OXYGEN SATURATION: 97 % | HEIGHT: 67 IN | BODY MASS INDEX: 32.8 KG/M2 | RESPIRATION RATE: 17 BRPM | HEART RATE: 85 BPM | TEMPERATURE: 98 F | WEIGHT: 209 LBS | SYSTOLIC BLOOD PRESSURE: 126 MMHG

## 2022-09-02 DIAGNOSIS — D17.1 LIPOMA OF BACK: ICD-10-CM

## 2022-09-02 PROCEDURE — 25000003 PHARM REV CODE 250: Performed by: ORTHOPAEDIC SURGERY

## 2022-09-02 PROCEDURE — 36000704 HC OR TIME LEV I 1ST 15 MIN: Performed by: STUDENT IN AN ORGANIZED HEALTH CARE EDUCATION/TRAINING PROGRAM

## 2022-09-02 PROCEDURE — D9220A PRA ANESTHESIA: Mod: ANES,,, | Performed by: ANESTHESIOLOGY

## 2022-09-02 PROCEDURE — 71000015 HC POSTOP RECOV 1ST HR: Performed by: STUDENT IN AN ORGANIZED HEALTH CARE EDUCATION/TRAINING PROGRAM

## 2022-09-02 PROCEDURE — 25000003 PHARM REV CODE 250: Performed by: STUDENT IN AN ORGANIZED HEALTH CARE EDUCATION/TRAINING PROGRAM

## 2022-09-02 PROCEDURE — 25000003 PHARM REV CODE 250: Performed by: NURSE ANESTHETIST, CERTIFIED REGISTERED

## 2022-09-02 PROCEDURE — 36000705 HC OR TIME LEV I EA ADD 15 MIN: Performed by: STUDENT IN AN ORGANIZED HEALTH CARE EDUCATION/TRAINING PROGRAM

## 2022-09-02 PROCEDURE — 37000008 HC ANESTHESIA 1ST 15 MINUTES: Performed by: STUDENT IN AN ORGANIZED HEALTH CARE EDUCATION/TRAINING PROGRAM

## 2022-09-02 PROCEDURE — D9220A PRA ANESTHESIA: ICD-10-PCS | Mod: CRNA,,, | Performed by: NURSE ANESTHETIST, CERTIFIED REGISTERED

## 2022-09-02 PROCEDURE — D9220A PRA ANESTHESIA: Mod: CRNA,,, | Performed by: NURSE ANESTHETIST, CERTIFIED REGISTERED

## 2022-09-02 PROCEDURE — 71000016 HC POSTOP RECOV ADDL HR: Performed by: STUDENT IN AN ORGANIZED HEALTH CARE EDUCATION/TRAINING PROGRAM

## 2022-09-02 PROCEDURE — 21931 PR EXCISION TUMOR SOFT TISSUE BACK/FLANK SUBQ 3+CM: ICD-10-PCS | Mod: ,,, | Performed by: STUDENT IN AN ORGANIZED HEALTH CARE EDUCATION/TRAINING PROGRAM

## 2022-09-02 PROCEDURE — 21931 EXC BACK LES SC 3 CM/>: CPT | Mod: ,,, | Performed by: STUDENT IN AN ORGANIZED HEALTH CARE EDUCATION/TRAINING PROGRAM

## 2022-09-02 PROCEDURE — 37000009 HC ANESTHESIA EA ADD 15 MINS: Performed by: STUDENT IN AN ORGANIZED HEALTH CARE EDUCATION/TRAINING PROGRAM

## 2022-09-02 PROCEDURE — 63600175 PHARM REV CODE 636 W HCPCS: Performed by: NURSE ANESTHETIST, CERTIFIED REGISTERED

## 2022-09-02 PROCEDURE — D9220A PRA ANESTHESIA: ICD-10-PCS | Mod: ANES,,, | Performed by: ANESTHESIOLOGY

## 2022-09-02 PROCEDURE — 71000033 HC RECOVERY, INTIAL HOUR: Performed by: STUDENT IN AN ORGANIZED HEALTH CARE EDUCATION/TRAINING PROGRAM

## 2022-09-02 RX ORDER — PHENYLEPHRINE HYDROCHLORIDE 10 MG/ML
INJECTION INTRAVENOUS
Status: DISCONTINUED | OUTPATIENT
Start: 2022-09-02 | End: 2022-09-02

## 2022-09-02 RX ORDER — DEXAMETHASONE SODIUM PHOSPHATE 4 MG/ML
INJECTION, SOLUTION INTRA-ARTICULAR; INTRALESIONAL; INTRAMUSCULAR; INTRAVENOUS; SOFT TISSUE
Status: DISCONTINUED | OUTPATIENT
Start: 2022-09-02 | End: 2022-09-02

## 2022-09-02 RX ORDER — MORPHINE SULFATE 10 MG/ML
4 INJECTION INTRAMUSCULAR; INTRAVENOUS; SUBCUTANEOUS EVERY 5 MIN PRN
Status: DISCONTINUED | OUTPATIENT
Start: 2022-09-02 | End: 2022-09-02 | Stop reason: HOSPADM

## 2022-09-02 RX ORDER — CEFAZOLIN SODIUM 1 G/3ML
INJECTION, POWDER, FOR SOLUTION INTRAMUSCULAR; INTRAVENOUS
Status: DISCONTINUED | OUTPATIENT
Start: 2022-09-02 | End: 2022-09-02

## 2022-09-02 RX ORDER — GLYCOPYRROLATE 0.2 MG/ML
INJECTION INTRAMUSCULAR; INTRAVENOUS
Status: DISCONTINUED | OUTPATIENT
Start: 2022-09-02 | End: 2022-09-02

## 2022-09-02 RX ORDER — ONDANSETRON 2 MG/ML
4 INJECTION INTRAMUSCULAR; INTRAVENOUS DAILY PRN
Status: DISCONTINUED | OUTPATIENT
Start: 2022-09-02 | End: 2022-09-02 | Stop reason: HOSPADM

## 2022-09-02 RX ORDER — MIDAZOLAM HYDROCHLORIDE 1 MG/ML
INJECTION INTRAMUSCULAR; INTRAVENOUS
Status: DISCONTINUED | OUTPATIENT
Start: 2022-09-02 | End: 2022-09-02

## 2022-09-02 RX ORDER — BUPIVACAINE HYDROCHLORIDE 2.5 MG/ML
INJECTION, SOLUTION EPIDURAL; INFILTRATION; INTRACAUDAL
Status: DISCONTINUED | OUTPATIENT
Start: 2022-09-02 | End: 2022-09-02 | Stop reason: HOSPADM

## 2022-09-02 RX ORDER — DIPHENHYDRAMINE HYDROCHLORIDE 50 MG/ML
25 INJECTION INTRAMUSCULAR; INTRAVENOUS EVERY 6 HOURS PRN
Status: DISCONTINUED | OUTPATIENT
Start: 2022-09-02 | End: 2022-09-02 | Stop reason: HOSPADM

## 2022-09-02 RX ORDER — HYDROMORPHONE HYDROCHLORIDE 2 MG/ML
0.5 INJECTION, SOLUTION INTRAMUSCULAR; INTRAVENOUS; SUBCUTANEOUS EVERY 5 MIN PRN
Status: DISCONTINUED | OUTPATIENT
Start: 2022-09-02 | End: 2022-09-02 | Stop reason: HOSPADM

## 2022-09-02 RX ORDER — MEPERIDINE HYDROCHLORIDE 25 MG/ML
25 INJECTION INTRAMUSCULAR; INTRAVENOUS; SUBCUTANEOUS EVERY 10 MIN PRN
Status: DISCONTINUED | OUTPATIENT
Start: 2022-09-02 | End: 2022-09-02 | Stop reason: HOSPADM

## 2022-09-02 RX ORDER — CEFAZOLIN SODIUM 2 G/50ML
2 SOLUTION INTRAVENOUS
Status: DISCONTINUED | OUTPATIENT
Start: 2022-09-02 | End: 2022-09-02 | Stop reason: HOSPADM

## 2022-09-02 RX ORDER — FENTANYL CITRATE 50 UG/ML
INJECTION, SOLUTION INTRAMUSCULAR; INTRAVENOUS
Status: DISCONTINUED | OUTPATIENT
Start: 2022-09-02 | End: 2022-09-02

## 2022-09-02 RX ORDER — ONDANSETRON 2 MG/ML
INJECTION INTRAMUSCULAR; INTRAVENOUS
Status: DISCONTINUED | OUTPATIENT
Start: 2022-09-02 | End: 2022-09-02

## 2022-09-02 RX ORDER — PROPOFOL 10 MG/ML
VIAL (ML) INTRAVENOUS
Status: DISCONTINUED | OUTPATIENT
Start: 2022-09-02 | End: 2022-09-02

## 2022-09-02 RX ORDER — EPHEDRINE SULFATE 50 MG/ML
INJECTION, SOLUTION INTRAVENOUS
Status: DISCONTINUED | OUTPATIENT
Start: 2022-09-02 | End: 2022-09-02

## 2022-09-02 RX ORDER — MUPIROCIN 20 MG/G
OINTMENT TOPICAL DAILY
Qty: 30 G | Refills: 12 | Status: SHIPPED | OUTPATIENT
Start: 2022-09-02

## 2022-09-02 RX ORDER — SODIUM CHLORIDE 9 MG/ML
INJECTION, SOLUTION INTRAVENOUS CONTINUOUS
Status: DISCONTINUED | OUTPATIENT
Start: 2022-09-02 | End: 2022-09-02 | Stop reason: HOSPADM

## 2022-09-02 RX ORDER — LIDOCAINE HYDROCHLORIDE 20 MG/ML
INJECTION, SOLUTION EPIDURAL; INFILTRATION; INTRACAUDAL; PERINEURAL
Status: DISCONTINUED | OUTPATIENT
Start: 2022-09-02 | End: 2022-09-02

## 2022-09-02 RX ADMIN — FENTANYL CITRATE 100 MCG: 50 INJECTION INTRAMUSCULAR; INTRAVENOUS at 10:09

## 2022-09-02 RX ADMIN — LIDOCAINE HYDROCHLORIDE 100 MG: 20 INJECTION, SOLUTION EPIDURAL; INFILTRATION; INTRACAUDAL; PERINEURAL at 10:09

## 2022-09-02 RX ADMIN — MIDAZOLAM HYDROCHLORIDE 2 MG: 1 INJECTION, SOLUTION INTRAMUSCULAR; INTRAVENOUS at 10:09

## 2022-09-02 RX ADMIN — PROPOFOL 150 MG: 10 INJECTION, EMULSION INTRAVENOUS at 10:09

## 2022-09-02 RX ADMIN — ONDANSETRON 4 MG: 2 INJECTION INTRAMUSCULAR; INTRAVENOUS at 10:09

## 2022-09-02 RX ADMIN — EPHEDRINE SULFATE 50 MG: 50 INJECTION INTRAVENOUS at 10:09

## 2022-09-02 RX ADMIN — PHENYLEPHRINE HYDROCHLORIDE 200 MCG: 10 INJECTION INTRAVENOUS at 10:09

## 2022-09-02 RX ADMIN — CEFAZOLIN 2 G: 1 INJECTION, POWDER, FOR SOLUTION INTRAMUSCULAR; INTRAVENOUS; PARENTERAL at 10:09

## 2022-09-02 RX ADMIN — DEXAMETHASONE SODIUM PHOSPHATE 10 MG: 4 INJECTION, SOLUTION INTRA-ARTICULAR; INTRALESIONAL; INTRAMUSCULAR; INTRAVENOUS; SOFT TISSUE at 10:09

## 2022-09-02 RX ADMIN — GLYCOPYRROLATE 0.4 MG: 0.2 INJECTION INTRAMUSCULAR; INTRAVENOUS at 10:09

## 2022-09-02 RX ADMIN — SODIUM CHLORIDE: 9 INJECTION, SOLUTION INTRAVENOUS at 09:09

## 2022-09-02 RX ADMIN — SODIUM CHLORIDE: 9 INJECTION, SOLUTION INTRAVENOUS at 10:09

## 2022-09-02 NOTE — OP NOTE
[facility] - Periop Services  Surgery Department  Operative Note    SUMMARY     Date of Procedure: 9/2/2022     Procedure: Procedure(s) (LRB):  EXCISION, BACK LIPOMA (N/A)     Surgeon(s) and Role:     * Zane Crowe, DO - Primary    Assisting Surgeon: None    Pre-Operative Diagnosis: Lipoma of back [D17.1]    Post-Operative Diagnosis: Post-Op Diagnosis Codes:     * Lipoma of back [D17.1]    Anesthesia: General    Operative Findings (including complications, if any):  7 x 5 x 3 cm fatty tumor    Description of Technical Procedures:  Patient was taken the operating room and placed on the operating table in the right lateral decubitus position.  Patient underwent general anesthesia per the anesthesia team.  The back was then prepped and draped in usual sterile fashion.  A 5 cm transverse incision was made with a 15 blade scalpel we dissected down through the skin and subcutaneous tissue with electrocautery; we identified a multilobulated fatty tumor and circumferentially excise this tumor and sent the tumor to pathology; it measured 7 x 5 x 3 cm.  We irrigated the cavity, all bleeding controlled electrocautery.  We then approximated the subcutaneous space with multiple 2-0 Vicryl simple interrupted sutures.  We approximated the skin edges with multiple 3-0 nylon horizontal mattress sutures.  The skin was cleaned and dried, bacitracin ointment and sterile dressing applied.  Patient was awakened, extubated and taken the PACU in stable condition.  Patient tolerated procedure well.        Estimated Blood Loss (EBL): 5cc           Implants: * No implants in log *    Specimens:   Specimen (24h ago, onward)       Start     Ordered    09/02/22 1029  Surgical Pathology  RELEASE UPON ORDERING         09/02/22 1029                            Condition: Good    Disposition: PACU - hemodynamically stable.    Attestation: I was present and scrubbed for the entire procedure.

## 2022-09-02 NOTE — ANESTHESIA PROCEDURE NOTES
Intubation    Date/Time: 9/2/2022 10:09 AM  Performed by: Lanre Eric CRNA  Authorized by: Bernard Garcia MD     Intubation:     Induction:  Intravenous    Intubated:  Postinduction    Mask Ventilation:  Easy mask    Attempts:  1    Attempted By:  CRNA    Difficult Airway Encountered?: No      Complications:  None    Airway Device:  Supraglottic airway/LMA    Airway Device Size:  4.0    Style/Cuff Inflation:  Cuffed (inflated to minimal occlusive pressure)    Placement Verified By:  Capnometry    Complicating Factors:  None    Findings Post-Intubation:  BS equal bilateral

## 2022-09-02 NOTE — OR NURSING
1107 Rec'd pt to PACU asleep with oral airway in place. VSS. No signs of distress noted. Upper back dressing C/D/I. Will continue to monitor.     1116 Oral airway removed. Respirations even and unlabored. Reoriented to surroundings. Denies pain/needs at this time.     1137 Out of PACU. VSS. No signs of bleeding/distress noted.     1145 Pt to ASC 16 awake and alert with no distress noted. Respirations even and unlabored. Spouse at bedside. Bedside report given to JAQUI Zhang RN. Upper back dressing C/D/I. Denies pain/needs. /73, P 79, R 16, O2 98% room air. Belongings bag given to spouse.

## 2022-09-02 NOTE — TRANSFER OF CARE
"Anesthesia Transfer of Care Note    Patient: Sheri Ho    Procedure(s) Performed: Procedure(s) (LRB):  EXCISION, BACK LIPOMA (N/A)    Patient location: PACU    Anesthesia Type: general    Transport from OR: Transported from OR on room air with adequate spontaneous ventilation    Post pain: adequate analgesia    Post assessment: no apparent anesthetic complications    Post vital signs: stable    Level of consciousness: sedated    Nausea/Vomiting: no nausea/vomiting    Complications: none    Transfer of care protocol was followed      Last vitals:   Visit Vitals  /64   Pulse 86   Temp 36.6 °C (97.9 °F) (Axillary)   Resp 15   Ht 5' 7" (1.702 m)   Wt 94.8 kg (209 lb)   SpO2 96%   Breastfeeding No   BMI 32.73 kg/m²     "

## 2022-09-02 NOTE — ANESTHESIA POSTPROCEDURE EVALUATION
Anesthesia Post Evaluation    Patient: Sheri Ho    Procedure(s) Performed: Procedure(s) (LRB):  EXCISION, BACK LIPOMA (N/A)    Final Anesthesia Type: general      Patient location during evaluation: PACU  Post-procedure vital signs: reviewed and stable  Pain management: adequate  Airway patency: patent    PONV status at discharge: No PONV  Anesthetic complications: no      Cardiovascular status: hemodynamically stable  Respiratory status: unassisted  Hydration status: euvolemic  Follow-up not needed.          Vitals Value Taken Time   /79 09/02/22 1231   Temp 36.6 °C (97.9 °F) 09/02/22 1114   Pulse 72 09/02/22 1243   Resp 16 09/02/22 1136   SpO2 95 % 09/02/22 1243   Vitals shown include unvalidated device data.      Event Time   Out of Recovery 11:37:00         Pain/Avila Score: Avila Score: 10 (9/2/2022 11:47 AM)  Modified Avila Score: 20 (9/2/2022 11:45 AM)

## 2022-09-02 NOTE — ANESTHESIA PREPROCEDURE EVALUATION
09/02/2022  Sheri Ho is a 57 y.o., female.      Pre-op Assessment    I have reviewed the Patient Summary Reports.    I have reviewed the NPO Status.   I have reviewed the Medications.     Review of Systems         Anesthesia Plan  Type of Anesthesia, risks & benefits discussed:    Anesthesia Type: Gen ETT  Intra-op Monitoring Plan: Standard ASA Monitors  Post Op Pain Control Plan: IV/PO Opioids PRN  Induction:  IV  Informed Consent: Informed consent signed with the Patient and all parties understand the risks and agree with anesthesia plan.  All questions answered.   ASA Score: 3    Ready For Surgery From Anesthesia Perspective.     .  NPO greater than 8 hours  NAC  Allergic to codeine and tylenol    Hct 41  8/30/22 EKG: Sinus rhythm with marked sinus arrythmia 60 bpm  Nonspecific intraventricular block   Possible Lateral infarct ,age undetermined    Arthritis SOB (shortness of breath)   Mitral insufficiency Panic attacks   GERD (gastroesophageal reflux disease) Esophageal dysphagia   Gastroparesis    HTN  Asthma  Hepatic cyst  IBS    Airway exam deferred (COVID precautions); adequate ROM at neck.

## 2022-09-06 LAB
ESTROGEN SERPL-MCNC: NORMAL PG/ML
INSULIN SERPL-ACNC: NORMAL U[IU]/ML
LAB AP GROSS DESCRIPTION: NORMAL
LAB AP LABORATORY NOTES: NORMAL
T3RU NFR SERPL: NORMAL %

## 2022-09-09 ENCOUNTER — TELEPHONE (OUTPATIENT)
Dept: FAMILY MEDICINE | Facility: CLINIC | Age: 57
End: 2022-09-09
Payer: MEDICARE

## 2022-09-09 RX ORDER — NITROFURANTOIN 25; 75 MG/1; MG/1
100 CAPSULE ORAL 2 TIMES DAILY
Qty: 14 CAPSULE | Refills: 0 | Status: SHIPPED | OUTPATIENT
Start: 2022-09-09 | End: 2023-01-04

## 2022-09-09 NOTE — TELEPHONE ENCOUNTER
Came in earlier and left a urine specimen.She is c/o painful urination,pelvic pain and low back pain.

## 2022-09-12 ENCOUNTER — CLINICAL SUPPORT (OUTPATIENT)
Dept: REHABILITATION | Facility: HOSPITAL | Age: 57
End: 2022-09-12
Attending: ORTHOPAEDIC SURGERY
Payer: MEDICARE

## 2022-09-12 DIAGNOSIS — Z98.890 S/P RIGHT ROTATOR CUFF REPAIR: ICD-10-CM

## 2022-09-12 DIAGNOSIS — M54.2 CERVICAL PAIN (NECK): Primary | ICD-10-CM

## 2022-09-12 PROCEDURE — 97140 MANUAL THERAPY 1/> REGIONS: CPT

## 2022-09-12 PROCEDURE — 97112 NEUROMUSCULAR REEDUCATION: CPT

## 2022-09-12 PROCEDURE — 97110 THERAPEUTIC EXERCISES: CPT

## 2022-09-12 NOTE — PROGRESS NOTES
[facility]   Physical Therapy Treatment Note     Name: Sheri Ho  Clinic Number: [id number]    Therapy Diagnosis: neck pain, right shoulder pain  Physician: Cain Treviño MD    Visit Date: 9/12/2022     Physician Orders: PT Eval and Treat   Medical Diagnosis from Referral: see above  Evaluation Date: 7/11/2022  Authorization Period Expiration: 6/21/23  Plan of Care Expiration: 9/9/2022  Progress Note Due: 9/15/22  Visit # / Visits authorized: 10/17   FOTO: 34/49    PTA Visit #: 0/5     Time In: 0920  Time Out:1000  Total Billable Time: 40 minutes    SUBJECTIVE     Pt reports having lipoma removed on left thoracic area nearly 2 weeks ago; patient reports soreness at surgery site  She was compliant with home exercise program.  Response to previous treatment: no complaint  Functional change: patient is having difficulty sleeping since recent excision of lipoma.    Pain: 5/10  Location: bilateral neck, upper trap, right shoulder, left thoracic paraspinals    OBJECTIVE     Objective Measures updated at progress report unless specified.   Right shoulder flexion ACTIVE , PASSIVE RANGE OF MOTION 140; er 82, ir L3  Treatment     Sheri received the treatments listed below:      therapeutic exercises to develop strength, ROM and posture for 10 minutes including:  UBE   scap retract with chin tuck x 10 with 5 sec hold  Supine cane flexion stretch [id number]   Upper trap stretch 4x30sh  Elbow flexion yellow x 30 (right)  Elbow extension yellow x 30 (right)    NEUROMUSCULAR REEDUCATION 15 minutes  SL scaption x 20  Scap PNF x 30  Supine er yellow x 30 right  Supine ir yellow x 30 right    manual therapy techniques: Joint mobilizations and Myofacial release were applied to the: neck for 15 minutes, including:    Graston #5, #4, #3 to suboccipitals, upper traps, thoracic paraspinals    Manual traction x 0 min    Patient Education and Home Exercises     Home Exercises Provided and Patient  Education Provided     Education provided:     Written Home Exercises Provided: Patient instructed to cont prior HEP. Exercises were reviewed and Sheri was able to demonstrate them prior to the end of the session.  Sheri demonstrated good  understanding of the education provided. See EMR under Patient Instructions for exercises provided during therapy sessions    ASSESSMENT   Pt tolerated cervical and MYOFASCIAL treatment, left shoulder AROM    Sheri Is progressing towards her goals.   Pt prognosis is Good.     Pt will continue to benefit from skilled outpatient physical therapy to address the deficits listed in the problem list box on initial evaluation, provide pt/family education and to maximize pt's level of independence in the home and community environment.     Pt's spiritual, cultural and educational needs considered and pt agreeable to plan of care and goals.     Anticipated barriers to physical therapy: none    Goals:   Short Term Goals: 4 weeks   1.  Improve right shoulder ACTIVE ROM flexion to >130, er >65, ir to L4  2.  Improve right shld strength to 4  3.  Reach in overhead shelf for 2# weight /s deviation  4.  Tolerate 45 minutes of exercise with <7/10 pain in neck and right shld.  Long Term Goals: 8 weeks   1.  Improve right shld ACTIVE ROM >140, ir to L2  2.  Improve right shld strength to 4+  3.  Reach in overhead shelf for 4# weight /s deviation  4.  Tolerate 5 minutes of position tolerance overhead with <5/10.      PLAN     Plan of care Certification: 9/12/2022 to 10/11/2022     Outpatient Physical Therapy 2 times weekly for 8 weeks to include the following interventions: Cervical/Lumbar Traction, Electrical Stimulation 0-300hz, Iontophoresis (with 2.0 ml dexamethasone), Manual Therapy, Moist Heat/ Ice, Neuromuscular Re-ed, Patient Education, Self Care, Therapeutic Activities, Therapeutic Exercise and Ultrasound.       Nila Collier, MSPT  Physical Therapist

## 2022-09-19 ENCOUNTER — OFFICE VISIT (OUTPATIENT)
Dept: SURGERY | Facility: CLINIC | Age: 57
End: 2022-09-19
Payer: MEDICARE

## 2022-09-19 ENCOUNTER — CLINICAL SUPPORT (OUTPATIENT)
Dept: REHABILITATION | Facility: HOSPITAL | Age: 57
End: 2022-09-19
Attending: ORTHOPAEDIC SURGERY
Payer: MEDICARE

## 2022-09-19 DIAGNOSIS — D17.1 LIPOMA OF BACK: Primary | ICD-10-CM

## 2022-09-19 DIAGNOSIS — M54.2 CERVICAL PAIN (NECK): ICD-10-CM

## 2022-09-19 DIAGNOSIS — Z98.890 S/P RIGHT ROTATOR CUFF REPAIR: Primary | ICD-10-CM

## 2022-09-19 PROCEDURE — 97140 MANUAL THERAPY 1/> REGIONS: CPT

## 2022-09-19 PROCEDURE — 1159F MED LIST DOCD IN RCRD: CPT | Mod: CPTII,,, | Performed by: STUDENT IN AN ORGANIZED HEALTH CARE EDUCATION/TRAINING PROGRAM

## 2022-09-19 PROCEDURE — 97110 THERAPEUTIC EXERCISES: CPT

## 2022-09-19 PROCEDURE — 3044F PR MOST RECENT HEMOGLOBIN A1C LEVEL <7.0%: ICD-10-PCS | Mod: CPTII,,, | Performed by: STUDENT IN AN ORGANIZED HEALTH CARE EDUCATION/TRAINING PROGRAM

## 2022-09-19 PROCEDURE — 3044F HG A1C LEVEL LT 7.0%: CPT | Mod: CPTII,,, | Performed by: STUDENT IN AN ORGANIZED HEALTH CARE EDUCATION/TRAINING PROGRAM

## 2022-09-19 PROCEDURE — 99214 OFFICE O/P EST MOD 30 MIN: CPT | Mod: PBBFAC | Performed by: STUDENT IN AN ORGANIZED HEALTH CARE EDUCATION/TRAINING PROGRAM

## 2022-09-19 PROCEDURE — 99024 POSTOP FOLLOW-UP VISIT: CPT | Mod: ,,, | Performed by: STUDENT IN AN ORGANIZED HEALTH CARE EDUCATION/TRAINING PROGRAM

## 2022-09-19 PROCEDURE — 97112 NEUROMUSCULAR REEDUCATION: CPT

## 2022-09-19 PROCEDURE — 1159F PR MEDICATION LIST DOCUMENTED IN MEDICAL RECORD: ICD-10-PCS | Mod: CPTII,,, | Performed by: STUDENT IN AN ORGANIZED HEALTH CARE EDUCATION/TRAINING PROGRAM

## 2022-09-19 PROCEDURE — 99024 PR POST-OP FOLLOW-UP VISIT: ICD-10-PCS | Mod: ,,, | Performed by: STUDENT IN AN ORGANIZED HEALTH CARE EDUCATION/TRAINING PROGRAM

## 2022-09-19 NOTE — PROGRESS NOTES
[facility]   Physical Therapy Treatment Note     Name: Sheri Ho  Clinic Number: [id number]    Therapy Diagnosis: neck pain, right shoulder pain  Physician: Cain Treviño MD    Visit Date: 9/19/2022     Physician Orders: PT Eval and Treat   Medical Diagnosis from Referral: see above  Evaluation Date: 7/11/2022  Authorization Period Expiration: 6/21/23  Plan of Care Expiration: 9/9/2022  Progress Note Due: 9/15/22  Visit # / Visits authorized: 10/17   FOTO: 34/49    PTA Visit #: 0/5     Time In: 0835  Time 0915  Total Billable Time: 40 minutes    SUBJECTIVE     Pt reports itching from surgery site, from tape.  Patient goes to follow-up /p lipoma referral  She was compliant with home exercise program.  Response to previous treatment: no complaint  Functional change: patient is having difficulty sleeping since recent excision of lipoma.    Pain: 4/10  Location: bilateral neck, upper trap, right shoulder, left thoracic paraspinals    OBJECTIVE     Objective Measures updated at progress report unless specified.   Right shoulder flexion ACTIVE , PASSIVE RANGE OF MOTION 140; er 82, ir L3  Treatment     Sheri received the treatments listed below:      therapeutic exercises to develop strength, ROM and posture for 15 minutes including:  UBE   scap retract with chin tuck x 10 with 5 sec hold  Supine cane flexion stretch [id number]   Upper trap stretch 4x30sh  Elbow flexion yellow x 30 (right)  Elbow extension yellow x 30 (right)  Cane flexion standing-2# x 20      NEUROMUSCULAR REEDUCATION 20 minutes  SL scaption x 20  Scap PNF x 30  Supine er yellow x 30 right  Supine ir yellow x 30 right  Wall boxes x 10 red    manual therapy techniques: Joint mobilizations and Myofacial release were applied to the: neck for 5 minutes, including:    Graston #5, #4, #3 to suboccipitals, upper traps, thoracic paraspinals    Manual traction x 0 min    Patient Education and Home Exercises     Home  Exercises Provided and Patient Education Provided     Education provided:     Written Home Exercises Provided: Patient instructed to cont prior HEP. Exercises were reviewed and Sheri was able to demonstrate them prior to the end of the session.  Sheri demonstrated good  understanding of the education provided. See EMR under Patient Instructions for exercises provided during therapy sessions    ASSESSMENT   Pt tolerated cervical and MYOFASCIAL treatment, left shoulder AROM    Sheri Is progressing towards her goals.   Pt prognosis is Good.     Pt will continue to benefit from skilled outpatient physical therapy to address the deficits listed in the problem list box on initial evaluation, provide pt/family education and to maximize pt's level of independence in the home and community environment.     Pt's spiritual, cultural and educational needs considered and pt agreeable to plan of care and goals.     Anticipated barriers to physical therapy: none    Goals:   Short Term Goals: 4 weeks   1.  Improve right shoulder ACTIVE ROM flexion to >130, er >65, ir to L4  2.  Improve right shld strength to 4  3.  Reach in overhead shelf for 2# weight /s deviation  4.  Tolerate 45 minutes of exercise with <7/10 pain in neck and right shld.  Long Term Goals: 8 weeks   1.  Improve right shld ACTIVE ROM >140, ir to L2  2.  Improve right shld strength to 4+  3.  Reach in overhead shelf for 4# weight /s deviation  4.  Tolerate 5 minutes of position tolerance overhead with <5/10.      PLAN     Plan of care Certification: 9/12/2022 to 10/11/2022     Outpatient Physical Therapy 2 times weekly for 8 weeks to include the following interventions: Cervical/Lumbar Traction, Electrical Stimulation 0-300hz, Iontophoresis (with 2.0 ml dexamethasone), Manual Therapy, Moist Heat/ Ice, Neuromuscular Re-ed, Patient Education, Self Care, Therapeutic Activities, Therapeutic Exercise and Ultrasound.       Nila Collier, MSPT  Physical  Therapist

## 2022-09-19 NOTE — PROGRESS NOTES
Subjective:       Patient ID: Sheri Ho is a 57 y.o. female.    Chief Complaint: Follow-up (Suture removal)    57-year-old  female presents to the clinic for postop evaluation status post excision of a back lipoma.  Patient doing well, no complaints.  Denies any chest pain/shortness of breath, nausea/vomiting/diarrhea, fever/chills.    Review of Systems   Constitutional: Negative.    HENT: Negative.     Eyes: Negative.    Respiratory:  Negative for shortness of breath.    Cardiovascular:  Negative for chest pain.   Gastrointestinal:  Negative for abdominal distention, abdominal pain, blood in stool, change in bowel habit and change in bowel habit.   Genitourinary: Negative.  Negative for hematuria.   Musculoskeletal:  Negative for myalgias.   Neurological:  Negative for dizziness and weakness.   Psychiatric/Behavioral: Negative.         Objective:      Physical Exam  Constitutional:       General: She is not in acute distress.     Appearance: Normal appearance.   HENT:      Head: Normocephalic.   Cardiovascular:      Rate and Rhythm: Normal rate.   Pulmonary:      Effort: Pulmonary effort is normal. No respiratory distress.   Abdominal:      General: There is no distension.      Tenderness: There is no abdominal tenderness.   Musculoskeletal:         General: Normal range of motion.        Back:       Comments: Incision clean dry and intact and healing well; sutures in place.   Skin:     General: Skin is warm.      Coloration: Skin is not jaundiced.   Neurological:      General: No focal deficit present.      Mental Status: She is alert and oriented to person, place, and time.      Cranial Nerves: No cranial nerve deficit.       Assessment:       Problem List Items Addressed This Visit    None  Visit Diagnoses       Lipoma of back    -  Primary              Plan:       Patient doing well.  Sutures removed.  Area healing well, no drainage or signs of infection.  No need for further follow-up.   Pathology showed lipoma

## 2022-09-21 ENCOUNTER — CLINICAL SUPPORT (OUTPATIENT)
Dept: REHABILITATION | Facility: HOSPITAL | Age: 57
End: 2022-09-21
Attending: ORTHOPAEDIC SURGERY
Payer: MEDICARE

## 2022-09-21 PROCEDURE — 97112 NEUROMUSCULAR REEDUCATION: CPT

## 2022-09-21 PROCEDURE — 97140 MANUAL THERAPY 1/> REGIONS: CPT

## 2022-09-21 PROCEDURE — 97161 PT EVAL LOW COMPLEX 20 MIN: CPT

## 2022-09-21 NOTE — PLAN OF CARE
[facility]   Physical Therapy Treatment Note     Name: Sheri Ho  Clinic Number: [id number]    Therapy Diagnosis: neck pain, right shoulder pain  Physician: Cain Treviño MD    Visit Date: 9/21/2022     Physician Orders: PT Eval and Treat   Medical Diagnosis from Referral: see above  Evaluation Date: 7/11/2022  Authorization Period Expiration: 6/21/23  Plan of Care Expiration: 10/11/2022  Progress Note Due: 10/15/22  Visit # / Visits authorized: 13/17   FOTO: 34/49    PTA Visit #: 0/5     Time In: 0835  Time 0915  Total Billable Time: 40 minutes    SUBJECTIVE       Pt reportsShe was compliant with home exercise program.  Response to previous treatment: no complaint  Functional change: patient is having difficulty sleeping since recent excision of lipoma.    Pain: 4/10  Location: bilateral neck, upper trap, right shoulder, left thoracic paraspinals    OBJECTIVE     Objective Measures updated at progress report unless specified.   Right shoulder flexion ACTIVE , PASSIVE RANGE OF MOTION 140; er 82, ir L3  Treatment     Sheri received the treatments listed below:      therapeutic exercises to develop strength, ROM and posture for 20minutes including:  UBE   scap retract with chin tuck x 10 with 5 sec hold  Supine cane flexion stretch [id number]   Upper trap stretch 4x30sh  Elbow flexion yellow x 30 (right)  Elbow extension yellow x 30 (right)  Cane flexion standing-2# x 20      NEUROMUSCULAR REEDUCATION 20 minutes  SL scaption x 20  Scap PNF x 30  Supine er yellow x 30 right  Supine ir yellow x 30 right  Wall boxes x 10 red  Cane flexion up wall [id number]      manual therapy techniques: Joint mobilizations and Myofacial release were applied to the: neck for 5 minutes, including:  Pnf D2 flexion yellow x 10      Graston #5, #4, #3 to suboccipitals, upper traps, thoracic paraspinals    Manual traction x 0 min    Patient Education and Home Exercises     Home Exercises Provided and  Patient Education Provided     Education provided:     Written Home Exercises Provided: Patient instructed to cont prior HEP. Exercises were reviewed and Sheri was able to demonstrate them prior to the end of the session.  Sheri demonstrated good  understanding of the education provided. See EMR under Patient Instructions for exercises provided during therapy sessions    ASSESSMENT   Self-limiting behavior during measurement of right shoulder ACTIVE ROM.    Sheri Is progressing towards her goals.   Pt prognosis is Good.     Pt will continue to benefit from skilled outpatient physical therapy to address the deficits listed in the problem list box on initial evaluation, provide pt/family education and to maximize pt's level of independence in the home and community environment.     Pt's spiritual, cultural and educational needs considered and pt agreeable to plan of care and goals.     Anticipated barriers to physical therapy: none    Goals:   Short Term Goals: 4 weeks   1.  Improve right shoulder ACTIVE ROM flexion to >130, er >65, ir to L4.  ACTIVE ROM flexion 112, er 70, ir L5; PASSIVE RANGE OF MOTION flexion 145  2.  Improve right shld strength to 4  3.  Reach in overhead shelf for 2# weight /s deviation  4.  Tolerate 45 minutes of exercise with <7/10 pain in neck and right shld.  Long Term Goals: 8 weeks   1.  Improve right shld ACTIVE ROM >140, ir to L2  2.  Improve right shld strength to 4+  3.  Reach in overhead shelf for 4# weight /s deviation  4.  Tolerate 5 minutes of position tolerance overhead with <5/10.      PLAN     Plan of care Certification: 9/12/2022 to 10/11/2022     Outpatient Physical Therapy 2 times weekly for 8 weeks to include the following interventions: Cervical/Lumbar Traction, Electrical Stimulation 0-300hz, Iontophoresis (with 2.0 ml dexamethasone), Manual Therapy, Moist Heat/ Ice, Neuromuscular Re-ed, Patient Education, Self Care, Therapeutic Activities, Therapeutic Exercise and Ultrasound.        Nila Collier, MSPT  Physical Therapist

## 2022-09-29 ENCOUNTER — CLINICAL SUPPORT (OUTPATIENT)
Dept: REHABILITATION | Facility: HOSPITAL | Age: 57
End: 2022-09-29
Attending: ORTHOPAEDIC SURGERY
Payer: MEDICARE

## 2022-09-29 DIAGNOSIS — Z98.890 S/P RIGHT ROTATOR CUFF REPAIR: Primary | ICD-10-CM

## 2022-09-29 DIAGNOSIS — M54.2 CERVICAL PAIN (NECK): ICD-10-CM

## 2022-09-29 PROCEDURE — 97110 THERAPEUTIC EXERCISES: CPT | Mod: CQ

## 2022-09-29 PROCEDURE — 97140 MANUAL THERAPY 1/> REGIONS: CPT | Mod: CQ

## 2022-09-29 NOTE — PROGRESS NOTES
[facility]   Physical Therapy Treatment Note     Name: Sheri Ho  Clinic Number: [id number]    Therapy Diagnosis: neck pain, right shoulder pain  Physician: Cain Treviño MD    Visit Date: 9/29/2022     Physician Orders: PT Eval and Treat   Medical Diagnosis from Referral: see above  Evaluation Date: 7/11/2022  Authorization Period Expiration: 6/21/23  Plan of Care Expiration: 10/11/2022  Progress Note Due: 10/15/22  Visit # / Visits authorized: 14/17   FOTO: 34/49    PTA Visit #: 1/5     Time In: 7:45 am  Time out: 8:25 am   Total Billable Time: 40 minutes    SUBJECTIVE     Patient reports: Pain is about a 4 today. I have been taking medicine.  She was compliant with home exercise program.  Response to previous treatment: no complaint  Functional change: patient is having difficulty sleeping since recent excision of lipoma.    Pain: 4/10  Location: bilateral neck, upper trap, right shoulder, left thoracic paraspinals    OBJECTIVE     Objective Measures updated at progress report unless specified.   Right shoulder flexion ACTIVE , PASSIVE RANGE OF MOTION 140; er 82, ir L3  Treatment     Sheri received the treatments listed below:      therapeutic exercises to develop strength, ROM and posture for 25 minutes including:  UBE 5 minutes  Pulleys 6 minutes  Shoulder horizontal abduction 2 x 10 red TB  Shoulder external rotation 2 x 10 red TB  Corner stretch 4 x 20 seconds  Upper trap stretch 4x30sh      (Not today)  scap retract with chin tuck x 10 with 5 sec hold  Supine cane flexion stretch [id number]   Elbow flexion yellow x 30 (right)  Elbow extension yellow x 30 (right)  Cane flexion standing-2# x 20      NEUROMUSCULAR REEDUCATION 0 minutes  SL scaption x 20  Scap PNF x 30  Supine er yellow x 30 right  Supine ir yellow x 30 right  Wall boxes x 10 red    manual therapy techniques: Joint mobilizations and Myofacial release were applied to the: neck for 15 minutes,  including:  Myofascial release   Cervical stretches   Manual traction     Patient Education and Home Exercises     Home Exercises Provided and Patient Education Provided     Education provided:     Written Home Exercises Provided: Patient instructed to cont prior HEP. Exercises were reviewed and Sheri was able to demonstrate them prior to the end of the session.  Sheri demonstrated good  understanding of the education provided. See EMR under Patient Instructions for exercises provided during therapy sessions    ASSESSMENT   Pt tolerated cervical and MYOFASCIAL treatment. Patient continue to have pain in neck and shoulder. Patient continue to have pain in right shoulder and is self limited due to pain.      Sheri Is progressing towards her goals.   Pt prognosis is Good.     Pt will continue to benefit from skilled outpatient physical therapy to address the deficits listed in the problem list box on initial evaluation, provide pt/family education and to maximize pt's level of independence in the home and community environment.     Pt's spiritual, cultural and educational needs considered and pt agreeable to plan of care and goals.     Anticipated barriers to physical therapy: none    Goals:   Short Term Goals: 4 weeks   1.  Improve right shoulder ACTIVE ROM flexion to >130, er >65, ir to L4  2.  Improve right shld strength to 4  3.  Reach in overhead shelf for 2# weight /s deviation  4.  Tolerate 45 minutes of exercise with <7/10 pain in neck and right shld.  Long Term Goals: 8 weeks   1.  Improve right shld ACTIVE ROM >140, ir to L2  2.  Improve right shld strength to 4+  3.  Reach in overhead shelf for 4# weight /s deviation  4.  Tolerate 5 minutes of position tolerance overhead with <5/10.      PLAN     Plan of care Certification: 9/12/2022 to 10/11/2022     Outpatient Physical Therapy 2 times weekly for 8 weeks to include the following interventions: Cervical/Lumbar Traction, Electrical Stimulation 0-300hz,  Iontophoresis (with 2.0 ml dexamethasone), Manual Therapy, Moist Heat/ Ice, Neuromuscular Re-ed, Patient Education, Self Care, Therapeutic Activities, Therapeutic Exercise and Ultrasound.       Nila Collier, MSPT  Physical Therapist

## 2022-10-04 ENCOUNTER — CLINICAL SUPPORT (OUTPATIENT)
Dept: REHABILITATION | Facility: HOSPITAL | Age: 57
End: 2022-10-04
Attending: ORTHOPAEDIC SURGERY
Payer: MEDICARE

## 2022-10-04 DIAGNOSIS — M54.2 CERVICAL PAIN (NECK): Primary | ICD-10-CM

## 2022-10-04 PROCEDURE — 97110 THERAPEUTIC EXERCISES: CPT | Mod: CQ

## 2022-10-04 PROCEDURE — 97010 HOT OR COLD PACKS THERAPY: CPT | Mod: CQ

## 2022-10-04 PROCEDURE — 97014 ELECTRIC STIMULATION THERAPY: CPT | Mod: CQ

## 2022-10-04 PROCEDURE — 97140 MANUAL THERAPY 1/> REGIONS: CPT | Mod: CQ

## 2022-10-04 NOTE — PROGRESS NOTES
[facility]   Physical Therapy Treatment Note     Name: Sheri Ho  Clinic Number: [id number]    Therapy Diagnosis: neck pain, right shoulder pain  Physician: Cain Treviño MD    Visit Date: 10/4/2022     Physician Orders: PT Eval and Treat   Medical Diagnosis from Referral: see above  Evaluation Date: 7/11/2022  Authorization Period Expiration: 6/21/23  Plan of Care Expiration: 10/11/2022  Progress Note Due: 10/15/22  Visit # / Visits authorized: 15/17   FOTO: 34/49    PTA Visit #: 2/5     Time In: 1:05 am  Time out: 1:53 am   Total Billable Time: 48 minutes    SUBJECTIVE     Patient reports: My  has wore me out and I am hurting.  She was compliant with home exercise program.  Response to previous treatment: no complaint  Functional change: patient is having difficulty sleeping since recent excision of lipoma.    Pain: 4/10  Location: bilateral neck, upper trap, right shoulder, left thoracic paraspinals    OBJECTIVE     Objective Measures updated at progress report unless specified.   Right shoulder flexion ACTIVE , PASSIVE RANGE OF MOTION 140; er 82, ir L3  Treatment     Sheri received the treatments listed below:      therapeutic exercises to develop strength, ROM and posture for 25 minutes including:  UBE 5 minutes  Pulleys 6 minutes  Shoulder horizontal abduction 2 x 10 red TB  Shoulder external rotation 2 x 10 red TB  Corner stretch 4 x 20 seconds  Upper trap stretch 4x30sh      (Not today)  scap retract with chin tuck x 10 with 5 sec hold  Supine cane flexion stretch [id number]   Elbow flexion yellow x 30 (right)  Elbow extension yellow x 30 (right)  Cane flexion standing-2# x 20      NEUROMUSCULAR REEDUCATION 0 minutes  SL scaption x 20  Scap PNF x 30  Supine er yellow x 30 right  Supine ir yellow x 30 right  Wall boxes x 10 red    manual therapy techniques: Joint mobilizations and Myofacial release were applied to the: neck for 15 minutes, including:  Myofascial  release   Cervical stretches   Manual traction     Pre-mod and MHP to cervical for x 15 minutes for pain control    Patient Education and Home Exercises     Home Exercises Provided and Patient Education Provided     Education provided:     Written Home Exercises Provided: Patient instructed to cont prior HEP. Exercises were reviewed and Sheri was able to demonstrate them prior to the end of the session.  Sheri demonstrated good  understanding of the education provided. See EMR under Patient Instructions for exercises provided during therapy sessions    ASSESSMENT   Pt tolerated cervical and MYOFASCIAL treatment. Patient continue to have pain in neck and shoulder. Patient continue to have pain in right shoulder and is self limited due to pain.  Patient states that her pain is worse today from helping my .    Sheri Is progressing towards her goals.   Pt prognosis is Good.     Pt will continue to benefit from skilled outpatient physical therapy to address the deficits listed in the problem list box on initial evaluation, provide pt/family education and to maximize pt's level of independence in the home and community environment.     Pt's spiritual, cultural and educational needs considered and pt agreeable to plan of care and goals.     Anticipated barriers to physical therapy: none    Goals:   Short Term Goals: 4 weeks   1.  Improve right shoulder ACTIVE ROM flexion to >130, er >65, ir to L4  2.  Improve right shld strength to 4  3.  Reach in overhead shelf for 2# weight /s deviation  4.  Tolerate 45 minutes of exercise with <7/10 pain in neck and right shld.  Long Term Goals: 8 weeks   1.  Improve right shld ACTIVE ROM >140, ir to L2  2.  Improve right shld strength to 4+  3.  Reach in overhead shelf for 4# weight /s deviation  4.  Tolerate 5 minutes of position tolerance overhead with <5/10.      PLAN     Plan of care Certification: 9/12/2022 to 10/11/2022     Outpatient Physical Therapy 2 times weekly for 8  weeks to include the following interventions: Cervical/Lumbar Traction, Electrical Stimulation 0-300hz, Iontophoresis (with 2.0 ml dexamethasone), Manual Therapy, Moist Heat/ Ice, Neuromuscular Re-ed, Patient Education, Self Care, Therapeutic Activities, Therapeutic Exercise and Ultrasound.       Kelly Trujillo, PTA  10/4/2022

## 2022-10-06 ENCOUNTER — CLINICAL SUPPORT (OUTPATIENT)
Dept: REHABILITATION | Facility: HOSPITAL | Age: 57
End: 2022-10-06
Attending: ORTHOPAEDIC SURGERY
Payer: MEDICARE

## 2022-10-06 DIAGNOSIS — M47.12 CERVICAL SPONDYLOSIS WITH MYELOPATHY: Primary | ICD-10-CM

## 2022-10-06 PROCEDURE — 97110 THERAPEUTIC EXERCISES: CPT | Mod: CQ

## 2022-10-06 PROCEDURE — 97014 ELECTRIC STIMULATION THERAPY: CPT | Mod: CQ

## 2022-10-06 PROCEDURE — 97140 MANUAL THERAPY 1/> REGIONS: CPT | Mod: CQ

## 2022-10-06 NOTE — PROGRESS NOTES
[facility]   Physical Therapy Treatment Note     Name: Sheri Ho  Clinic Number: [id number]    Therapy Diagnosis: neck pain, right shoulder pain  Physician: Cain Treviño MD    Visit Date: 10/6/2022     Physician Orders: PT Eval and Treat   Medical Diagnosis from Referral: see above  Evaluation Date: 7/11/2022  Authorization Period Expiration: 6/21/23  Plan of Care Expiration: 10/11/2022  Progress Note Due: 10/15/22  Visit # / Visits authorized: 16/16  FOTO: 34/49    PTA Visit #: 3/5     Time In: 0833  Time out: 0927  Total Billable Time: 54 minutes    SUBJECTIVE     Patient reports: caring for  is causing increased pain in right shoulder, still has some discomfort from where tumor was removed  She was compliant with home exercise program.  Response to previous treatment: no complaint  Functional change: patient is having difficulty sleeping since recent excision of lipoma.    Pain: 4/10  Location: bilateral neck, upper trap, right shoulder, left thoracic paraspinals    OBJECTIVE     Objective Measures updated at progress report unless specified.   Right shoulder flexion ACTIVE , PASSIVE RANGE OF MOTION 140; er 82, ir L3  Treatment     Sheri received the treatments listed below:      therapeutic exercises to develop strength, ROM and posture for 10 minutes including:  UBE 5 minutes  Pulleys 0 minutes  scap retract with chin tuck x 10 with 5 sec hold  Upper trap stretch 4x30sh      (Not today)    Supine cane flexion stretch [id number]   Elbow flexion yellow x 30 (right)  Elbow extension yellow x 30 (right)  Cane flexion standing-2# x 20  Shoulder horizontal abduction 2 x 10 red TB  Shoulder external rotation 2 x 10 red TB  Corner stretch 4 x 20 seconds    NEUROMUSCULAR REEDUCATION 0 minutes  SL scaption x 20  Scap PNF x 30  Supine er yellow x 30 right  Supine ir yellow x 30 right  Wall boxes x 10 red    manual therapy techniques: Joint mobilizations and Myofacial  release were applied to the: neck for 30 minutes, including:  Myofascial release   Cervical stretches       IFC and MHP to cervical for x 15 minutes for pain control    Patient Education and Home Exercises     Home Exercises Provided and Patient Education Provided     Education provided: provided green band for rows and shoulder extension ex    Written Home Exercises Provided: Patient instructed to cont prior HEP. Exercises were reviewed and Sheri was able to demonstrate them prior to the end of the session.  Sheri demonstrated good  understanding of the education provided. See EMR under Patient Instructions for exercises provided during therapy sessions    ASSESSMENT   Pt tolerated cervical and MYOFASCIAL treatment. PT with increased tone in [place]. Pt will be able to continue with HEP for shoulder and cervical strengthening and ROM.    Sheri Is progressing towards her goals.   Pt prognosis is Good.          Pt's spiritual, cultural and educational needs considered and pt agreeable to plan of care and goals.     Anticipated barriers to physical therapy: none    Goals:   Short Term Goals: 4 weeks   1.  Improve right shoulder ACTIVE ROM flexion to >130, er >65, ir to L4  2.  Improve right shld strength to 4  3.  Reach in overhead shelf for 2# weight /s deviation  4.  Tolerate 45 minutes of exercise with <7/10 pain in neck and right shld.  Long Term Goals: 8 weeks   1.  Improve right shld ACTIVE ROM >140, ir to L2  2.  Improve right shld strength to 4+  3.  Reach in overhead shelf for 4# weight /s deviation  4.  Tolerate 5 minutes of position tolerance overhead with <5/10.      PLAN   D/C with HEP    Plan of care Certification: 9/12/2022 to 10/11/2022     Outpatient Physical Therapy 2 times weekly for 8 weeks to include the following interventions: Cervical/Lumbar Traction, Electrical Stimulation 0-300hz, Iontophoresis (with 2.0 ml dexamethasone), Manual Therapy, Moist Heat/ Ice, Neuromuscular Re-ed, Patient Education,  Self Care, Therapeutic Activities, Therapeutic Exercise and Ultrasound.       Maegan Gillette, PTA  10/4/2022

## 2022-10-11 ENCOUNTER — CLINICAL SUPPORT (OUTPATIENT)
Dept: REHABILITATION | Facility: HOSPITAL | Age: 57
End: 2022-10-11
Attending: ORTHOPAEDIC SURGERY
Payer: MEDICARE

## 2022-10-11 DIAGNOSIS — M47.12 CERVICAL SPONDYLOSIS WITH MYELOPATHY: Primary | ICD-10-CM

## 2022-10-11 PROCEDURE — 97032 APPL MODALITY 1+ESTIM EA 15: CPT | Mod: CQ

## 2022-10-11 PROCEDURE — 97140 MANUAL THERAPY 1/> REGIONS: CPT | Mod: CQ

## 2022-10-11 PROCEDURE — 97110 THERAPEUTIC EXERCISES: CPT | Mod: CQ

## 2022-10-11 NOTE — PROGRESS NOTES
[facility]   Physical Therapy Treatment Note     Name: Sheri Ho  Clinic Number: [id number]    Therapy Diagnosis: neck pain, right shoulder pain  Physician: Cain Treviño MD    Visit Date: 10/11/2022     Physician Orders: PT Eval and Treat   Medical Diagnosis from Referral: see above  Evaluation Date: 7/11/2022  Authorization Period Expiration: 6/21/23  Plan of Care Expiration: 10/11/2022  Progress Note Due: 10/15/22  Visit # / Visits authorized: 16/16  FOTO: 34/49    PTA Visit #: 3/5     Time In: 1303  Time out: 1400  Total Billable Time: 57 minutes    SUBJECTIVE     Patient reports: right shoulder and chest is still not right and won't be til she can rest it  She was compliant with home exercise program.  Response to previous treatment: no complaint  Functional change: patient is having difficulty sleeping since recent excision of lipoma.    Pain: 4/10  Location: bilateral neck, upper trap, right shoulder, left thoracic paraspinals    OBJECTIVE   FOTO score 42  Objective Measures updated at progress report unless specified.   Right shoulder flexion ACTIVE , PASSIVE RANGE OF MOTION 140; er 82, ir L3  Treatment     Sheri received the treatments listed below:      therapeutic exercises to develop strength, ROM and posture for 10 minutes including:  UBE 5 minutes  Pulleys 0 minutes  scap retract with chin tuck x 10 with 5 sec hold  Upper trap stretch 4x30sh  Cybex rows #4 x 20 each   Wall boxes red x 10 with 5 sec hold    NEUROMUSCULAR REEDUCATION 0 minutes  SL scaption x 20  Scap PNF x 30  Supine er yellow x 30 right  Supine ir yellow x 30 right  Wall boxes x 10 red    manual therapy techniques: Joint mobilizations and Myofacial release were applied to the: neck for 10 minutes, including:  Myofascial release  and joint mobs to right shouder      [place] x 5 min at 175 to R UT and mid trap, followed by premod combo x 5 min 1.5    Patient Education and Home Exercises      Home Exercises Provided and Patient Education Provided     Education provided: provided green band for rows and shoulder extension ex    Written Home Exercises Provided: Patient instructed to cont prior HEP. Exercises were reviewed and Sheri was able to demonstrate them prior to the end of the session.  Sheri demonstrated good  understanding of the education provided. See EMR under Patient Instructions for exercises provided during therapy sessions    ASSESSMENT   Pt with good ROM of R shoulder in flexion, EXTERNAL ROTATION, INTERNAL ROTATION but has increased tone in pecs and Uts. Pt desires to continue PT for pain management of right shoulder area.    Sheri Is progressing towards her goals.   Pt prognosis is Good.          Pt's spiritual, cultural and educational needs considered and pt agreeable to plan of care and goals.     Anticipated barriers to physical therapy: none    Goals:   Short Term Goals: 4 weeks   1.  Improve right shoulder ACTIVE ROM flexion to >130, er >65, ir to L4  2.  Improve right shld strength to 4  3.  Reach in overhead shelf for 2# weight /s deviation  4.  Tolerate 45 minutes of exercise with <7/10 pain in neck and right shld.  Long Term Goals: 8 weeks   1.  Improve right shld ACTIVE ROM >140, ir to L2  2.  Improve right shld strength to 4+  3.  Reach in overhead shelf for 4# weight /s deviation  4.  Tolerate 5 minutes of position tolerance overhead with <5/10.      PLAN   Consult with PT and MD regarding continuing POC    Plan of care Certification: 9/12/2022 to 10/11/2022     Outpatient Physical Therapy 2 times weekly for 8 weeks to include the following interventions: Cervical/Lumbar Traction, Electrical Stimulation 0-300hz, Iontophoresis (with 2.0 ml dexamethasone), Manual Therapy, Moist Heat/ Ice, Neuromuscular Re-ed, Patient Education, Self Care, Therapeutic Activities, Therapeutic Exercise and Ultrasound.       Maegan Gillette, PTA  10/4/2022

## 2022-11-09 DIAGNOSIS — Z71.89 COMPLEX CARE COORDINATION: ICD-10-CM

## 2022-11-30 DIAGNOSIS — I10 HYPERTENSION, UNSPECIFIED TYPE: ICD-10-CM

## 2022-11-30 RX ORDER — HYDRALAZINE HYDROCHLORIDE 10 MG/1
10 TABLET, FILM COATED ORAL 2 TIMES DAILY
Qty: 180 TABLET | Refills: 3 | Status: SHIPPED | OUTPATIENT
Start: 2022-11-30 | End: 2023-04-06 | Stop reason: SDUPTHER

## 2022-12-13 ENCOUNTER — TELEPHONE (OUTPATIENT)
Dept: GASTROENTEROLOGY | Facility: CLINIC | Age: 57
End: 2022-12-13
Payer: MEDICARE

## 2022-12-13 ENCOUNTER — OFFICE VISIT (OUTPATIENT)
Dept: GASTROENTEROLOGY | Facility: CLINIC | Age: 57
End: 2022-12-13
Payer: MEDICARE

## 2022-12-13 VITALS
HEIGHT: 67 IN | HEART RATE: 67 BPM | OXYGEN SATURATION: 98 % | SYSTOLIC BLOOD PRESSURE: 130 MMHG | WEIGHT: 210.63 LBS | BODY MASS INDEX: 33.06 KG/M2 | DIASTOLIC BLOOD PRESSURE: 67 MMHG

## 2022-12-13 DIAGNOSIS — K59.00 CONSTIPATION, UNSPECIFIED CONSTIPATION TYPE: ICD-10-CM

## 2022-12-13 DIAGNOSIS — K74.00 LIVER FIBROSIS: ICD-10-CM

## 2022-12-13 DIAGNOSIS — K76.0 FATTY LIVER: Primary | ICD-10-CM

## 2022-12-13 PROCEDURE — 99214 OFFICE O/P EST MOD 30 MIN: CPT | Mod: ,,, | Performed by: NURSE PRACTITIONER

## 2022-12-13 PROCEDURE — 3008F PR BODY MASS INDEX (BMI) DOCUMENTED: ICD-10-PCS | Mod: CPTII,,, | Performed by: NURSE PRACTITIONER

## 2022-12-13 PROCEDURE — 3075F PR MOST RECENT SYSTOLIC BLOOD PRESS GE 130-139MM HG: ICD-10-PCS | Mod: CPTII,,, | Performed by: NURSE PRACTITIONER

## 2022-12-13 PROCEDURE — 1160F RVW MEDS BY RX/DR IN RCRD: CPT | Mod: CPTII,,, | Performed by: NURSE PRACTITIONER

## 2022-12-13 PROCEDURE — 3008F BODY MASS INDEX DOCD: CPT | Mod: CPTII,,, | Performed by: NURSE PRACTITIONER

## 2022-12-13 PROCEDURE — 99214 PR OFFICE/OUTPT VISIT, EST, LEVL IV, 30-39 MIN: ICD-10-PCS | Mod: ,,, | Performed by: NURSE PRACTITIONER

## 2022-12-13 PROCEDURE — 3044F HG A1C LEVEL LT 7.0%: CPT | Mod: CPTII,,, | Performed by: NURSE PRACTITIONER

## 2022-12-13 PROCEDURE — 1159F MED LIST DOCD IN RCRD: CPT | Mod: CPTII,,, | Performed by: NURSE PRACTITIONER

## 2022-12-13 PROCEDURE — 3078F PR MOST RECENT DIASTOLIC BLOOD PRESSURE < 80 MM HG: ICD-10-PCS | Mod: CPTII,,, | Performed by: NURSE PRACTITIONER

## 2022-12-13 PROCEDURE — 1159F PR MEDICATION LIST DOCUMENTED IN MEDICAL RECORD: ICD-10-PCS | Mod: CPTII,,, | Performed by: NURSE PRACTITIONER

## 2022-12-13 PROCEDURE — 1160F PR REVIEW ALL MEDS BY PRESCRIBER/CLIN PHARMACIST DOCUMENTED: ICD-10-PCS | Mod: CPTII,,, | Performed by: NURSE PRACTITIONER

## 2022-12-13 PROCEDURE — 3075F SYST BP GE 130 - 139MM HG: CPT | Mod: CPTII,,, | Performed by: NURSE PRACTITIONER

## 2022-12-13 PROCEDURE — 3078F DIAST BP <80 MM HG: CPT | Mod: CPTII,,, | Performed by: NURSE PRACTITIONER

## 2022-12-13 PROCEDURE — 3044F PR MOST RECENT HEMOGLOBIN A1C LEVEL <7.0%: ICD-10-PCS | Mod: CPTII,,, | Performed by: NURSE PRACTITIONER

## 2022-12-13 NOTE — PROGRESS NOTES
"    Sheri Ho is a 57 y.o. female here for Follow-up        PCP: Rajni Barton  Referring Provider: No referring provider defined for this encounter.     HPI:  Presents for 6 month follow up liver fibrosis. Last liver ultrasound F 2 or F 3. Patient has a portosystemic "natural shunt". Ultrasound is scheduled for next week. She continues to report reflux. She is taking Protonix in the am but has not been taking the recommended Pepcid at night. EGD/Dilation 7/29/22 retained food. GES normal. No hematochezia or melena. Last colonoscopy 2016, recommendation to repeat in 10 years. Chronic constipation, Linzess is helping. Reports continued depression that affects ability to exercise. She is followed by [facility].    Follow-up  Pertinent negatives include no abdominal pain, change in bowel habit, chest pain, fatigue, fever, nausea or vomiting.       ROS:  Review of Systems   Constitutional:  Negative for appetite change, fatigue, fever and unexpected weight change.   HENT:  Negative for trouble swallowing.    Respiratory:  Negative for shortness of breath and wheezing.    Cardiovascular:  Negative for chest pain.   Gastrointestinal:  Positive for constipation and reflux. Negative for abdominal pain, blood in stool, change in bowel habit, diarrhea, nausea, vomiting and change in bowel habit.   Genitourinary:  Negative for dysuria.   Musculoskeletal:  Negative for gait problem.   Integumentary:  Negative for pallor.   Neurological:  Negative for dizziness and light-headedness.   Hematological:  Does not bruise/bleed easily.   Psychiatric/Behavioral:  Positive for dysphoric mood. The patient is not nervous/anxious.         PMHX:  has a past medical history of Arthritis, Esophageal dysphagia (4/29/2021), Gastroparesis (7/29/2022), GERD (gastroesophageal reflux disease), Mitral insufficiency, Panic attacks, and SOB (shortness of breath).    PSHX:  has a past surgical history that includes Cholecystectomy; " Colonoscopy; Esophagogastroduodenoscopy; Hysterectomy; Shoulder arthroscopy; Cystoscopy; Rotator cuff repair; Shoulder arthroscopy (Right, 11/30/2021); Arthroscopic repair of rotator cuff of shoulder (Right, 11/30/2021); Oophorectomy; and Lipoma resection (N/A, 9/2/2022).    PFHX: family history includes Breast cancer in her cousin; Diabetes in her father; Heart attack in her father; Heart disease in her father; Hypertension in her father, maternal grandmother, mother, and sister.    PSlHX:  reports that she has never smoked. She has never used smokeless tobacco. She reports that she does not drink alcohol and does not use drugs.        Review of patient's allergies indicates:   Allergen Reactions    Codeine Nausea And Vomiting and Swelling       Medication List with Changes/Refills   Current Medications    ALBUTEROL (PROVENTIL/VENTOLIN HFA) 90 MCG/ACTUATION INHALER    Inhale 2 puffs into the lungs every 4 (four) hours as needed for Wheezing.    ALBUTEROL SULFATE 90 MCG/ACTUATION AEBS    Inhale 180 mcg into the lungs every 6 (six) hours as needed.    AMLODIPINE (NORVASC) 5 MG TABLET    Take 0.5 tablets (2.5 mg total) by mouth once daily.    ASPIRIN (ECOTRIN) 81 MG EC TABLET    Take 81 mg by mouth once daily.    BREXPIPRAZOLE (REXULTI) 1 MG TAB    One a day for depression    BUDESONIDE-FORMOTEROL 160-4.5 MCG (SYMBICORT) 160-4.5 MCG/ACTUATION HFAA    Inhale 2 puffs into the lungs 2 (two) times a day.    BUPROPION (WELLBUTRIN SR) 100 MG TBSR 12 HR TABLET    Take 1 tablet (100 mg total) by mouth once daily.    CETIRIZINE (ZYRTEC) 10 MG TABLET    Take 1 tablet (10 mg total) by mouth once daily.    CYCLOBENZAPRINE (FLEXERIL) 10 MG TABLET    Take 10 mg by mouth 3 (three) times daily.    DICYCLOMINE (BENTYL) 20 MG TABLET        ESCITALOPRAM OXALATE (LEXAPRO) 20 MG TABLET    Take 1 tablet (20 mg total) by mouth once daily.    HYDRALAZINE (APRESOLINE) 10 MG TABLET    Take 1 tablet (10 mg total) by mouth 2 (two) times daily.  "   HYDROCHLOROTHIAZIDE (HYDRODIURIL) 12.5 MG TAB    TAKE 1 TABLET ONE TIME DAILY    HYDROCODONE-ACETAMINOPHEN (NORCO)  MG PER TABLET    Take 1 tablet by mouth every 8 (eight) hours as needed.    HYDROXYZINE HCL (ATARAX) 10 MG TAB    1-2 tabs Q6hrs prn for nerves, itching, insomnia, or nausea    IBUPROFEN (ADVIL,MOTRIN) 800 MG TABLET    Take 1 tablet (800 mg total) by mouth 2 (two) times a day.    LINACLOTIDE (LINZESS) 145 MCG CAP CAPSULE    Take 1 capsule (145 mcg total) by mouth before breakfast.    LOTEPREDNOL (LOTEMAX) 0.5 % OPHTHALMIC SUSPENSION    1 drop. As directed    MIRABEGRON ORAL    Take by mouth.    MULTIVITAMIN (MULTIPLE VITAMINS ORAL)    Take 1 tablet by mouth once daily. OTC: w/ Iron & Folic Acid    MUPIROCIN (BACTROBAN) 2 % OINTMENT    Apply topically once daily.    NITROFURANTOIN, MACROCRYSTAL-MONOHYDRATE, (MACROBID) 100 MG CAPSULE    Take 1 capsule (100 mg total) by mouth 2 (two) times daily.    PANTOPRAZOLE (PROTONIX) 40 MG TABLET    Take 1 tablet (40 mg total) by mouth once daily.    PROPRANOLOL (INDERAL) 40 MG TABLET    Take 1 tablet (40 mg total) by mouth 2 (two) times daily.    TRAZODONE (DESYREL) 50 MG TABLET    Take 50 mg by mouth nightly as needed.        Objective Findings:  Vital Signs:  /67   Pulse 67   Ht 5' 7" (1.702 m)   Wt 95.5 kg (210 lb 9.6 oz)   SpO2 98%   BMI 32.98 kg/m²  Body mass index is 32.98 kg/m².    Physical Exam:  Physical Exam  Vitals and nursing note reviewed.   Constitutional:       General: She is not in acute distress.     Appearance: Normal appearance. She is not ill-appearing.   HENT:      Mouth/Throat:      Mouth: Mucous membranes are moist.   Cardiovascular:      Rate and Rhythm: Normal rate.   Pulmonary:      Effort: Pulmonary effort is normal.      Breath sounds: No wheezing, rhonchi or rales.   Abdominal:      General: Bowel sounds are normal. There is no distension.      Palpations: Abdomen is soft. There is no mass.      Tenderness: There is " no abdominal tenderness. There is no guarding or rebound.      Hernia: No hernia is present.   Skin:     General: Skin is warm and dry.      Coloration: Skin is not jaundiced or pale.   Neurological:      Mental Status: She is alert and oriented to person, place, and time.   Psychiatric:         Mood and Affect: Mood is depressed. Affect is not flat.        Labs:  Lab Results   Component Value Date    WBC 4.40 (L) 08/30/2022    HGB 14.0 08/30/2022    HCT 41.4 08/30/2022    MCV 88.7 08/30/2022    RDW 12.8 08/30/2022     08/30/2022    LYMPH 43.9 (H) 08/30/2022    LYMPH 1.93 08/30/2022    MONO 12.3 (H) 08/30/2022    EOS 0.09 08/30/2022    BASO 0.04 08/30/2022     Lab Results   Component Value Date     08/30/2022    K 4.4 08/30/2022     08/30/2022    CO2 27 08/30/2022     (H) 08/30/2022    BUN 7 08/30/2022    CREATININE 0.88 08/30/2022    CALCIUM 9.2 08/30/2022    PROT 7.4 04/01/2022    ALBUMIN 3.7 04/01/2022    BILITOT 1.0 04/01/2022    ALKPHOS 103 04/01/2022    AST 15 04/01/2022    ALT 29 04/01/2022         Imaging: No results found.      Assessment:  Sheri Ho is a 57 y.o. female here with:  1. Fatty liver    2. Liver fibrosis    3. Constipation, unspecified constipation type          Recommendations:  1. Keep appointment for liver ultrasound on 12/21  2. Exercise 150 minutes per week  Weight loss of 7-10%. Weight loss should be gradual  Diet low in saturated fats and carbohydrates  Good glucose and cholesterol control  Keep appointment for liver ultrasound  3. Continue Protonix and restart Pepcid at night  4. Continue Miralax      Follow up in about 6 months (around 6/13/2023).      Order summary:  Orders Placed This Encounter    Comprehensive Metabolic Panel    CBC Auto Differential       Thank you for allowing me to participate in the care of Sheri Ho.      KOKO Gastelum

## 2022-12-13 NOTE — PATIENT INSTRUCTIONS
Exercise 150 minutes per week  Weight loss of 7-10%. Weight loss should be gradual  Diet low in saturated fats and carbohydrates  Good glucose and cholesterol control   Restart Pepcid at night. Continue Protonix

## 2022-12-21 ENCOUNTER — TELEPHONE (OUTPATIENT)
Dept: GASTROENTEROLOGY | Facility: CLINIC | Age: 57
End: 2022-12-21
Payer: MEDICARE

## 2022-12-21 ENCOUNTER — HOSPITAL ENCOUNTER (OUTPATIENT)
Dept: RADIOLOGY | Facility: HOSPITAL | Age: 57
Discharge: HOME | End: 2022-12-21
Attending: NURSE PRACTITIONER
Payer: MEDICARE

## 2022-12-21 DIAGNOSIS — K74.00 LIVER FIBROSIS: ICD-10-CM

## 2022-12-21 PROCEDURE — 76705 ECHO EXAM OF ABDOMEN: CPT | Mod: TC

## 2022-12-21 PROCEDURE — 76705 ECHO EXAM OF ABDOMEN: CPT | Mod: 26,,, | Performed by: RADIOLOGY

## 2022-12-21 PROCEDURE — 76705 US ABDOMEN LIMITED: ICD-10-PCS | Mod: 26,,, | Performed by: RADIOLOGY

## 2022-12-21 NOTE — TELEPHONE ENCOUNTER
Results called to patient. Verbalized understanding.          ----- Message from DELFINA Toussaint sent at 12/21/2022  4:26 PM CST -----  Ultrasound is normal.

## 2023-01-04 ENCOUNTER — OFFICE VISIT (OUTPATIENT)
Dept: FAMILY MEDICINE | Facility: CLINIC | Age: 58
End: 2023-01-04
Payer: MEDICARE

## 2023-01-04 VITALS
DIASTOLIC BLOOD PRESSURE: 70 MMHG | BODY MASS INDEX: 32.33 KG/M2 | HEART RATE: 78 BPM | RESPIRATION RATE: 16 BRPM | SYSTOLIC BLOOD PRESSURE: 120 MMHG | WEIGHT: 206 LBS | OXYGEN SATURATION: 98 % | HEIGHT: 67 IN | TEMPERATURE: 98 F

## 2023-01-04 DIAGNOSIS — I10 HYPERTENSION, UNSPECIFIED TYPE: Primary | ICD-10-CM

## 2023-01-04 DIAGNOSIS — R30.0 DYSURIA: ICD-10-CM

## 2023-01-04 DIAGNOSIS — R53.82 CHRONIC FATIGUE: ICD-10-CM

## 2023-01-04 DIAGNOSIS — F33.1 MODERATE EPISODE OF RECURRENT MAJOR DEPRESSIVE DISORDER: ICD-10-CM

## 2023-01-04 DIAGNOSIS — Z79.899 ENCOUNTER FOR LONG-TERM (CURRENT) USE OF OTHER MEDICATIONS: ICD-10-CM

## 2023-01-04 DIAGNOSIS — R73.03 PREDIABETES: ICD-10-CM

## 2023-01-04 LAB
ANION GAP SERPL CALCULATED.3IONS-SCNC: 9 MMOL/L (ref 7–16)
BILIRUB UR QL STRIP: NEGATIVE
BUN SERPL-MCNC: 9 MG/DL (ref 7–18)
BUN/CREAT SERPL: 9 (ref 6–20)
CALCIUM SERPL-MCNC: 9.1 MG/DL (ref 8.5–10.1)
CAOX CRY URNS QL MICRO: ABNORMAL /HPF
CHLORIDE SERPL-SCNC: 107 MMOL/L (ref 98–107)
CLARITY UR: CLEAR
CO2 SERPL-SCNC: 28 MMOL/L (ref 21–32)
COLOR UR: YELLOW
CREAT SERPL-MCNC: 1.02 MG/DL (ref 0.55–1.02)
EGFR (NO RACE VARIABLE) (RUSH/TITUS): 65 ML/MIN/1.73M²
EST. AVERAGE GLUCOSE BLD GHB EST-MCNC: 120 MG/DL
GLUCOSE SERPL-MCNC: 113 MG/DL (ref 74–106)
GLUCOSE UR STRIP-MCNC: NORMAL MG/DL
HBA1C MFR BLD HPLC: 6.2 % (ref 4.5–6.6)
KETONES UR STRIP-SCNC: NEGATIVE MG/DL
LEUKOCYTE ESTERASE UR QL STRIP: ABNORMAL
MUCOUS, UA: ABNORMAL /LPF
NITRITE UR QL STRIP: NEGATIVE
PH UR STRIP: 5.5 PH UNITS
POTASSIUM SERPL-SCNC: 4 MMOL/L (ref 3.5–5.1)
PROT UR QL STRIP: NEGATIVE
RBC # UR STRIP: NEGATIVE /UL
RBC #/AREA URNS HPF: 2 /HPF
SODIUM SERPL-SCNC: 140 MMOL/L (ref 136–145)
SP GR UR STRIP: 1.02
SQUAMOUS #/AREA URNS LPF: ABNORMAL /HPF
T4 FREE SERPL-MCNC: 0.93 NG/DL (ref 0.76–1.46)
TSH SERPL DL<=0.005 MIU/L-ACNC: 2.17 UIU/ML (ref 0.36–3.74)
UROBILINOGEN UR STRIP-ACNC: NORMAL MG/DL
WBC #/AREA URNS HPF: 7 /HPF

## 2023-01-04 PROCEDURE — 3074F PR MOST RECENT SYSTOLIC BLOOD PRESSURE < 130 MM HG: ICD-10-PCS | Mod: ,,, | Performed by: FAMILY MEDICINE

## 2023-01-04 PROCEDURE — 1159F MED LIST DOCD IN RCRD: CPT | Mod: ,,, | Performed by: FAMILY MEDICINE

## 2023-01-04 PROCEDURE — 3008F BODY MASS INDEX DOCD: CPT | Mod: ,,, | Performed by: FAMILY MEDICINE

## 2023-01-04 PROCEDURE — 99214 OFFICE O/P EST MOD 30 MIN: CPT | Mod: ,,, | Performed by: FAMILY MEDICINE

## 2023-01-04 PROCEDURE — 3074F SYST BP LT 130 MM HG: CPT | Mod: ,,, | Performed by: FAMILY MEDICINE

## 2023-01-04 PROCEDURE — 1160F RVW MEDS BY RX/DR IN RCRD: CPT | Mod: ,,, | Performed by: FAMILY MEDICINE

## 2023-01-04 PROCEDURE — 84439 T4, FREE: ICD-10-PCS | Mod: ,,, | Performed by: CLINICAL MEDICAL LABORATORY

## 2023-01-04 PROCEDURE — 1160F PR REVIEW ALL MEDS BY PRESCRIBER/CLIN PHARMACIST DOCUMENTED: ICD-10-PCS | Mod: ,,, | Performed by: FAMILY MEDICINE

## 2023-01-04 PROCEDURE — 80048 BASIC METABOLIC PNL TOTAL CA: CPT | Mod: ,,, | Performed by: CLINICAL MEDICAL LABORATORY

## 2023-01-04 PROCEDURE — 84443 ASSAY THYROID STIM HORMONE: CPT | Mod: ,,, | Performed by: CLINICAL MEDICAL LABORATORY

## 2023-01-04 PROCEDURE — 81001 URINALYSIS AUTO W/SCOPE: CPT | Mod: ,,, | Performed by: CLINICAL MEDICAL LABORATORY

## 2023-01-04 PROCEDURE — 3044F PR MOST RECENT HEMOGLOBIN A1C LEVEL <7.0%: ICD-10-PCS | Mod: ,,, | Performed by: FAMILY MEDICINE

## 2023-01-04 PROCEDURE — 3078F PR MOST RECENT DIASTOLIC BLOOD PRESSURE < 80 MM HG: ICD-10-PCS | Mod: ,,, | Performed by: FAMILY MEDICINE

## 2023-01-04 PROCEDURE — 84439 ASSAY OF FREE THYROXINE: CPT | Mod: ,,, | Performed by: CLINICAL MEDICAL LABORATORY

## 2023-01-04 PROCEDURE — 1159F PR MEDICATION LIST DOCUMENTED IN MEDICAL RECORD: ICD-10-PCS | Mod: ,,, | Performed by: FAMILY MEDICINE

## 2023-01-04 PROCEDURE — 87086 URINE CULTURE/COLONY COUNT: CPT | Mod: ,,, | Performed by: CLINICAL MEDICAL LABORATORY

## 2023-01-04 PROCEDURE — 3008F PR BODY MASS INDEX (BMI) DOCUMENTED: ICD-10-PCS | Mod: ,,, | Performed by: FAMILY MEDICINE

## 2023-01-04 PROCEDURE — 3044F HG A1C LEVEL LT 7.0%: CPT | Mod: ,,, | Performed by: FAMILY MEDICINE

## 2023-01-04 PROCEDURE — 99214 PR OFFICE/OUTPT VISIT, EST, LEVL IV, 30-39 MIN: ICD-10-PCS | Mod: ,,, | Performed by: FAMILY MEDICINE

## 2023-01-04 PROCEDURE — 84443 TSH: ICD-10-PCS | Mod: ,,, | Performed by: CLINICAL MEDICAL LABORATORY

## 2023-01-04 PROCEDURE — 87086 CULTURE, URINE: ICD-10-PCS | Mod: ,,, | Performed by: CLINICAL MEDICAL LABORATORY

## 2023-01-04 PROCEDURE — 81001 URINALYSIS: ICD-10-PCS | Mod: ,,, | Performed by: CLINICAL MEDICAL LABORATORY

## 2023-01-04 PROCEDURE — 83036 HEMOGLOBIN GLYCOSYLATED A1C: CPT | Mod: ,,, | Performed by: CLINICAL MEDICAL LABORATORY

## 2023-01-04 PROCEDURE — 3078F DIAST BP <80 MM HG: CPT | Mod: ,,, | Performed by: FAMILY MEDICINE

## 2023-01-04 PROCEDURE — 83036 HEMOGLOBIN A1C: ICD-10-PCS | Mod: ,,, | Performed by: CLINICAL MEDICAL LABORATORY

## 2023-01-04 PROCEDURE — 80048 BASIC METABOLIC PANEL: ICD-10-PCS | Mod: ,,, | Performed by: CLINICAL MEDICAL LABORATORY

## 2023-01-04 RX ORDER — DICYCLOMINE HYDROCHLORIDE 20 MG/1
20 TABLET ORAL EVERY 6 HOURS
Qty: 120 TABLET | Refills: 0 | Status: SHIPPED | OUTPATIENT
Start: 2023-01-04 | End: 2023-02-03

## 2023-01-04 NOTE — PROGRESS NOTES
Subjective:       Patient ID: Sheri Ho is a 57 y.o. female.    Chief Complaint: Follow-up    6 mo checkup.  Doing ok, still with chronic fatigue.      Review of Systems   Constitutional:  Positive for fatigue. Negative for appetite change, chills, fever and unexpected weight change.   Respiratory:  Negative for cough and shortness of breath.    Cardiovascular:  Negative for chest pain and leg swelling.   Gastrointestinal:  Negative for abdominal pain.   Musculoskeletal:  Negative for arthralgias.   Integumentary:  Negative for rash.   Neurological:  Negative for weakness.   Psychiatric/Behavioral:  The patient is not nervous/anxious.        Objective:      Physical Exam  Constitutional:       General: She is not in acute distress.     Appearance: Normal appearance.   Cardiovascular:      Rate and Rhythm: Normal rate and regular rhythm.      Heart sounds: Normal heart sounds.   Pulmonary:      Breath sounds: Normal breath sounds.   Abdominal:      General: Abdomen is flat.      Palpations: Abdomen is soft.   Skin:     General: Skin is warm and dry.   Neurological:      Mental Status: She is alert. Mental status is at baseline.   Psychiatric:         Mood and Affect: Mood normal.         Behavior: Behavior normal.         Thought Content: Thought content normal.         Judgment: Judgment normal.       Assessment:       1. Hypertension, unspecified type  T4, Free    TSH    Urinalysis    Basic Metabolic Panel    T4, Free    TSH    Urinalysis    Basic Metabolic Panel    Urinalysis, Microscopic      2. Encounter for long-term (current) use of other medications  Hemoglobin A1C    Urinalysis    Urine culture    Basic Metabolic Panel    Hemoglobin A1C    Urinalysis    Urine culture    Basic Metabolic Panel    Urinalysis, Microscopic      3. Prediabetes  Hemoglobin A1C    Urinalysis    Basic Metabolic Panel    Hemoglobin A1C    Urinalysis    Basic Metabolic Panel    Urinalysis, Microscopic      4. Dysuria   Urinalysis    Urine culture    Urinalysis    Urine culture    Urinalysis, Microscopic      5. Chronic fatigue  T4, Free    TSH    Basic Metabolic Panel    T4, Free    TSH    Basic Metabolic Panel      6. Moderate episode of recurrent major depressive disorder            Plan:       Already had sleep study in 2019, was neg per pt  Labs  UTD shingles, flu, covid  UTD bebe, cscope, eye exam  Requests Bentyl to mail order  Checking labs

## 2023-01-06 LAB — UA COMPLETE W REFLEX CULTURE PNL UR: NORMAL

## 2023-01-20 ENCOUNTER — HOSPITAL ENCOUNTER (OUTPATIENT)
Dept: RADIOLOGY | Facility: HOSPITAL | Age: 58
Discharge: HOME | End: 2023-01-20
Attending: RADIOLOGY
Payer: MEDICARE

## 2023-01-20 ENCOUNTER — HOSPITAL ENCOUNTER (OUTPATIENT)
Dept: RADIOLOGY | Facility: HOSPITAL | Age: 58
Discharge: HOME | End: 2023-01-20
Payer: MEDICARE

## 2023-01-20 DIAGNOSIS — N63.0 BREAST LUMP: ICD-10-CM

## 2023-01-20 PROCEDURE — 77066 DX MAMMO INCL CAD BI: CPT | Mod: 26,,, | Performed by: RADIOLOGY

## 2023-01-20 PROCEDURE — 77066 DX MAMMO INCL CAD BI: CPT | Mod: TC

## 2023-01-20 PROCEDURE — 76641 ULTRASOUND BREAST COMPLETE: CPT | Mod: 26,50,, | Performed by: RADIOLOGY

## 2023-01-20 PROCEDURE — 77066 MAMMO DIGITAL DIAGNOSTIC BILAT: ICD-10-PCS | Mod: 26,,, | Performed by: RADIOLOGY

## 2023-01-20 PROCEDURE — 76641 ULTRASOUND BREAST COMPLETE: CPT | Mod: TC,50

## 2023-01-20 PROCEDURE — 76641 US BREAST BILATERAL COMPLETE: ICD-10-PCS | Mod: 26,50,, | Performed by: RADIOLOGY

## 2023-03-21 DIAGNOSIS — M25.512 BILATERAL SHOULDER PAIN, UNSPECIFIED CHRONICITY: Primary | ICD-10-CM

## 2023-03-21 DIAGNOSIS — M25.511 BILATERAL SHOULDER PAIN, UNSPECIFIED CHRONICITY: Primary | ICD-10-CM

## 2023-03-22 ENCOUNTER — OFFICE VISIT (OUTPATIENT)
Dept: ORTHOPEDICS | Facility: CLINIC | Age: 58
End: 2023-03-22
Payer: MEDICARE

## 2023-03-22 ENCOUNTER — HOSPITAL ENCOUNTER (OUTPATIENT)
Dept: RADIOLOGY | Facility: HOSPITAL | Age: 58
Discharge: HOME | End: 2023-03-22
Attending: ORTHOPAEDIC SURGERY
Payer: MEDICARE

## 2023-03-22 VITALS — WEIGHT: 206 LBS | HEIGHT: 67 IN | BODY MASS INDEX: 32.33 KG/M2

## 2023-03-22 DIAGNOSIS — M25.512 BILATERAL SHOULDER PAIN, UNSPECIFIED CHRONICITY: ICD-10-CM

## 2023-03-22 DIAGNOSIS — M25.512 BILATERAL SHOULDER PAIN, UNSPECIFIED CHRONICITY: Primary | ICD-10-CM

## 2023-03-22 DIAGNOSIS — M25.511 BILATERAL SHOULDER PAIN, UNSPECIFIED CHRONICITY: Primary | ICD-10-CM

## 2023-03-22 DIAGNOSIS — M25.511 BILATERAL SHOULDER PAIN, UNSPECIFIED CHRONICITY: ICD-10-CM

## 2023-03-22 PROCEDURE — 99214 PR OFFICE/OUTPT VISIT, EST, LEVL IV, 30-39 MIN: ICD-10-PCS | Mod: S$PBB,25,, | Performed by: ORTHOPAEDIC SURGERY

## 2023-03-22 PROCEDURE — 1159F MED LIST DOCD IN RCRD: CPT | Mod: CPTII,,, | Performed by: ORTHOPAEDIC SURGERY

## 2023-03-22 PROCEDURE — 73030 X-RAY EXAM OF SHOULDER: CPT | Mod: TC,50

## 2023-03-22 PROCEDURE — 3008F BODY MASS INDEX DOCD: CPT | Mod: CPTII,,, | Performed by: ORTHOPAEDIC SURGERY

## 2023-03-22 PROCEDURE — 20610 DRAIN/INJ JOINT/BURSA W/O US: CPT | Mod: S$PBB,LT,, | Performed by: ORTHOPAEDIC SURGERY

## 2023-03-22 PROCEDURE — 99214 OFFICE O/P EST MOD 30 MIN: CPT | Mod: S$PBB,25,, | Performed by: ORTHOPAEDIC SURGERY

## 2023-03-22 PROCEDURE — 20610 DRAIN/INJ JOINT/BURSA W/O US: CPT | Mod: PBBFAC | Performed by: ORTHOPAEDIC SURGERY

## 2023-03-22 PROCEDURE — 20610 PR DRAIN/INJECT LARGE JOINT/BURSA: ICD-10-PCS | Mod: S$PBB,LT,, | Performed by: ORTHOPAEDIC SURGERY

## 2023-03-22 PROCEDURE — 99214 OFFICE O/P EST MOD 30 MIN: CPT | Mod: PBBFAC | Performed by: ORTHOPAEDIC SURGERY

## 2023-03-22 PROCEDURE — 1159F PR MEDICATION LIST DOCUMENTED IN MEDICAL RECORD: ICD-10-PCS | Mod: CPTII,,, | Performed by: ORTHOPAEDIC SURGERY

## 2023-03-22 PROCEDURE — 3044F PR MOST RECENT HEMOGLOBIN A1C LEVEL <7.0%: ICD-10-PCS | Mod: CPTII,,, | Performed by: ORTHOPAEDIC SURGERY

## 2023-03-22 PROCEDURE — 73030 XR SHOULDER COMPLETE 2 OR MORE VIEWS BILATERAL: ICD-10-PCS | Mod: 26,50,, | Performed by: ORTHOPAEDIC SURGERY

## 2023-03-22 PROCEDURE — 3044F HG A1C LEVEL LT 7.0%: CPT | Mod: CPTII,,, | Performed by: ORTHOPAEDIC SURGERY

## 2023-03-22 PROCEDURE — 73030 X-RAY EXAM OF SHOULDER: CPT | Mod: 26,50,, | Performed by: ORTHOPAEDIC SURGERY

## 2023-03-22 PROCEDURE — 3008F PR BODY MASS INDEX (BMI) DOCUMENTED: ICD-10-PCS | Mod: CPTII,,, | Performed by: ORTHOPAEDIC SURGERY

## 2023-03-22 NOTE — PROGRESS NOTES
Radiology Interpretation        Patient Name: Sheri Ho  Date: 3/22/2023  YOB: 1965  MRN# [id number]        ORDERING DIAGNOSIS:    Encounter Diagnosis   Name Primary?    Bilateral shoulder pain, unspecified chronicity Yes        Two views right shoulder skeletally mature individual postsurgical changes distal clavicle no fractures or subluxations impression postsurgical changes right shoulder               Cain Treviño MD

## 2023-03-22 NOTE — PROGRESS NOTES
Radiology Interpretation        Patient Name: Sheri Ho  Date: 3/22/2023  YOB: 1965  MRN# [id number]        ORDERING DIAGNOSIS:    Encounter Diagnosis   Name Primary?    Bilateral shoulder pain, unspecified chronicity Yes           Two views left shoulder skeletally mature individual there is some mild degenerative changes AC joint no fractures or subluxations no bony lesions impression degenerative changes AC joint left shoulder            Cain Treviño MD

## 2023-03-22 NOTE — PROGRESS NOTES
Patient is here for bilateral shoulder pain she is had nerves burned in her neck she is had a tumor removed from her back at this time she is having pain in both shoulders on the right she is having pain over the medial border the scapula she is having some scapular bursitis on the left she is having pain on impingement crossed adduction testing.  X-rays today show she has some degenerative changes AC joint on left on the right she has a postsurgical changes.  There is no instability in either shoulder noted.  She is been doing some strengthening exercises on her own not get relief the symptoms.  Taken anti-inflammatories.  At this time I will get an MRI of her left shoulder.  She is had the pain for greater than 6 months she is been doing strengthening exercises for greater than 4 months without relief the symptoms.  I will get an MRI of the left shoulder.  I did inject her left shoulder 1 cc Marcaine 1 cc Kenalog.  To try to get her some relief of symptoms we did the injection.  Follow-up after the MRI of the shoulder.  I will send her physical therapy for the scapular bursitis.Recommendation is for a steroid in injection in the shoulder. Risks and benefits of the procedure were explained. The patient verbalized understanding and did wish to proceed. After verbal consent was obtained we proceeded with injection. The shoulder was prepped with alcohol  betadine and the skin was anesthetized with cold spray. The shoulder was injected with a mixture of 1cc marcaine, and 1cc kenalog. A bandage was applied. The patient tolerated the injection well

## 2023-04-06 ENCOUNTER — OFFICE VISIT (OUTPATIENT)
Dept: FAMILY MEDICINE | Facility: CLINIC | Age: 58
End: 2023-04-06
Payer: MEDICARE

## 2023-04-06 VITALS
WEIGHT: 211.63 LBS | SYSTOLIC BLOOD PRESSURE: 128 MMHG | RESPIRATION RATE: 16 BRPM | HEIGHT: 67 IN | DIASTOLIC BLOOD PRESSURE: 80 MMHG | HEART RATE: 72 BPM | BODY MASS INDEX: 33.21 KG/M2 | TEMPERATURE: 98 F

## 2023-04-06 DIAGNOSIS — I10 HYPERTENSION, UNSPECIFIED TYPE: Primary | ICD-10-CM

## 2023-04-06 DIAGNOSIS — F33.1 MODERATE EPISODE OF RECURRENT MAJOR DEPRESSIVE DISORDER: ICD-10-CM

## 2023-04-06 DIAGNOSIS — I10 ESSENTIAL HYPERTENSION, BENIGN: ICD-10-CM

## 2023-04-06 DIAGNOSIS — R73.03 PREDIABETES: ICD-10-CM

## 2023-04-06 DIAGNOSIS — E55.9 VITAMIN D DEFICIENCY: ICD-10-CM

## 2023-04-06 DIAGNOSIS — Z79.899 ENCOUNTER FOR LONG-TERM (CURRENT) USE OF OTHER MEDICATIONS: ICD-10-CM

## 2023-04-06 DIAGNOSIS — J32.0 CHRONIC MAXILLARY SINUSITIS: ICD-10-CM

## 2023-04-06 DIAGNOSIS — R53.82 CHRONIC FATIGUE: ICD-10-CM

## 2023-04-06 LAB
25(OH)D3 SERPL-MCNC: 30 NG/ML
ALBUMIN SERPL BCP-MCNC: 3.6 G/DL (ref 3.5–5)
ALBUMIN/GLOB SERPL: 1 {RATIO}
ALP SERPL-CCNC: 100 U/L (ref 46–118)
ALT SERPL W P-5'-P-CCNC: 20 U/L (ref 13–56)
ANION GAP SERPL CALCULATED.3IONS-SCNC: 9 MMOL/L (ref 7–16)
AST SERPL W P-5'-P-CCNC: 15 U/L (ref 15–37)
BASOPHILS # BLD AUTO: 0.05 K/UL (ref 0–0.2)
BASOPHILS NFR BLD AUTO: 0.7 % (ref 0–1)
BILIRUB SERPL-MCNC: 1.1 MG/DL (ref ?–1.2)
BUN SERPL-MCNC: 7 MG/DL (ref 7–18)
BUN/CREAT SERPL: 8 (ref 6–20)
CALCIUM SERPL-MCNC: 9.1 MG/DL (ref 8.5–10.1)
CHLORIDE SERPL-SCNC: 106 MMOL/L (ref 98–107)
CHOLEST SERPL-MCNC: 166 MG/DL (ref 0–200)
CHOLEST/HDLC SERPL: 3 {RATIO}
CO2 SERPL-SCNC: 29 MMOL/L (ref 21–32)
CREAT SERPL-MCNC: 0.92 MG/DL (ref 0.55–1.02)
DIFFERENTIAL METHOD BLD: ABNORMAL
EGFR (NO RACE VARIABLE) (RUSH/TITUS): 73 ML/MIN/1.73M²
EOSINOPHIL # BLD AUTO: 0.07 K/UL (ref 0–0.5)
EOSINOPHIL NFR BLD AUTO: 1 % (ref 1–4)
ERYTHROCYTE [DISTWIDTH] IN BLOOD BY AUTOMATED COUNT: 13.8 % (ref 11.5–14.5)
EST. AVERAGE GLUCOSE BLD GHB EST-MCNC: 117 MG/DL
GLOBULIN SER-MCNC: 3.7 G/DL (ref 2–4)
GLUCOSE SERPL-MCNC: 112 MG/DL (ref 74–106)
HBA1C MFR BLD HPLC: 6.1 % (ref 4.5–6.6)
HCT VFR BLD AUTO: 44.9 % (ref 38–47)
HDLC SERPL-MCNC: 56 MG/DL (ref 40–60)
HGB BLD-MCNC: 14.4 G/DL (ref 12–16)
IMM GRANULOCYTES # BLD AUTO: 0.02 K/UL (ref 0–0.04)
IMM GRANULOCYTES NFR BLD: 0.3 % (ref 0–0.4)
LDLC SERPL CALC-MCNC: 99 MG/DL
LDLC/HDLC SERPL: 1.8 {RATIO}
LYMPHOCYTES # BLD AUTO: 3.02 K/UL (ref 1–4.8)
LYMPHOCYTES NFR BLD AUTO: 42.9 % (ref 27–41)
MCH RBC QN AUTO: 29.5 PG (ref 27–31)
MCHC RBC AUTO-ENTMCNC: 32.1 G/DL (ref 32–36)
MCV RBC AUTO: 92 FL (ref 80–96)
MONOCYTES # BLD AUTO: 0.57 K/UL (ref 0–0.8)
MONOCYTES NFR BLD AUTO: 8.1 % (ref 2–6)
MPC BLD CALC-MCNC: 9.9 FL (ref 9.4–12.4)
NEUTROPHILS # BLD AUTO: 3.31 K/UL (ref 1.8–7.7)
NEUTROPHILS NFR BLD AUTO: 47 % (ref 53–65)
NONHDLC SERPL-MCNC: 110 MG/DL
NRBC # BLD AUTO: 0 X10E3/UL
NRBC, AUTO (.00): 0 %
PLATELET # BLD AUTO: 374 K/UL (ref 150–400)
POTASSIUM SERPL-SCNC: 4.3 MMOL/L (ref 3.5–5.1)
PROT SERPL-MCNC: 7.3 G/DL (ref 6.4–8.2)
RBC # BLD AUTO: 4.88 M/UL (ref 4.2–5.4)
SODIUM SERPL-SCNC: 140 MMOL/L (ref 136–145)
TRIGL SERPL-MCNC: 54 MG/DL (ref 35–150)
VIT B12 SERPL-MCNC: 764 PG/ML (ref 193–986)
VLDLC SERPL-MCNC: 11 MG/DL
WBC # BLD AUTO: 7.04 K/UL (ref 4.5–11)

## 2023-04-06 PROCEDURE — 3074F PR MOST RECENT SYSTOLIC BLOOD PRESSURE < 130 MM HG: ICD-10-PCS | Mod: ,,, | Performed by: FAMILY MEDICINE

## 2023-04-06 PROCEDURE — 3079F DIAST BP 80-89 MM HG: CPT | Mod: ,,, | Performed by: FAMILY MEDICINE

## 2023-04-06 PROCEDURE — 83036 HEMOGLOBIN A1C: ICD-10-PCS | Mod: ,,, | Performed by: CLINICAL MEDICAL LABORATORY

## 2023-04-06 PROCEDURE — 82607 VITAMIN B-12: CPT | Mod: ,,, | Performed by: CLINICAL MEDICAL LABORATORY

## 2023-04-06 PROCEDURE — 96372 THER/PROPH/DIAG INJ SC/IM: CPT | Mod: ,,, | Performed by: FAMILY MEDICINE

## 2023-04-06 PROCEDURE — 80053 COMPREHEN METABOLIC PANEL: CPT | Mod: ,,, | Performed by: CLINICAL MEDICAL LABORATORY

## 2023-04-06 PROCEDURE — 3008F PR BODY MASS INDEX (BMI) DOCUMENTED: ICD-10-PCS | Mod: ,,, | Performed by: FAMILY MEDICINE

## 2023-04-06 PROCEDURE — 96372 PR INJECTION,THERAP/PROPH/DIAG2ST, IM OR SUBCUT: ICD-10-PCS | Mod: ,,, | Performed by: FAMILY MEDICINE

## 2023-04-06 PROCEDURE — 80061 LIPID PANEL: ICD-10-PCS | Mod: ,,, | Performed by: CLINICAL MEDICAL LABORATORY

## 2023-04-06 PROCEDURE — 82306 VITAMIN D 25 HYDROXY: CPT | Mod: ,,, | Performed by: CLINICAL MEDICAL LABORATORY

## 2023-04-06 PROCEDURE — 1159F MED LIST DOCD IN RCRD: CPT | Mod: ,,, | Performed by: FAMILY MEDICINE

## 2023-04-06 PROCEDURE — 1160F RVW MEDS BY RX/DR IN RCRD: CPT | Mod: ,,, | Performed by: FAMILY MEDICINE

## 2023-04-06 PROCEDURE — 83036 HEMOGLOBIN GLYCOSYLATED A1C: CPT | Mod: ,,, | Performed by: CLINICAL MEDICAL LABORATORY

## 2023-04-06 PROCEDURE — 3079F PR MOST RECENT DIASTOLIC BLOOD PRESSURE 80-89 MM HG: ICD-10-PCS | Mod: ,,, | Performed by: FAMILY MEDICINE

## 2023-04-06 PROCEDURE — 3044F PR MOST RECENT HEMOGLOBIN A1C LEVEL <7.0%: ICD-10-PCS | Mod: ,,, | Performed by: FAMILY MEDICINE

## 2023-04-06 PROCEDURE — 80053 COMPREHENSIVE METABOLIC PANEL: ICD-10-PCS | Mod: ,,, | Performed by: CLINICAL MEDICAL LABORATORY

## 2023-04-06 PROCEDURE — 80061 LIPID PANEL: CPT | Mod: ,,, | Performed by: CLINICAL MEDICAL LABORATORY

## 2023-04-06 PROCEDURE — 3074F SYST BP LT 130 MM HG: CPT | Mod: ,,, | Performed by: FAMILY MEDICINE

## 2023-04-06 PROCEDURE — 99214 PR OFFICE/OUTPT VISIT, EST, LEVL IV, 30-39 MIN: ICD-10-PCS | Mod: 25,,, | Performed by: FAMILY MEDICINE

## 2023-04-06 PROCEDURE — 1160F PR REVIEW ALL MEDS BY PRESCRIBER/CLIN PHARMACIST DOCUMENTED: ICD-10-PCS | Mod: ,,, | Performed by: FAMILY MEDICINE

## 2023-04-06 PROCEDURE — 3044F HG A1C LEVEL LT 7.0%: CPT | Mod: ,,, | Performed by: FAMILY MEDICINE

## 2023-04-06 PROCEDURE — 85025 COMPLETE CBC W/AUTO DIFF WBC: CPT | Mod: ,,, | Performed by: CLINICAL MEDICAL LABORATORY

## 2023-04-06 PROCEDURE — 82607 VITAMIN B12: ICD-10-PCS | Mod: ,,, | Performed by: CLINICAL MEDICAL LABORATORY

## 2023-04-06 PROCEDURE — 85025 CBC WITH DIFFERENTIAL: ICD-10-PCS | Mod: ,,, | Performed by: CLINICAL MEDICAL LABORATORY

## 2023-04-06 PROCEDURE — 99214 OFFICE O/P EST MOD 30 MIN: CPT | Mod: 25,,, | Performed by: FAMILY MEDICINE

## 2023-04-06 PROCEDURE — 3008F BODY MASS INDEX DOCD: CPT | Mod: ,,, | Performed by: FAMILY MEDICINE

## 2023-04-06 PROCEDURE — 1159F PR MEDICATION LIST DOCUMENTED IN MEDICAL RECORD: ICD-10-PCS | Mod: ,,, | Performed by: FAMILY MEDICINE

## 2023-04-06 PROCEDURE — 82306 VITAMIN D: ICD-10-PCS | Mod: ,,, | Performed by: CLINICAL MEDICAL LABORATORY

## 2023-04-06 RX ORDER — ALBUTEROL SULFATE 90 UG/1
2 AEROSOL, METERED RESPIRATORY (INHALATION) EVERY 4 HOURS PRN
Qty: 54 G | Refills: 3 | Status: SHIPPED | OUTPATIENT
Start: 2023-04-06

## 2023-04-06 RX ORDER — AMOXICILLIN 875 MG/1
875 TABLET, FILM COATED ORAL 2 TIMES DAILY
Qty: 20 TABLET | Refills: 0 | Status: SHIPPED | OUTPATIENT
Start: 2023-04-06 | End: 2023-04-16

## 2023-04-06 RX ORDER — DEXAMETHASONE SODIUM PHOSPHATE 4 MG/ML
4 INJECTION, SOLUTION INTRA-ARTICULAR; INTRALESIONAL; INTRAMUSCULAR; INTRAVENOUS; SOFT TISSUE ONCE
Status: COMPLETED | OUTPATIENT
Start: 2023-04-06 | End: 2023-04-06

## 2023-04-06 RX ORDER — HYDRALAZINE HYDROCHLORIDE 10 MG/1
10 TABLET, FILM COATED ORAL 2 TIMES DAILY
Qty: 180 TABLET | Refills: 3 | Status: SHIPPED | OUTPATIENT
Start: 2023-04-06 | End: 2024-02-20 | Stop reason: SDUPTHER

## 2023-04-06 RX ORDER — PROGESTERONE 200 MG/1
1 CAPSULE ORAL NIGHTLY
COMMUNITY
Start: 2023-03-13

## 2023-04-06 RX ADMIN — DEXAMETHASONE SODIUM PHOSPHATE 4 MG: 4 INJECTION, SOLUTION INTRA-ARTICULAR; INTRALESIONAL; INTRAMUSCULAR; INTRAVENOUS; SOFT TISSUE at 09:04

## 2023-04-06 NOTE — PROGRESS NOTES
Subjective     Patient ID: Sheri Ho is a 57 y.o. female.    Chief Complaint: Follow-up, Sinusitis, and Abdominal Pain    Has chronic abd pain and constipation.  Sees GI, next appt June.  Last year had EGD and gastric emptying study and u/s.  Cscope 2016--normal.  Also having sinus flare up.  Needs refills and labs.    Review of Systems   Constitutional:  Negative for appetite change, chills, fatigue, fever and unexpected weight change.   HENT:  Positive for nasal congestion and sinus pressure/congestion.    Respiratory:  Negative for cough and shortness of breath.    Cardiovascular:  Negative for chest pain and leg swelling.   Gastrointestinal:  Negative for abdominal pain.   Musculoskeletal:  Negative for arthralgias.   Integumentary:  Negative for rash.   Neurological:  Negative for weakness.   Psychiatric/Behavioral:  The patient is not nervous/anxious.         Objective     Physical Exam  Constitutional:       General: She is not in acute distress.     Appearance: Normal appearance.   HENT:      Nose: Congestion present. No rhinorrhea.      Mouth/Throat:      Pharynx: No oropharyngeal exudate or posterior oropharyngeal erythema.   Cardiovascular:      Rate and Rhythm: Normal rate and regular rhythm.      Heart sounds: Normal heart sounds.   Pulmonary:      Breath sounds: Normal breath sounds.   Abdominal:      General: Abdomen is flat.      Palpations: Abdomen is soft.   Skin:     General: Skin is warm and dry.   Neurological:      Mental Status: She is alert. Mental status is at baseline.   Psychiatric:         Mood and Affect: Mood normal.         Behavior: Behavior normal.         Thought Content: Thought content normal.         Judgment: Judgment normal.          Assessment and Plan   1. Hypertension, unspecified type  hydrALAZINE (APRESOLINE) 10 MG tablet      2. Chronic maxillary sinusitis  dexAMETHasone injection 4 mg    CBC Auto Differential    CBC Auto Differential      3. Encounter for  long-term (current) use of other medications  Comprehensive Metabolic Panel    CBC Auto Differential    Hemoglobin A1C    Lipid Panel    Vitamin D    Vitamin B12    Comprehensive Metabolic Panel    CBC Auto Differential    Hemoglobin A1C    Lipid Panel    Vitamin D    Vitamin B12      4. Prediabetes  Comprehensive Metabolic Panel    Hemoglobin A1C    Comprehensive Metabolic Panel    Hemoglobin A1C      5. Chronic fatigue  Comprehensive Metabolic Panel    CBC Auto Differential    Vitamin B12    Comprehensive Metabolic Panel    CBC Auto Differential    Vitamin B12      6. Vitamin D deficiency  Vitamin D    Vitamin D      7. Essential hypertension, benign  Comprehensive Metabolic Panel    Lipid Panel    Comprehensive Metabolic Panel    Lipid Panel      8. Moderate episode of recurrent major depressive disorder            Problem List Items Addressed This Visit    None      UTD bebe and cscope  Pt will make her own eye exam  Got shingrix x 2  Send hydralazine to [facility], albuterol to [facility]  Decadron, amoxil to [facility]

## 2023-04-11 ENCOUNTER — CLINICAL SUPPORT (OUTPATIENT)
Dept: REHABILITATION | Facility: HOSPITAL | Age: 58
End: 2023-04-11
Attending: ORTHOPAEDIC SURGERY
Payer: MEDICARE

## 2023-04-11 DIAGNOSIS — M25.512 BILATERAL SHOULDER PAIN, UNSPECIFIED CHRONICITY: Primary | ICD-10-CM

## 2023-04-11 DIAGNOSIS — M25.611 DECREASED ROM OF RIGHT SHOULDER: ICD-10-CM

## 2023-04-11 DIAGNOSIS — S46.812D INFRASPINATUS TENDON TEAR, LEFT, SUBSEQUENT ENCOUNTER: ICD-10-CM

## 2023-04-11 DIAGNOSIS — M25.511 BILATERAL SHOULDER PAIN, UNSPECIFIED CHRONICITY: Primary | ICD-10-CM

## 2023-04-11 DIAGNOSIS — M25.612 DECREASED ROM OF LEFT SHOULDER: ICD-10-CM

## 2023-04-11 PROCEDURE — 97161 PT EVAL LOW COMPLEX 20 MIN: CPT

## 2023-04-11 NOTE — PLAN OF CARE
[facility]   Physical Therapy Initial Evaluation    Name: Sheri Ho  Clinic Number: [id number]    Therapy Diagnosis:   Encounter Diagnosis   Name Primary?    Bilateral shoulder pain, unspecified chronicity      Physician: Cain Treviño MD    Physician Orders: PT Eval and Treat   Medical Diagnosis from Referral: see above  Evaluation Date: 4/11/2023  Authorization Period Expiration: 4/11/2023 - evaluation only approved  Plan of Care Expiration: 6/9/2023    Visit # / Visits authorized: 1/ 1 - evaluation only approved    Time In: 8:43 am  Time Out: 9:22 am  Total Appointment Time (timed & untimed codes): 39 minutes    Precautions: Standard    Subjective   Date of onset: chronic  History of current condition - Sheri reports: both shoulders hurt but left worse - h/o 2 surgeries on right shoulder most recent being 11/30/21 - just had MRI on left shoulder which showed some degenerative changes and partial tearing of infraspinatus - right shoulder pain is intermittent but left is constant - hurts trying to push herself up from a chair - has to use a bench in the tub because she can't pull herself up out of the tub - her  helps her get on and off the bench but she can bathe herself - she can drive herself     Medical History:   Past Medical History:   Diagnosis Date    Arthritis     Esophageal dysphagia 4/29/2021    Gastroparesis 7/29/2022    GERD (gastroesophageal reflux disease)     Mitral insufficiency     Panic attacks     SOB (shortness of breath)        Surgical History:   Sheri Ho  has a past surgical history that includes Cholecystectomy; Colonoscopy; Esophagogastroduodenoscopy; Hysterectomy; Shoulder arthroscopy; Cystoscopy; Rotator cuff repair; Shoulder arthroscopy (Right, 11/30/2021); Arthroscopic repair of rotator cuff of shoulder (Right, 11/30/2021); Oophorectomy; and Lipoma resection (N/A, 09/02/2022).    Medications:   Sheri has a current medication list which includes the  following prescription(s): albuterol, albuterol sulfate, amlodipine, amoxicillin, aspirin, rexulti, budesonide-formoterol 160-4.5 mcg, bupropion, cetirizine, cyclobenzaprine, escitalopram oxalate, hydralazine, hydrochlorothiazide, hydrocodone-acetaminophen, hydroxyzine hcl, ibuprofen, linzess, loteprednol, mirabegron, multivitamin, mupirocin, pantoprazole, progesterone, propranolol, and trazodone, and the following Facility-Administered Medications: sodium chloride 0.9%.    Allergies:   Review of patient's allergies indicates:   Allergen Reactions    Codeine Nausea And Vomiting and Swelling        Imaging, MRI studies: left shoulder - results under media section    Prior Therapy: none for the left shoulder  Social History:   lives with their family  Occupation: on disability  Prior Level of Function: independent   Current Level of Function: modified independent - requires assistance getting on and off her tub bench - requires assistance with household chores    Pain:  Current 6/10, worst 9/10, best 4/10   Location: bilateral shoulder   Description: Aching, Throbbing, Sharp, Shooting, and Variable  Aggravating Factors: Night Time and Getting out of bed/chair  Easing Factors: pain medication, ice, and 800 mg ibuprofen    Pts goals: decrease pain in shoulders    Objective     Posture: rounded shoulders yes, forward head yes, increased kyphotic curve no, decreased lordotic curve no    Range of motion  Motion Right  Left    Shoulder flexion 120 degrees - stop due to p! 120 degrees - stop due to p!   Shoulder extension 47 degrees  55 degrees    Shoulder abduction 90 degrees - stop due to p! 94 degrees - stop due to p!   Shoulder internal rotation L1-2 reaching behind back L1-2 reaching behind back   Shoulder external rotation 42 degrees in adduction  44 degrees in adduction        MANUAL MUSCLE TEST  Muscle Right  Left    Shoulder flexion MMT strength: 4/5 MMT strength: 4-/5   Shoulder extension MMT strength: 4/5  MMT strength: 4/5   Shoulder abduction MMT strength: 4-/5 MMT strength: 4-/5   Shoulder internal rotation MMT strength: 4+/5 MMT strength: 4+/5   Shoulder external rotation MMT strength: 4/5 MMT strength: 4-/5     Functional ability:  Dressing: AMB PT LEVEL OF ASSISTANCE: independent  Driving: AMB PT LEVEL OF ASSISTANCE: independent  Overhead activity: AMB PT LEVEL OF ASSISTANCE: mod A  Work/hobbies: AMB PT LEVEL OF ASSISTANCE: does not work       Clinical test:  Apprehension test: negative  Neer's test: positive  O'brianne's test: negative  Drop arm test: negative  Empty can test: positive  Hawkin's yo test: positive      Palpation: no focal or localized tenderness - general tenderness all around shoulder    Limitation/Restriction for FOTO Shoulder Survey    Therapist reviewed FOTO scores for Sheri Ho on 4/11/2023.   FOTO documents entered into EP[employer] - see Media section.    Intake Score: 38         TREATMENT     Total Treatment time (time-based codes) separate from Evaluation: 8 minutes    Sheri received the treatments listed below:  Corner stretch 4 x 15 second hold, bilateral external rotation w/ yellow theraband x 10, scapular retraction/extension w/ green theraband x 10  Instructed to ice left shoulder 2-3 times per day    Home Exercises and Patient Education Provided    Education provided:   - evaluation results, plan of care and home exercise program     Written Home Exercises Provided: yes.  Exercises were reviewed and Sheri was able to demonstrate them prior to the end of the session.  Sheri demonstrated good  understanding of the education provided.     See EMR under Patient Instructions for exercises provided 4/11/2023.    Assessment   Sheri is a 57 y.o. female referred to outpatient Physical Therapy with a medical diagnosis of bilateral shoulder pain. Pt presents with complaints of bilateral shoulder pain. She has had 2 rotator cuff repairs on the right shoulder and just had an MRI on the left  which showed a partial tear in the infraspinatus. She has decreased range of motion in all planes. Flexion is more limited by pain. She has some decreased strength and scapular stability on both sides. She is able to drive but has to hold the bottom of the steering wheel. Her  helps her get on and off of her bath bench but then she can bathe independently. She is not able to perform much overhead activity. She wakes several times in the night due to shoulder pain. Will work to restore range of motion, improve scapular stability and strength, improve function and decrease pain.    Pt prognosis is Good.   Pt will benefit from skilled outpatient Physical Therapy to address the deficits stated above and in the chart below, provide pt/family education, and to maximize pt's level of independence.     Plan of care discussed with patient: Yes  Pt's spiritual, cultural and educational needs considered and patient is agreeable to the plan of care and goals as stated below:     Anticipated Barriers for therapy: none      Short Term Goals: 4 weeks  Patient will be independent with home exercise program to facilitate carryover between visits.  Patient will have passive range of motion as follows - 140 degrees flexion, 50 degrees external rotation and internal rotation.  Patient will be independent getting on and off her tub bench.  Patient will be able to sleep all night in bed without waking more than once due to either shoulder.    Long Term Goals: 8 weeks  Patient will have active range of motion without pain as follows - 140 degrees flexion, 60 degrees external rotation and functional internal rotation to T10 for reaching overhead, behind head and behind back for dressing, grooming and hygiene.  Patient will have 4+/5 strength bilateral shoulders/upper extremities to perform household chores and work related tasks.  Patient will place 2# dumbbell on head height shelf without hiking or pain left shoulder.  Patient will  return to performing all household chores with pain < 2/10 in either shoulder.     Plan   Plan of care Certification: 4/11/2023 to 6/9/2023.    Outpatient Physical Therapy 2 times weekly for 8 weeks to include the following interventions: Electrical Stimulation IFC/premod/hivolt as needed, Iontophoresis (with Dexamethasone), Manual Therapy, Moist Heat/ Ice, Neuromuscular Re-ed, Patient Education, Therapeutic Activities, Therapeutic Exercise, and Ultrasound.     AVEL LION, PT

## 2023-04-13 ENCOUNTER — TELEPHONE (OUTPATIENT)
Dept: ORTHOPEDICS | Facility: CLINIC | Age: 58
End: 2023-04-13
Payer: MEDICARE

## 2023-04-13 NOTE — TELEPHONE ENCOUNTER
Called patient with MRI results. She has a partial thickness rotator cuff tear. She did get good results from her injection. She will call us back when she wants another shot or to discuss surgery.

## 2023-05-03 ENCOUNTER — CLINICAL SUPPORT (OUTPATIENT)
Dept: REHABILITATION | Facility: HOSPITAL | Age: 58
End: 2023-05-03
Attending: ORTHOPAEDIC SURGERY
Payer: MEDICARE

## 2023-05-03 DIAGNOSIS — M25.511 BILATERAL SHOULDER PAIN, UNSPECIFIED CHRONICITY: Primary | ICD-10-CM

## 2023-05-03 DIAGNOSIS — M25.512 BILATERAL SHOULDER PAIN, UNSPECIFIED CHRONICITY: Primary | ICD-10-CM

## 2023-05-03 DIAGNOSIS — M25.611 DECREASED ROM OF RIGHT SHOULDER: ICD-10-CM

## 2023-05-03 DIAGNOSIS — M25.612 DECREASED ROM OF LEFT SHOULDER: ICD-10-CM

## 2023-05-03 DIAGNOSIS — S46.812D INFRASPINATUS TENDON TEAR, LEFT, SUBSEQUENT ENCOUNTER: ICD-10-CM

## 2023-05-03 PROCEDURE — 97110 THERAPEUTIC EXERCISES: CPT

## 2023-05-03 PROCEDURE — 97530 THERAPEUTIC ACTIVITIES: CPT

## 2023-05-03 PROCEDURE — 97112 NEUROMUSCULAR REEDUCATION: CPT

## 2023-05-03 PROCEDURE — 97014 ELECTRIC STIMULATION THERAPY: CPT

## 2023-05-03 NOTE — PROGRESS NOTES
Physical Therapy Treatment Note     Name: Sheri Ho  Clinic Number: [id number]    Therapy Diagnosis:   Encounter Diagnoses   Name Primary?    Bilateral shoulder pain, unspecified chronicity Yes    Decreased ROM of left shoulder     Decreased ROM of right shoulder     Infraspinatus tendon tear, left, subsequent encounter      Physician: Cain Treviño MD    Visit Date: 5/3/2023    Physician Orders: PT Eval and Treat   Medical Diagnosis from Referral: see above  Evaluation Date: 4/11/2023  Authorization Period Expiration: 6/9/2023   Plan of Care Expiration: 6/9/2023     Visit # / Visits authorized: 2/17     PTA Visit #:     Time In: 9:31 am  Time Out: 10:28 am  Total Billable Time: 56 minutes    Precautions: Standard  Functional Level Prior to Evaluation: independent     Subjective     Pt reports: she forgot about having appointments last week then had to cancel Monday because she was sick  She was compliant with home exercise program.  Response to previous treatment: no complaints from eval  Functional change: none    Pain: 3/10  Location: bilateral shoulders     Objective     Sheri received therapeutic exercises to develop strength, endurance, ROM, flexibility, and posture for 11 minutes including:  Upper body ergometer x 5 minutes  Pulleys x 3 minutes  Corner stretch 4 x 20 second hold     Sheri received the following manual therapy techniques: Joint mobilizations, Myofacial release, Soft tissue Mobilization, and passive stretching were applied to the:       Sheri participated in neuromuscular re-education activities to improve: Coordination, Proprioception, Posture, and scapular stability for 25 minutes. The following activities were included:  Prone scapular retraction  Scapular retraction/extension - red 3 second hold x 30  Bilateral external rotation - red 3 second hold x 30   Rows 3 plates x 30  Unilateral internal rotation - red x 30 each arm    Sheri participated in dynamic functional therapeutic  activities to improve functional performance for 5 minutes, including:  Cane flexion on rail x 20    Sheri received the following direct contact modalities after being cleared for contraindications:     Sheri received the following supervised modalities after being cleared for contradictions: IFC Electrical Stimulation:  Sheri received IFC Electrical Stimulation for pain control applied to the left shoulder. Pt received stimulation at 40% scan at a frequency of  for 15 minutes. Sheri tolderated treatment well without any adverse effects.      Sheri received hot pack for 0 minutes to     Sheri received cold pack for 0 minutes to       Home Exercises Provided and Patient Education Provided     Education provided: importance of compliance with home exercise program     Written Home Exercises Provided: Patient instructed to cont prior HEP.  Exercises were reviewed and Sheri was able to demonstrate them prior to the end of the session.  Sheri demonstrated good  understanding of the education provided.     See EMR under Patient Instructions for exercises provided prior visit.    Assessment   Evaluation assessment:  Sheri is a 57 y.o. female referred to outpatient Physical Therapy with a medical diagnosis of bilateral shoulder pain. Pt presents with complaints of bilateral shoulder pain. She has had 2 rotator cuff repairs on the right shoulder and just had an MRI on the left which showed a partial tear in the infraspinatus. She has decreased range of motion in all planes. Flexion is more limited by pain. She has some decreased strength and scapular stability on both sides. She is able to drive but has to hold the bottom of the steering wheel. Her  helps her get on and off of her bath bench but then she can bathe independently. She is not able to perform much overhead activity. She wakes several times in the night due to shoulder pain. Will work to restore range of motion, improve scapular stability and strength,  improve function and decrease pain.     Current assessment:  Sheri arrived for first visit following evaluation. She states her right shoulder is feeling better but still having some pain in the left shoulder. She was able to complete the above listed exercises with minimal complaints. Ended treatment with IFC to left shoulder with good relief. Will continue to progress as tolerated.    Sheri Is progressing well towards her goals.   Pt prognosis is Good.     Pt will continue to benefit from skilled outpatient physical therapy to address the deficits listed in the problem list box on initial evaluation, provide pt/family education and to maximize pt's level of independence in the home and community environment.     Pt's spiritual, cultural and educational needs considered and pt agreeable to plan of care and goals.     Anticipated barriers to physical therapy: partial tear in infraspinatus left shoulder    Goals:    Short Term Goals: 4 weeks  Patient will be independent with home exercise program to facilitate carryover between visits.  Patient will have passive range of motion as follows - 140 degrees flexion, 50 degrees external rotation and internal rotation.  Patient will be independent getting on and off her tub bench.  Patient will be able to sleep all night in bed without waking more than once due to either shoulder.     Long Term Goals: 8 weeks  Patient will have active range of motion without pain as follows - 140 degrees flexion, 60 degrees external rotation and functional internal rotation to T10 for reaching overhead, behind head and behind back for dressing, grooming and hygiene.  Patient will have 4+/5 strength bilateral shoulders/upper extremities to perform household chores and work related tasks.  Patient will place 2# dumbbell on head height shelf without hiking or pain left shoulder.  Patient will return to performing all household chores with pain < 2/10 in either shoulder.     Plan     Plan of care  Certification: 4/11/2023 to 6/9/2023.     Outpatient Physical Therapy 2 times weekly for 8 weeks to include the following interventions: Electrical Stimulation IFC/premod/hivolt as needed, Iontophoresis (with Dexamethasone), Manual Therapy, Moist Heat/ Ice, Neuromuscular Re-ed, Patient Education, Therapeutic Activities, Therapeutic Exercise, and Ultrasound.    AVEL LION, PT  5/3/2023

## 2023-05-26 RX ORDER — AMLODIPINE BESYLATE 5 MG/1
TABLET ORAL
Qty: 45 TABLET | Refills: 3 | Status: SHIPPED | OUTPATIENT
Start: 2023-05-26 | End: 2023-10-30

## 2023-06-06 ENCOUNTER — OFFICE VISIT (OUTPATIENT)
Dept: SURGERY | Facility: CLINIC | Age: 58
End: 2023-06-06
Payer: MEDICARE

## 2023-06-06 DIAGNOSIS — R20.0 NUMBNESS AND TINGLING IN LEFT HAND: ICD-10-CM

## 2023-06-06 DIAGNOSIS — M25.512 LEFT SHOULDER PAIN, UNSPECIFIED CHRONICITY: ICD-10-CM

## 2023-06-06 DIAGNOSIS — R20.2 NUMBNESS AND TINGLING IN LEFT HAND: ICD-10-CM

## 2023-06-06 PROCEDURE — 99213 PR OFFICE/OUTPT VISIT, EST, LEVL III, 20-29 MIN: ICD-10-PCS | Mod: S$PBB,,, | Performed by: STUDENT IN AN ORGANIZED HEALTH CARE EDUCATION/TRAINING PROGRAM

## 2023-06-06 PROCEDURE — 1159F PR MEDICATION LIST DOCUMENTED IN MEDICAL RECORD: ICD-10-PCS | Mod: CPTII,,, | Performed by: STUDENT IN AN ORGANIZED HEALTH CARE EDUCATION/TRAINING PROGRAM

## 2023-06-06 PROCEDURE — 99213 OFFICE O/P EST LOW 20 MIN: CPT | Mod: S$PBB,,, | Performed by: STUDENT IN AN ORGANIZED HEALTH CARE EDUCATION/TRAINING PROGRAM

## 2023-06-06 PROCEDURE — 3044F HG A1C LEVEL LT 7.0%: CPT | Mod: CPTII,,, | Performed by: STUDENT IN AN ORGANIZED HEALTH CARE EDUCATION/TRAINING PROGRAM

## 2023-06-06 PROCEDURE — 99214 OFFICE O/P EST MOD 30 MIN: CPT | Mod: PBBFAC | Performed by: STUDENT IN AN ORGANIZED HEALTH CARE EDUCATION/TRAINING PROGRAM

## 2023-06-06 PROCEDURE — 1159F MED LIST DOCD IN RCRD: CPT | Mod: CPTII,,, | Performed by: STUDENT IN AN ORGANIZED HEALTH CARE EDUCATION/TRAINING PROGRAM

## 2023-06-06 PROCEDURE — 3044F PR MOST RECENT HEMOGLOBIN A1C LEVEL <7.0%: ICD-10-PCS | Mod: CPTII,,, | Performed by: STUDENT IN AN ORGANIZED HEALTH CARE EDUCATION/TRAINING PROGRAM

## 2023-06-06 NOTE — PROGRESS NOTES
History & Physical    SUBJECTIVE:     History of Present Illness:  58-year-old  female presents to the clinic for evaluation for some left shoulder pain with radiation to her left hand.  Patient underwent a left shoulder/posterior back excision of a lipoma in September of 2022 and was doing well postoperatively.  Patient does have shoulder issues and was told by Orthopedic surgery that she has a tear in her rotator cuff and has had multiple procedures on on bilateral shoulders.  Patient states she is developed pain in the left back of her shoulder on top of her shoulder blade and radiates to her left arm into her left elbow as well as radiates to her hand.  She has developed some numbness and tingling in her 4th and 5th fingers on the left side.  She has had imaging of her C-spine in the past and was told she would arthritis but has never had an MRI of the neck.  Denies any chest pain/shortness of breath, nausea/vomiting/diarrhea, fever/chills.  Chief Complaint   Patient presents with    Other     Pain at previous incision site       Review of patient's allergies indicates:   Allergen Reactions    Codeine Nausea And Vomiting and Swelling       Current Outpatient Medications   Medication Sig Dispense Refill    albuterol (PROVENTIL/VENTOLIN HFA) 90 mcg/actuation inhaler Inhale 2 puffs into the lungs every 4 (four) hours as needed for Wheezing. 54 g 3    albuterol sulfate 90 mcg/actuation aebs Inhale 180 mcg into the lungs every 6 (six) hours as needed.      amLODIPine (NORVASC) 5 MG tablet TAKE 1/2 TABLET ONE TIME DAILY 45 tablet 3    aspirin (ECOTRIN) 81 MG EC tablet Take 81 mg by mouth once daily.      brexpiprazole (REXULTI) 1 mg Tab One a day for depression 90 tablet 3    budesonide-formoterol 160-4.5 mcg (SYMBICORT) 160-4.5 mcg/actuation HFAA Inhale 2 puffs into the lungs 2 (two) times a day. 30.6 g 3    buPROPion (WELLBUTRIN SR) 100 MG TBSR 12 hr tablet TAKE 1 TABLET ONE TIME DAILY 90 tablet 3     cetirizine (ZYRTEC) 10 MG tablet Take 1 tablet (10 mg total) by mouth once daily. 90 tablet 3    cyclobenzaprine (FLEXERIL) 10 MG tablet Take 10 mg by mouth 3 (three) times daily.      EScitalopram oxalate (LEXAPRO) 20 MG tablet TAKE 1 TABLET ONE TIME DAILY 90 tablet 3    hydrALAZINE (APRESOLINE) 10 MG tablet Take 1 tablet (10 mg total) by mouth 2 (two) times daily. 180 tablet 3    hydroCHLOROthiazide (HYDRODIURIL) 12.5 MG Tab TAKE 1 TABLET ONE TIME DAILY 90 tablet 0    HYDROcodone-acetaminophen (NORCO)  mg per tablet Take 1 tablet by mouth every 8 (eight) hours as needed.      hydrOXYzine HCL (ATARAX) 10 MG Tab 1-2 tabs Q6hrs prn for nerves, itching, insomnia, or nausea 120 tablet 5    ibuprofen (ADVIL,MOTRIN) 800 MG tablet Take 1 tablet (800 mg total) by mouth 2 (two) times a day. 180 tablet 1    LINZESS 145 mcg Cap capsule TAKE 1 CAPSULE DAILY BEFORE BREAKFAST. 90 capsule 3    loteprednol (LOTEMAX) 0.5 % ophthalmic suspension 1 drop. As directed      MIRABEGRON ORAL Take by mouth.      multivitamin (MULTIPLE VITAMINS ORAL) Take 1 tablet by mouth once daily. OTC: w/ Iron & Folic Acid      mupirocin (BACTROBAN) 2 % ointment Apply topically once daily. 30 g 12    pantoprazole (PROTONIX) 40 MG tablet TAKE 1 TABLET ONE TIME DAILY 90 tablet 3    progesterone (PROMETRIUM) 200 MG capsule Take 1 capsule by mouth every evening.      propranoloL (INDERAL) 40 MG tablet TAKE 1 TABLET TWICE DAILY 180 tablet 3    traZODone (DESYREL) 50 MG tablet Take 50 mg by mouth nightly as needed.       No current facility-administered medications for this visit.     Facility-Administered Medications Ordered in Other Visits   Medication Dose Route Frequency Provider Last Rate Last Admin    0.9%  NaCl infusion   Intravenous Continuous Cain Treviño  mL/hr at 09/02/22 1002 New Bag at 09/02/22 1002       Past Medical History:   Diagnosis Date    Arthritis     Esophageal dysphagia 4/29/2021    Gastroparesis 7/29/2022    GERD  (gastroesophageal reflux disease)     Mitral insufficiency     Panic attacks     SOB (shortness of breath)      Past Surgical History:   Procedure Laterality Date    ARTHROSCOPIC REPAIR OF ROTATOR CUFF OF SHOULDER Right 11/30/2021    Procedure: REPAIR, ROTATOR CUFF, ARTHROSCOPIC;  Surgeon: Cain Treviño MD;  Location: [facility] OR;  Service: Orthopedics;  Laterality: Right;    CHOLECYSTECTOMY      COLONOSCOPY      CYSTOSCOPY      ESOPHAGOGASTRODUODENOSCOPY      HYSTERECTOMY      LIPOMA RESECTION N/A 09/02/2022    Procedure: EXCISION, BACK LIPOMA;  Surgeon: Zane Crowe DO;  Location: [facility] OR;  Service: General;  Laterality: N/A;    OOPHORECTOMY      ROTATOR CUFF REPAIR      SHOULDER ARTHROSCOPY      SHOULDER ARTHROSCOPY Right 11/30/2021    Procedure: ARTHROSCOPY, SHOULDER;  Surgeon: Cain Treviño MD;  Location: [facility];  Service: Orthopedics;  Laterality: Right;     Family History   Problem Relation Age of Onset    Hypertension Mother     Diabetes Father     Hypertension Father     Heart disease Father     Heart attack Father     Hypertension Sister     Hypertension Maternal Grandmother     Breast cancer Cousin      Social History     Tobacco Use    Smoking status: Never    Smokeless tobacco: Never   Substance Use Topics    Alcohol use: Never    Drug use: Never        Review of Systems:  Review of Systems   Constitutional: Negative.  Negative for fatigue and unexpected weight change.   HENT: Negative.  Negative for trouble swallowing.    Eyes: Negative.    Respiratory: Negative.  Negative for chest tightness and shortness of breath.    Cardiovascular: Negative.  Negative for chest pain.   Gastrointestinal: Negative.  Negative for abdominal pain, blood in stool and nausea.   Endocrine: Negative.    Genitourinary: Negative.  Negative for hematuria.   Musculoskeletal:  Positive for arthralgias and myalgias. Negative for back pain.   Neurological:  Positive for numbness. Negative for  dizziness, speech difficulty, weakness and light-headedness.   Psychiatric/Behavioral: Negative.  Negative for agitation and behavioral problems.      OBJECTIVE:     Vital Signs (Most Recent)              Physical Exam:  Physical Exam  Constitutional:       General: She is not in acute distress.     Appearance: Normal appearance.   HENT:      Head: Normocephalic.   Cardiovascular:      Rate and Rhythm: Normal rate.   Pulmonary:      Effort: Pulmonary effort is normal. No respiratory distress.   Abdominal:      General: There is no distension.      Tenderness: There is no abdominal tenderness.   Musculoskeletal:         General: Normal range of motion.      Comments: No winging scapula on the left; increased range of motion of the left as compared to the right; no weakness.   Skin:     General: Skin is warm.      Coloration: Skin is not jaundiced.      Comments: Incision clean dry and intact and well healed; no signs of infection or drainage   Neurological:      General: No focal deficit present.      Mental Status: She is alert and oriented to person, place, and time.      Cranial Nerves: No cranial nerve deficit.     ASSESSMENT/PLAN:     Left shoulder pain, radiculopathy, left hand paresthesias    PLAN:Plan     Do not suspect any injury from previous excision of lipoma.  Suspect possible nerve impingement from possible herniated disc.  We will refer to ortho spine; we will leave any imaging up to them and their expertise.

## 2023-06-09 DIAGNOSIS — Z71.89 COMPLEX CARE COORDINATION: ICD-10-CM

## 2023-06-29 ENCOUNTER — OFFICE VISIT (OUTPATIENT)
Dept: GASTROENTEROLOGY | Facility: CLINIC | Age: 58
End: 2023-06-29
Payer: MEDICARE

## 2023-06-29 VITALS
BODY MASS INDEX: 35.99 KG/M2 | HEIGHT: 65 IN | DIASTOLIC BLOOD PRESSURE: 72 MMHG | SYSTOLIC BLOOD PRESSURE: 138 MMHG | HEART RATE: 69 BPM | RESPIRATION RATE: 18 BRPM | WEIGHT: 216 LBS

## 2023-06-29 DIAGNOSIS — K21.9 GERD WITHOUT ESOPHAGITIS: Primary | ICD-10-CM

## 2023-06-29 DIAGNOSIS — K74.00 LIVER FIBROSIS: ICD-10-CM

## 2023-06-29 DIAGNOSIS — K59.00 CONSTIPATION, UNSPECIFIED CONSTIPATION TYPE: ICD-10-CM

## 2023-06-29 DIAGNOSIS — R13.10 DYSPHAGIA, UNSPECIFIED TYPE: ICD-10-CM

## 2023-06-29 PROCEDURE — 3044F PR MOST RECENT HEMOGLOBIN A1C LEVEL <7.0%: ICD-10-PCS | Mod: CPTII,,, | Performed by: NURSE PRACTITIONER

## 2023-06-29 PROCEDURE — 99215 OFFICE O/P EST HI 40 MIN: CPT | Mod: PBBFAC | Performed by: NURSE PRACTITIONER

## 2023-06-29 PROCEDURE — 3075F SYST BP GE 130 - 139MM HG: CPT | Mod: CPTII,,, | Performed by: NURSE PRACTITIONER

## 2023-06-29 PROCEDURE — 1160F RVW MEDS BY RX/DR IN RCRD: CPT | Mod: CPTII,,, | Performed by: NURSE PRACTITIONER

## 2023-06-29 PROCEDURE — 3044F HG A1C LEVEL LT 7.0%: CPT | Mod: CPTII,,, | Performed by: NURSE PRACTITIONER

## 2023-06-29 PROCEDURE — 1159F MED LIST DOCD IN RCRD: CPT | Mod: CPTII,,, | Performed by: NURSE PRACTITIONER

## 2023-06-29 PROCEDURE — 3008F PR BODY MASS INDEX (BMI) DOCUMENTED: ICD-10-PCS | Mod: CPTII,,, | Performed by: NURSE PRACTITIONER

## 2023-06-29 PROCEDURE — 3008F BODY MASS INDEX DOCD: CPT | Mod: CPTII,,, | Performed by: NURSE PRACTITIONER

## 2023-06-29 PROCEDURE — 3078F PR MOST RECENT DIASTOLIC BLOOD PRESSURE < 80 MM HG: ICD-10-PCS | Mod: CPTII,,, | Performed by: NURSE PRACTITIONER

## 2023-06-29 PROCEDURE — 3075F PR MOST RECENT SYSTOLIC BLOOD PRESS GE 130-139MM HG: ICD-10-PCS | Mod: CPTII,,, | Performed by: NURSE PRACTITIONER

## 2023-06-29 PROCEDURE — 1159F PR MEDICATION LIST DOCUMENTED IN MEDICAL RECORD: ICD-10-PCS | Mod: CPTII,,, | Performed by: NURSE PRACTITIONER

## 2023-06-29 PROCEDURE — 1160F PR REVIEW ALL MEDS BY PRESCRIBER/CLIN PHARMACIST DOCUMENTED: ICD-10-PCS | Mod: CPTII,,, | Performed by: NURSE PRACTITIONER

## 2023-06-29 PROCEDURE — 99214 PR OFFICE/OUTPT VISIT, EST, LEVL IV, 30-39 MIN: ICD-10-PCS | Mod: S$PBB,,, | Performed by: NURSE PRACTITIONER

## 2023-06-29 PROCEDURE — 99214 OFFICE O/P EST MOD 30 MIN: CPT | Mod: S$PBB,,, | Performed by: NURSE PRACTITIONER

## 2023-06-29 PROCEDURE — 3078F DIAST BP <80 MM HG: CPT | Mod: CPTII,,, | Performed by: NURSE PRACTITIONER

## 2023-06-29 NOTE — PROGRESS NOTES
Sheri Ho is a 58 y.o. female here for Gastroesophageal Reflux        PCP: Rajni Barton  Referring Provider: No referring provider defined for this encounter.     HPI:  Presents for six-month follow-up due to liver fibrosis.  Liver elastography 11/22/2021 did show a Metavir score of F2 to F3 consistent with fibrosis.  Liver ultrasound 12/21/2022, is read as normal.  She is due for repeat liver ultrasound.  She also has a portal systemic natural shunt.  Reports that she does not exercise very often due to depression.  States that she continues to be depressed since the death of her mother 4 years ago.  She is attending counseling.  Today she is reporting increased reflux and dysphagia.  Last EGD dilation was 07/29/2022, retained food noted at the time of the EGD.  Gastric emptying study was performed on 08/11/2022, and is read as normal.    Gastroesophageal Reflux  She reports no abdominal pain, no chest pain or no nausea. Pertinent negatives include no fatigue.       ROS:  Review of Systems   Constitutional:  Negative for appetite change, fatigue, fever and unexpected weight change.   HENT:  Positive for postnasal drip and trouble swallowing.    Respiratory:  Negative for shortness of breath.    Cardiovascular:  Negative for chest pain.   Gastrointestinal:  Positive for constipation and reflux. Negative for abdominal pain, blood in stool, change in bowel habit, diarrhea, nausea, vomiting and change in bowel habit.   Musculoskeletal:  Negative for gait problem.   Integumentary:  Negative for pallor.   Psychiatric/Behavioral:  The patient is not nervous/anxious.         PMHX:  has a past medical history of Arthritis, Esophageal dysphagia (4/29/2021), Gastroparesis (7/29/2022), GERD (gastroesophageal reflux disease), Mitral insufficiency, Panic attacks, and SOB (shortness of breath).    PSHX:  has a past surgical history that includes Cholecystectomy; Colonoscopy; Esophagogastroduodenoscopy;  Hysterectomy; Shoulder arthroscopy; Cystoscopy; Rotator cuff repair; Shoulder arthroscopy (Right, 11/30/2021); Arthroscopic repair of rotator cuff of shoulder (Right, 11/30/2021); Oophorectomy; and Lipoma resection (N/A, 09/02/2022).    PFHX: family history includes Breast cancer in her cousin; Diabetes in her father; Heart attack in her father; Heart disease in her father; Hypertension in her father, maternal grandmother, mother, and sister.    PSlHX:  reports that she has never smoked. She has never used smokeless tobacco. She reports that she does not drink alcohol and does not use drugs.        Review of patient's allergies indicates:   Allergen Reactions    Codeine Nausea And Vomiting and Swelling       Medication List with Changes/Refills   Current Medications    ALBUTEROL (PROVENTIL/VENTOLIN HFA) 90 MCG/ACTUATION INHALER    Inhale 2 puffs into the lungs every 4 (four) hours as needed for Wheezing.    ALBUTEROL SULFATE 90 MCG/ACTUATION AEBS    Inhale 180 mcg into the lungs every 6 (six) hours as needed.    AMLODIPINE (NORVASC) 5 MG TABLET    TAKE 1/2 TABLET ONE TIME DAILY    ASPIRIN (ECOTRIN) 81 MG EC TABLET    Take 81 mg by mouth once daily.    BREXPIPRAZOLE (REXULTI) 1 MG TAB    One a day for depression    BUDESONIDE-FORMOTEROL 160-4.5 MCG (SYMBICORT) 160-4.5 MCG/ACTUATION HFAA    Inhale 2 puffs into the lungs 2 (two) times a day.    BUPROPION (WELLBUTRIN SR) 100 MG TBSR 12 HR TABLET    TAKE 1 TABLET ONE TIME DAILY    CETIRIZINE (ZYRTEC) 10 MG TABLET    Take 1 tablet (10 mg total) by mouth once daily.    CYCLOBENZAPRINE (FLEXERIL) 10 MG TABLET    Take 10 mg by mouth 3 (three) times daily.    ESCITALOPRAM OXALATE (LEXAPRO) 20 MG TABLET    TAKE 1 TABLET ONE TIME DAILY    HYDRALAZINE (APRESOLINE) 10 MG TABLET    Take 1 tablet (10 mg total) by mouth 2 (two) times daily.    HYDROCHLOROTHIAZIDE (HYDRODIURIL) 12.5 MG TAB    TAKE 1 TABLET ONE TIME DAILY    HYDROCODONE-ACETAMINOPHEN (NORCO)  MG PER TABLET     "Take 1 tablet by mouth every 8 (eight) hours as needed.    HYDROXYZINE HCL (ATARAX) 10 MG TAB    1-2 tabs Q6hrs prn for nerves, itching, insomnia, or nausea    IBUPROFEN (ADVIL,MOTRIN) 800 MG TABLET    Take 1 tablet (800 mg total) by mouth 2 (two) times a day.    LINZESS 145 MCG CAP CAPSULE    TAKE 1 CAPSULE DAILY BEFORE BREAKFAST.    LOTEPREDNOL (LOTEMAX) 0.5 % OPHTHALMIC SUSPENSION    1 drop. As directed    MIRABEGRON ORAL    Take by mouth.    MULTIVITAMIN (MULTIPLE VITAMINS ORAL)    Take 1 tablet by mouth once daily. OTC: w/ Iron & Folic Acid    MUPIROCIN (BACTROBAN) 2 % OINTMENT    Apply topically once daily.    PANTOPRAZOLE (PROTONIX) 40 MG TABLET    TAKE 1 TABLET ONE TIME DAILY    PROGESTERONE (PROMETRIUM) 200 MG CAPSULE    Take 1 capsule by mouth every evening.    PROPRANOLOL (INDERAL) 40 MG TABLET    TAKE 1 TABLET TWICE DAILY    TRAZODONE (DESYREL) 50 MG TABLET    Take 50 mg by mouth nightly as needed.        Objective Findings:  Vital Signs:  /72   Pulse 69   Resp 18   Ht 5' 5" (1.651 m)   Wt 98 kg (216 lb)   BMI 35.94 kg/m²  Body mass index is 35.94 kg/m².    Physical Exam:  Physical Exam  Vitals and nursing note reviewed.   Constitutional:       General: She is not in acute distress.     Appearance: Normal appearance.   HENT:      Mouth/Throat:      Mouth: Mucous membranes are moist.   Cardiovascular:      Rate and Rhythm: Normal rate.   Pulmonary:      Effort: Pulmonary effort is normal.      Breath sounds: No wheezing, rhonchi or rales.   Abdominal:      General: Bowel sounds are normal. There is no distension.      Palpations: Abdomen is soft. There is no mass.      Tenderness: There is no abdominal tenderness. There is no guarding.   Skin:     General: Skin is warm and dry.      Coloration: Skin is not jaundiced or pale.   Neurological:      Mental Status: She is alert and oriented to person, place, and time.   Psychiatric:         Mood and Affect: Mood normal.        Labs:  Lab Results "   Component Value Date    WBC 7.04 04/06/2023    HGB 14.4 04/06/2023    HCT 44.9 04/06/2023    MCV 92.0 04/06/2023    RDW 13.8 04/06/2023     04/06/2023    LYMPH 42.9 (H) 04/06/2023    LYMPH 3.02 04/06/2023    MONO 8.1 (H) 04/06/2023    EOS 0.07 04/06/2023    BASO 0.05 04/06/2023     Lab Results   Component Value Date     04/06/2023    K 4.3 04/06/2023     04/06/2023    CO2 29 04/06/2023     (H) 04/06/2023    BUN 7 04/06/2023    CREATININE 0.92 04/06/2023    CALCIUM 9.1 04/06/2023    PROT 7.3 04/06/2023    ALBUMIN 3.6 04/06/2023    BILITOT 1.1 04/06/2023    ALKPHOS 100 04/06/2023    AST 15 04/06/2023    ALT 20 04/06/2023         Imaging: No results found.      Assessment:  Sheri Ho is a 58 y.o. female here with:  1. GERD without esophagitis    2. Liver fibrosis    3. Dysphagia, unspecified type          Recommendations:  1. Exercise 150 minutes per week  Weight loss of 7-10%. Weight loss should be gradual  Diet low in saturated fats and carbohydrates  Good glucose and cholesterol control  2. EGD with dilation  3. Avoid spicy, greasy foods  Avoid caffeine, citric acid, chocolate, peppermint, and carbonated drinks  Do not lay down within 3 hours of eating  Exercise 150 minutes per week  Increase fluid to 64 ounces daily  Avoid antiinflammatory medications such as motrin, advil, aleve, ibuprofen, and BC powder  4. Continue Protonix  5. May continue Linzess for constipation      Follow up in about 6 months (around 12/29/2023).      Order summary:  Orders Placed This Encounter    US Liver with Doppler (xpd)    EGD w Dilation       Thank you for allowing me to participate in the care of Sheri Ho.      KOKO Gastelum

## 2023-06-29 NOTE — PATIENT INSTRUCTIONS
Do not lay down within 3 hours of eating.  Avoid spicy, greasy foods  Eat 6-8 small meals per day.  Exercise 150 minutes per week  Avoid raw fruits and vegetables  Small amount of protein and fiber at one time    Exercise 150 minutes per week  Weight loss of 7-10%. Weight loss should be gradual  Diet low in saturated fats and carbohydrates  Good glucose and cholesterol control

## 2023-07-12 ENCOUNTER — HOSPITAL ENCOUNTER (OUTPATIENT)
Dept: RADIOLOGY | Facility: HOSPITAL | Age: 58
Discharge: HOME | End: 2023-07-12
Attending: NURSE PRACTITIONER
Payer: MEDICARE

## 2023-07-12 DIAGNOSIS — K74.00 LIVER FIBROSIS: ICD-10-CM

## 2023-07-12 PROCEDURE — 76705 ECHO EXAM OF ABDOMEN: CPT | Mod: 26,59,, | Performed by: STUDENT IN AN ORGANIZED HEALTH CARE EDUCATION/TRAINING PROGRAM

## 2023-07-12 PROCEDURE — 76705 US LIVER WITH DOPPLER: ICD-10-PCS | Mod: 26,59,, | Performed by: STUDENT IN AN ORGANIZED HEALTH CARE EDUCATION/TRAINING PROGRAM

## 2023-07-12 PROCEDURE — 93976 VASCULAR STUDY: CPT | Mod: 26,,, | Performed by: STUDENT IN AN ORGANIZED HEALTH CARE EDUCATION/TRAINING PROGRAM

## 2023-07-12 PROCEDURE — 93976 VASCULAR STUDY: CPT | Mod: TC

## 2023-07-12 PROCEDURE — 93976 US LIVER WITH DOPPLER: ICD-10-PCS | Mod: 26,,, | Performed by: STUDENT IN AN ORGANIZED HEALTH CARE EDUCATION/TRAINING PROGRAM

## 2023-07-14 ENCOUNTER — OFFICE VISIT (OUTPATIENT)
Dept: FAMILY MEDICINE | Facility: CLINIC | Age: 58
End: 2023-07-14
Payer: MEDICARE

## 2023-07-14 VITALS
HEART RATE: 73 BPM | RESPIRATION RATE: 18 BRPM | BODY MASS INDEX: 35.65 KG/M2 | WEIGHT: 214 LBS | SYSTOLIC BLOOD PRESSURE: 144 MMHG | TEMPERATURE: 99 F | OXYGEN SATURATION: 98 % | DIASTOLIC BLOOD PRESSURE: 78 MMHG | HEIGHT: 65 IN

## 2023-07-14 DIAGNOSIS — R30.0 DYSURIA: ICD-10-CM

## 2023-07-14 DIAGNOSIS — J01.90 ACUTE NON-RECURRENT SINUSITIS, UNSPECIFIED LOCATION: ICD-10-CM

## 2023-07-14 DIAGNOSIS — Z11.52 ENCOUNTER FOR SCREENING LABORATORY TESTING FOR COVID-19 VIRUS: Primary | ICD-10-CM

## 2023-07-14 LAB
BILIRUB SERPL-MCNC: ABNORMAL MG/DL
BLOOD URINE, POC: NEGATIVE
COLOR, POC UA: YELLOW
CTP QC/QA: YES
FLUAV AG NPH QL: POSITIVE
FLUBV AG NPH QL: NEGATIVE
GLUCOSE UR QL STRIP: ABNORMAL
KETONES UR QL STRIP: ABNORMAL
LEUKOCYTE ESTERASE URINE, POC: NEGATIVE
NITRITE, POC UA: NEGATIVE
PH, POC UA: 6.5
PROTEIN, POC: ABNORMAL
SARS-COV-2 AG RESP QL IA.RAPID: NEGATIVE
SPECIFIC GRAVITY, POC UA: 1.02
UROBILINOGEN, POC UA: >=8

## 2023-07-14 PROCEDURE — 87428 POCT SARS-COV2 (COVID) WITH FLU ANTIGEN: ICD-10-PCS | Mod: QW,,, | Performed by: FAMILY MEDICINE

## 2023-07-14 PROCEDURE — 81003 URINALYSIS AUTO W/O SCOPE: CPT | Mod: QW,,, | Performed by: FAMILY MEDICINE

## 2023-07-14 PROCEDURE — 99213 PR OFFICE/OUTPT VISIT, EST, LEVL III, 20-29 MIN: ICD-10-PCS | Mod: 25,,, | Performed by: FAMILY MEDICINE

## 2023-07-14 PROCEDURE — 3044F HG A1C LEVEL LT 7.0%: CPT | Mod: ,,, | Performed by: FAMILY MEDICINE

## 2023-07-14 PROCEDURE — 96372 THER/PROPH/DIAG INJ SC/IM: CPT | Mod: ,,, | Performed by: FAMILY MEDICINE

## 2023-07-14 PROCEDURE — 3077F PR MOST RECENT SYSTOLIC BLOOD PRESSURE >= 140 MM HG: ICD-10-PCS | Mod: ,,, | Performed by: FAMILY MEDICINE

## 2023-07-14 PROCEDURE — 3008F BODY MASS INDEX DOCD: CPT | Mod: ,,, | Performed by: FAMILY MEDICINE

## 2023-07-14 PROCEDURE — 87428 SARSCOV & INF VIR A&B AG IA: CPT | Mod: QW,,, | Performed by: FAMILY MEDICINE

## 2023-07-14 PROCEDURE — 96372 PR INJECTION,THERAP/PROPH/DIAG2ST, IM OR SUBCUT: ICD-10-PCS | Mod: ,,, | Performed by: FAMILY MEDICINE

## 2023-07-14 PROCEDURE — 3077F SYST BP >= 140 MM HG: CPT | Mod: ,,, | Performed by: FAMILY MEDICINE

## 2023-07-14 PROCEDURE — 1159F MED LIST DOCD IN RCRD: CPT | Mod: ,,, | Performed by: FAMILY MEDICINE

## 2023-07-14 PROCEDURE — 3078F DIAST BP <80 MM HG: CPT | Mod: ,,, | Performed by: FAMILY MEDICINE

## 2023-07-14 PROCEDURE — 3078F PR MOST RECENT DIASTOLIC BLOOD PRESSURE < 80 MM HG: ICD-10-PCS | Mod: ,,, | Performed by: FAMILY MEDICINE

## 2023-07-14 PROCEDURE — 99213 OFFICE O/P EST LOW 20 MIN: CPT | Mod: 25,,, | Performed by: FAMILY MEDICINE

## 2023-07-14 PROCEDURE — 81003 POCT URINALYSIS W/O SCOPE: ICD-10-PCS | Mod: QW,,, | Performed by: FAMILY MEDICINE

## 2023-07-14 PROCEDURE — 3044F PR MOST RECENT HEMOGLOBIN A1C LEVEL <7.0%: ICD-10-PCS | Mod: ,,, | Performed by: FAMILY MEDICINE

## 2023-07-14 PROCEDURE — 1159F PR MEDICATION LIST DOCUMENTED IN MEDICAL RECORD: ICD-10-PCS | Mod: ,,, | Performed by: FAMILY MEDICINE

## 2023-07-14 PROCEDURE — 3008F PR BODY MASS INDEX (BMI) DOCUMENTED: ICD-10-PCS | Mod: ,,, | Performed by: FAMILY MEDICINE

## 2023-07-14 RX ORDER — CEFDINIR 300 MG/1
300 CAPSULE ORAL 2 TIMES DAILY
Qty: 20 CAPSULE | Refills: 0 | Status: SHIPPED | OUTPATIENT
Start: 2023-07-14 | End: 2023-07-24

## 2023-07-14 RX ORDER — DEXAMETHASONE SODIUM PHOSPHATE 4 MG/ML
4 INJECTION, SOLUTION INTRA-ARTICULAR; INTRALESIONAL; INTRAMUSCULAR; INTRAVENOUS; SOFT TISSUE
Status: COMPLETED | OUTPATIENT
Start: 2023-07-14 | End: 2023-07-14

## 2023-07-14 RX ORDER — PREDNISONE 20 MG/1
TABLET ORAL
Qty: 10 TABLET | Refills: 0 | Status: SHIPPED | OUTPATIENT
Start: 2023-07-14 | End: 2023-10-30

## 2023-07-14 RX ADMIN — DEXAMETHASONE SODIUM PHOSPHATE 4 MG: 4 INJECTION, SOLUTION INTRA-ARTICULAR; INTRALESIONAL; INTRAMUSCULAR; INTRAVENOUS; SOFT TISSUE at 09:07

## 2023-07-14 NOTE — PROGRESS NOTES
Subjective     Patient ID: Sheri Ho is a 58 y.o. female.    Chief Complaint: Urinary Frequency (Patient has some urinary frequency and some pain during urination ), Cough (Patient presents with a cough that's been ongoing for about a week), and Dysuria    All symptoms began about 1 week ago.  Hacking nonproductive cough no fever he has chest pain and abdominal pain.  Likely due to cough.  No vomiting or diarrhea    Urinary Frequency   Associated symptoms include frequency.   Cough    Dysuria   Associated symptoms include frequency.   Review of Systems   Respiratory:  Positive for cough.    Genitourinary:  Positive for dysuria and frequency.        Objective     Physical Exam  Constitutional:       General: She is not in acute distress.     Appearance: She is not ill-appearing.   HENT:      Right Ear: Tympanic membrane normal.      Left Ear: Tympanic membrane normal.      Nose: Congestion and rhinorrhea present.      Mouth/Throat:      Pharynx: No posterior oropharyngeal erythema.   Cardiovascular:      Rate and Rhythm: Normal rate and regular rhythm.   Pulmonary:      Effort: Pulmonary effort is normal.      Breath sounds: Normal breath sounds.   Abdominal:      General: Abdomen is flat. Bowel sounds are normal.      Palpations: Abdomen is soft.      Tenderness: There is abdominal tenderness (Very mild tenderness across lower abdomen).   Neurological:      Mental Status: She is alert.          Assessment and Plan     1. Encounter for screening laboratory testing for COVID-19 virus  -     POCT SARS-COV2 (COVID) with Flu Antigen    2. Dysuria  -     POCT URINALYSIS W/O SCOPE    3. Acute non-recurrent sinusitis, unspecified location    Other orders  -     dexAMETHasone injection 4 mg  -     predniSONE (DELTASONE) 20 MG tablet; Two every morning  Dispense: 10 tablet; Refill: 0  -     cefdinir (OMNICEF) 300 MG capsule; Take 1 capsule (300 mg total) by mouth 2 (two) times daily. for 10 days  Dispense: 20  capsule; Refill: 0        Urine is clear.  The cefdinir prescribed for sinusitis should treat UTI.  Call if not much better in 2 days         No follow-ups on file.

## 2023-07-19 ENCOUNTER — ANESTHESIA EVENT (OUTPATIENT)
Dept: PAIN MEDICINE | Facility: HOSPITAL | Age: 58
End: 2023-07-19
Payer: MEDICARE

## 2023-07-19 ENCOUNTER — ANESTHESIA (OUTPATIENT)
Dept: PAIN MEDICINE | Facility: HOSPITAL | Age: 58
End: 2023-07-19
Payer: MEDICARE

## 2023-07-19 ENCOUNTER — HOSPITAL ENCOUNTER (OUTPATIENT)
Facility: HOSPITAL | Age: 58
Discharge: HOME | End: 2023-07-19
Attending: ANESTHESIOLOGY | Admitting: ANESTHESIOLOGY
Payer: MEDICARE

## 2023-07-19 VITALS
WEIGHT: 215 LBS | HEART RATE: 64 BPM | TEMPERATURE: 99 F | BODY MASS INDEX: 35.82 KG/M2 | RESPIRATION RATE: 14 BRPM | OXYGEN SATURATION: 98 % | SYSTOLIC BLOOD PRESSURE: 139 MMHG | DIASTOLIC BLOOD PRESSURE: 79 MMHG | HEIGHT: 65 IN

## 2023-07-19 DIAGNOSIS — M25.512 LEFT SHOULDER PAIN: ICD-10-CM

## 2023-07-19 PROCEDURE — D9220A PRA ANESTHESIA: Mod: 23,,, | Performed by: NURSE ANESTHETIST, CERTIFIED REGISTERED

## 2023-07-19 PROCEDURE — 25000003 PHARM REV CODE 250: Performed by: ORTHOPAEDIC SURGERY

## 2023-07-19 PROCEDURE — 25000003 PHARM REV CODE 250: Performed by: NURSE ANESTHETIST, CERTIFIED REGISTERED

## 2023-07-19 PROCEDURE — 64450 NJX AA&/STRD OTHER PN/BRANCH: CPT | Mod: LT | Performed by: ANESTHESIOLOGY

## 2023-07-19 PROCEDURE — 63600175 PHARM REV CODE 636 W HCPCS: Performed by: NURSE ANESTHETIST, CERTIFIED REGISTERED

## 2023-07-19 PROCEDURE — D9220A PRA ANESTHESIA: ICD-10-PCS | Mod: 23,,, | Performed by: NURSE ANESTHETIST, CERTIFIED REGISTERED

## 2023-07-19 PROCEDURE — 25000003 PHARM REV CODE 250: Performed by: ANESTHESIOLOGY

## 2023-07-19 PROCEDURE — 37000008 HC ANESTHESIA 1ST 15 MINUTES: Performed by: ANESTHESIOLOGY

## 2023-07-19 RX ORDER — BUPIVACAINE HYDROCHLORIDE AND EPINEPHRINE 2.5; 5 MG/ML; UG/ML
INJECTION, SOLUTION EPIDURAL; INFILTRATION; INTRACAUDAL; PERINEURAL CODE/TRAUMA/SEDATION MEDICATION
Status: DISCONTINUED | OUTPATIENT
Start: 2023-07-19 | End: 2023-07-19 | Stop reason: HOSPADM

## 2023-07-19 RX ORDER — LIDOCAINE HYDROCHLORIDE 20 MG/ML
INJECTION, SOLUTION EPIDURAL; INFILTRATION; INTRACAUDAL; PERINEURAL
Status: DISCONTINUED | OUTPATIENT
Start: 2023-07-19 | End: 2023-07-19

## 2023-07-19 RX ORDER — PROPOFOL 10 MG/ML
INJECTION, EMULSION INTRAVENOUS
Status: DISCONTINUED | OUTPATIENT
Start: 2023-07-19 | End: 2023-07-19

## 2023-07-19 RX ADMIN — LIDOCAINE HYDROCHLORIDE 60 MG: 20 INJECTION, SOLUTION INTRAVENOUS at 03:07

## 2023-07-19 RX ADMIN — PROPOFOL 40 MG: 10 INJECTION, EMULSION INTRAVENOUS at 03:07

## 2023-07-19 RX ADMIN — PROPOFOL 80 MG: 10 INJECTION, EMULSION INTRAVENOUS at 03:07

## 2023-07-19 RX ADMIN — SODIUM CHLORIDE: 9 INJECTION, SOLUTION INTRAVENOUS at 02:07

## 2023-07-19 RX ADMIN — PROPOFOL 60 MG: 10 INJECTION, EMULSION INTRAVENOUS at 03:07

## 2023-07-19 NOTE — ANESTHESIA POSTPROCEDURE EVALUATION
Anesthesia Post Evaluation    Patient: Sheri Ho    Procedure(s) Performed: Procedure(s) (LRB):  BLOCK, NERVE, SHOULDER (Left)    Final Anesthesia Type: general      Patient location during evaluation: PACU  Patient participation: Yes- Able to Participate  Level of consciousness: awake and alert and oriented  Post-procedure vital signs: reviewed and stable  Pain management: adequate  Airway patency: patent    PONV status at discharge: No PONV  Anesthetic complications: no      Cardiovascular status: blood pressure returned to baseline and hemodynamically stable  Respiratory status: unassisted  Hydration status: euvolemic  Follow-up not needed.          Vitals Value Taken Time   /74 07/19/23 1528   Temp  07/19/23 1528   Pulse 74 07/19/23 1528   Resp 17 07/19/23 1528   SpO2 99 % 07/19/23 1528   Vitals shown include unvalidated device data.      No case tracking events are documented in the log.      Pain/Avila Score: No data recorded

## 2023-07-19 NOTE — TRANSFER OF CARE
"Anesthesia Transfer of Care Note    Patient: Sheri Ho    Procedure(s) Performed: Procedure(s) (LRB):  BLOCK, NERVE, SHOULDER (Left)    Patient location: PACU    Anesthesia Type: general    Transport from OR: Transported from OR on room air with adequate spontaneous ventilation    Post pain: adequate analgesia    Post assessment: no apparent anesthetic complications and tolerated procedure well    Post vital signs: stable    Level of consciousness: alert and responds to stimulation    Nausea/Vomiting: no nausea/vomiting    Complications: none    Transfer of care protocol was followed      Last vitals:   Visit Vitals  /85   Pulse 74   Temp 37.1 °C (98.8 °F) (Oral)   Resp 12   Ht 5' 5" (1.651 m)   Wt 97.5 kg (215 lb)   SpO2 (!) 94%   BMI 35.78 kg/m²     "

## 2023-07-19 NOTE — H&P
No changes from original H & P note dated 06/26/2023 and has been reviewed and is valid. See scanned note

## 2023-07-19 NOTE — OP NOTE
07/19/2023  LEFT SIDE SENSORY BRANCHES OF AXILLARY, SUPASCAPULAR, and  LATERAL PECTORAL Nerve Blocks     PREOPERATIVE DIAGNOSIS: Left shoulder pain      POSTOPERATIVE DIAGNOSIS: Left shoulder pain      PROCEDURE:  1. Suprascapular articular branch from the Suprascapular Nerve block  2. Axillary articular branch from the Axillary Nerve block  3. Lateral Pectoral articular branch from the Lateral Pectoral Nerve block     SURGEON: ROCIO SHABAZZ     ANESTHESIA: MAC     COMPLICATIONS: None     Blood loss: None      The patient was brought to the procedure room and placed on the exam table in a comfortable prone position for the posterior ablations of the Suprascapular and Axillary nerve branches. The target site for needle placement was obtained by manual palpation with radiographic confirmation. The sterile field was prepared using chloroprep and sterile drapes. Local anesthesia superficial and deep was provided by local infiltration of 0.25% Bupivacaine(2.5mg/ml). The patient was rotated after the completion of the posterior Suprascapular and Axillary nerve branch block  to a supine position to perform the anterior block of the Lateral Pectoral nerve branch. The target site for needle placement was obtained by manual palpation with radiographic confirmation. The sterile field was prepared using chloroprep and sterile drapes. Local anesthesia superficial and deep was provided by local infiltration of 0.25% Bupivacaine(2.5mg/ml). A  22 gauge 3.5 inch needle was placed overlying the left shoulder joint. For the suprascapular target the introducer was placed superior to the spinoglenoid notch on the most lateral border of the glenoid fossa rim and then redirected inferiorly approximately 4mm for the block. For the axillary target the introducer was advanced to the inferior most lateral border of the greater tubercle and then repositioned approximately 4mm inferiorly for the block. For the lateral pectoral target  the introducer was placed at the midpoint of the coracoid process. All imaging was done using fluoroscopy. Attempted aspiration yielded no blood. Lateral xray views showed all the needles at proper depth.  Images were saved in AP and lateral. Bupivacaine 0.25% (2.5mg/ml) 1cc was slowly injected. The needles were withdrawn. There was adequate hemostasis at the conclusion of the procedure.  The patient tolerated the procedure well with no adverse events.  There was no paresthesia with needle placement or injection.  The patient was taken in stable condition to the holding area and was monitored for the appropriate time of convalescence and discharged to the care of the patients . Pre pain 6/10 post pain /10

## 2023-07-19 NOTE — PLAN OF CARE
Plan:  D/c pt via wheelchair at 1555    Informed pt if does not void in 8 hours to go to ER. Notify if redness, drainage, from injection site or fever over next 3-4 days. Rest and drink plenty of fluids for the remainder of the day. No lifting over 5 lbs. For the remainder of the day. Continue regular medications as prescribed. May take pain medications as prescribed.     Pain improved 100%

## 2023-07-19 NOTE — DISCHARGE SUMMARY
Patient underwent  LEFT SIDE SENSORY BRANCHES OF AXILLARY, SUPASCAPULAR, and  LATERAL PECTORAL Nerve Blocks  procedure 07/19/2023. The pt will follow up in clinic. Discharged home. Discharge Dx:Left shoulder pain

## 2023-07-19 NOTE — ANESTHESIA PREPROCEDURE EVALUATION
07/19/2023  Sheri Ho is a 58 y.o., female.      Pre-op Assessment    I have reviewed the Patient Summary Reports.     I have reviewed the Nursing Notes. I have reviewed the NPO Status.   I have reviewed the Medications.     Review of Systems  Cardiovascular:   Hypertension Valvular problems/Murmurs, MR    Pulmonary:   Asthma Shortness of breath    Hepatic/GI:   GERD Liver Disease,    Endocrine:  Obesity / BMI > 30  Psych:   Psychiatric History          Physical Exam  General: Well nourished, Alert and Oriented    Airway:  Mallampati: II   Mouth Opening: Normal  TM Distance: Normal  Tongue: Normal  Neck ROM: Normal ROM    Dental:  Intact        Anesthesia Plan  Type of Anesthesia, risks & benefits discussed:    Anesthesia Type: Gen Natural Airway  Intra-op Monitoring Plan: Standard ASA Monitors  Post Op Pain Control Plan: multimodal analgesia  Induction:  IV  Informed Consent: Informed consent signed with the Patient and all parties understand the risks and agree with anesthesia plan.  All questions answered. Patient consented to blood products? Yes  ASA Score: 3  Day of Surgery Review of History & Physical: H&P Update referred to the surgeon/provider.    Ready For Surgery From Anesthesia Perspective.     .

## 2023-09-07 PROBLEM — M25.612 DECREASED ROM OF LEFT SHOULDER: Status: RESOLVED | Noted: 2023-04-11 | Resolved: 2023-09-07

## 2023-09-07 PROBLEM — S46.812D INFRASPINATUS TENDON TEAR, LEFT, SUBSEQUENT ENCOUNTER: Status: RESOLVED | Noted: 2023-04-11 | Resolved: 2023-09-07

## 2023-09-07 PROBLEM — M25.511 BILATERAL SHOULDER PAIN: Status: RESOLVED | Noted: 2023-04-11 | Resolved: 2023-09-07

## 2023-09-07 PROBLEM — M25.611 DECREASED ROM OF RIGHT SHOULDER: Status: RESOLVED | Noted: 2023-04-11 | Resolved: 2023-09-07

## 2023-09-07 PROBLEM — M25.512 BILATERAL SHOULDER PAIN: Status: RESOLVED | Noted: 2023-04-11 | Resolved: 2023-09-07

## 2023-10-24 DIAGNOSIS — R10.9 ABDOMINAL PAIN: Primary | ICD-10-CM

## 2023-10-30 ENCOUNTER — OFFICE VISIT (OUTPATIENT)
Dept: FAMILY MEDICINE | Facility: CLINIC | Age: 58
End: 2023-10-30
Payer: MEDICARE

## 2023-10-30 VITALS
RESPIRATION RATE: 18 BRPM | DIASTOLIC BLOOD PRESSURE: 81 MMHG | HEART RATE: 71 BPM | WEIGHT: 203.81 LBS | SYSTOLIC BLOOD PRESSURE: 134 MMHG | HEIGHT: 65 IN | BODY MASS INDEX: 33.96 KG/M2

## 2023-10-30 DIAGNOSIS — Z11.4 ENCOUNTER FOR SCREENING FOR HIV: ICD-10-CM

## 2023-10-30 DIAGNOSIS — Z91.09 ENVIRONMENTAL ALLERGIES: ICD-10-CM

## 2023-10-30 DIAGNOSIS — Z13.1 SCREENING FOR DIABETES MELLITUS: ICD-10-CM

## 2023-10-30 DIAGNOSIS — R30.0 DYSURIA: ICD-10-CM

## 2023-10-30 DIAGNOSIS — N30.01 ACUTE CYSTITIS WITH HEMATURIA: ICD-10-CM

## 2023-10-30 DIAGNOSIS — E55.9 VITAMIN D DEFICIENCY: ICD-10-CM

## 2023-10-30 DIAGNOSIS — Z23 NEED FOR VACCINATION: ICD-10-CM

## 2023-10-30 DIAGNOSIS — Z13.220 SCREENING FOR LIPOID DISORDERS: ICD-10-CM

## 2023-10-30 DIAGNOSIS — F32.A DEPRESSION, UNSPECIFIED DEPRESSION TYPE: ICD-10-CM

## 2023-10-30 DIAGNOSIS — R73.03 PREDIABETES: ICD-10-CM

## 2023-10-30 DIAGNOSIS — I10 ESSENTIAL HYPERTENSION, BENIGN: Primary | ICD-10-CM

## 2023-10-30 LAB
25(OH)D3 SERPL-MCNC: 32.6 NG/ML
ANION GAP SERPL CALCULATED.3IONS-SCNC: 7 MMOL/L (ref 7–16)
BACTERIA #/AREA URNS HPF: ABNORMAL /HPF
BASOPHILS # BLD AUTO: 0.04 K/UL (ref 0–0.2)
BASOPHILS NFR BLD AUTO: 0.9 % (ref 0–1)
BILIRUB UR QL STRIP: NEGATIVE
BUN SERPL-MCNC: 10 MG/DL (ref 7–18)
BUN/CREAT SERPL: 10 (ref 6–20)
CALCIUM SERPL-MCNC: 9.3 MG/DL (ref 8.5–10.1)
CAOX CRY URNS QL MICRO: ABNORMAL /HPF
CHLORIDE SERPL-SCNC: 107 MMOL/L (ref 98–107)
CLARITY UR: ABNORMAL
CO2 SERPL-SCNC: 29 MMOL/L (ref 21–32)
COLOR UR: YELLOW
CREAT SERPL-MCNC: 0.97 MG/DL (ref 0.55–1.02)
DIFFERENTIAL METHOD BLD: ABNORMAL
EGFR (NO RACE VARIABLE) (RUSH/TITUS): 68 ML/MIN/1.73M2
EOSINOPHIL # BLD AUTO: 0.03 K/UL (ref 0–0.5)
EOSINOPHIL NFR BLD AUTO: 0.7 % (ref 1–4)
ERYTHROCYTE [DISTWIDTH] IN BLOOD BY AUTOMATED COUNT: 12.3 % (ref 11.5–14.5)
EST. AVERAGE GLUCOSE BLD GHB EST-MCNC: 117 MG/DL
GLUCOSE SERPL-MCNC: 95 MG/DL (ref 74–106)
GLUCOSE UR STRIP-MCNC: NORMAL MG/DL
HBA1C MFR BLD HPLC: 6.1 % (ref 4.5–6.6)
HCT VFR BLD AUTO: 41.5 % (ref 38–47)
HGB BLD-MCNC: 13.9 G/DL (ref 12–16)
HIV 1+O+2 AB SERPL QL: NORMAL
IMM GRANULOCYTES # BLD AUTO: 0.01 K/UL (ref 0–0.04)
IMM GRANULOCYTES NFR BLD: 0.2 % (ref 0–0.4)
KETONES UR STRIP-SCNC: NEGATIVE MG/DL
LEUKOCYTE ESTERASE UR QL STRIP: ABNORMAL
LYMPHOCYTES # BLD AUTO: 2.04 K/UL (ref 1–4.8)
LYMPHOCYTES NFR BLD AUTO: 46.5 % (ref 27–41)
MCH RBC QN AUTO: 30.3 PG (ref 27–31)
MCHC RBC AUTO-ENTMCNC: 33.5 G/DL (ref 32–36)
MCV RBC AUTO: 90.6 FL (ref 80–96)
MONOCYTES # BLD AUTO: 0.45 K/UL (ref 0–0.8)
MONOCYTES NFR BLD AUTO: 10.3 % (ref 2–6)
MPC BLD CALC-MCNC: 10.3 FL (ref 9.4–12.4)
MUCOUS, UA: ABNORMAL /LPF
NEUTROPHILS # BLD AUTO: 1.82 K/UL (ref 1.8–7.7)
NEUTROPHILS NFR BLD AUTO: 41.4 % (ref 53–65)
NITRITE UR QL STRIP: NEGATIVE
NRBC # BLD AUTO: 0 X10E3/UL
NRBC, AUTO (.00): 0 %
PH UR STRIP: 6 PH UNITS
PLATELET # BLD AUTO: 311 K/UL (ref 150–400)
POTASSIUM SERPL-SCNC: 3.9 MMOL/L (ref 3.5–5.1)
PROT UR QL STRIP: 10
RBC # BLD AUTO: 4.58 M/UL (ref 4.2–5.4)
RBC # UR STRIP: NEGATIVE /UL
RBC #/AREA URNS HPF: 2 /HPF
SODIUM SERPL-SCNC: 139 MMOL/L (ref 136–145)
SP GR UR STRIP: 1.02
SQUAMOUS #/AREA URNS LPF: ABNORMAL /HPF
TRANS CELLS #/AREA URNS LPF: ABNORMAL /LPF
UROBILINOGEN UR STRIP-ACNC: 3 MG/DL
WBC # BLD AUTO: 4.39 K/UL (ref 4.5–11)
WBC #/AREA URNS HPF: 4 /HPF

## 2023-10-30 PROCEDURE — 3008F BODY MASS INDEX DOCD: CPT | Mod: ,,, | Performed by: NURSE PRACTITIONER

## 2023-10-30 PROCEDURE — 3044F PR MOST RECENT HEMOGLOBIN A1C LEVEL <7.0%: ICD-10-PCS | Mod: ,,, | Performed by: NURSE PRACTITIONER

## 2023-10-30 PROCEDURE — 81001 URINALYSIS AUTO W/SCOPE: CPT | Mod: ,,, | Performed by: CLINICAL MEDICAL LABORATORY

## 2023-10-30 PROCEDURE — 1160F RVW MEDS BY RX/DR IN RCRD: CPT | Mod: ,,, | Performed by: NURSE PRACTITIONER

## 2023-10-30 PROCEDURE — 82306 VITAMIN D 25 HYDROXY: CPT | Mod: ,,, | Performed by: CLINICAL MEDICAL LABORATORY

## 2023-10-30 PROCEDURE — G0008 ADMIN INFLUENZA VIRUS VAC: HCPCS | Mod: ,,, | Performed by: NURSE PRACTITIONER

## 2023-10-30 PROCEDURE — 99214 OFFICE O/P EST MOD 30 MIN: CPT | Mod: ,,, | Performed by: NURSE PRACTITIONER

## 2023-10-30 PROCEDURE — 1159F PR MEDICATION LIST DOCUMENTED IN MEDICAL RECORD: ICD-10-PCS | Mod: ,,, | Performed by: NURSE PRACTITIONER

## 2023-10-30 PROCEDURE — 1159F MED LIST DOCD IN RCRD: CPT | Mod: ,,, | Performed by: NURSE PRACTITIONER

## 2023-10-30 PROCEDURE — 3008F PR BODY MASS INDEX (BMI) DOCUMENTED: ICD-10-PCS | Mod: ,,, | Performed by: NURSE PRACTITIONER

## 2023-10-30 PROCEDURE — G0008 FLU VACCINE (QUAD) GREATER THAN OR EQUAL TO 3YO PRESERVATIVE FREE IM: ICD-10-PCS | Mod: ,,, | Performed by: NURSE PRACTITIONER

## 2023-10-30 PROCEDURE — 85025 CBC WITH DIFFERENTIAL: ICD-10-PCS | Mod: ,,, | Performed by: CLINICAL MEDICAL LABORATORY

## 2023-10-30 PROCEDURE — 3079F DIAST BP 80-89 MM HG: CPT | Mod: ,,, | Performed by: NURSE PRACTITIONER

## 2023-10-30 PROCEDURE — 3044F HG A1C LEVEL LT 7.0%: CPT | Mod: ,,, | Performed by: NURSE PRACTITIONER

## 2023-10-30 PROCEDURE — 85025 COMPLETE CBC W/AUTO DIFF WBC: CPT | Mod: ,,, | Performed by: CLINICAL MEDICAL LABORATORY

## 2023-10-30 PROCEDURE — 80048 BASIC METABOLIC PNL TOTAL CA: CPT | Mod: ,,, | Performed by: CLINICAL MEDICAL LABORATORY

## 2023-10-30 PROCEDURE — 87389 HIV-1 AG W/HIV-1&-2 AB AG IA: CPT | Mod: ,,, | Performed by: CLINICAL MEDICAL LABORATORY

## 2023-10-30 PROCEDURE — 81001 URINALYSIS, REFLEX TO URINE CULTURE: ICD-10-PCS | Mod: ,,, | Performed by: CLINICAL MEDICAL LABORATORY

## 2023-10-30 PROCEDURE — 90686 FLU VACCINE (QUAD) GREATER THAN OR EQUAL TO 3YO PRESERVATIVE FREE IM: ICD-10-PCS | Mod: ,,, | Performed by: NURSE PRACTITIONER

## 2023-10-30 PROCEDURE — 83036 HEMOGLOBIN GLYCOSYLATED A1C: CPT | Mod: ,,, | Performed by: CLINICAL MEDICAL LABORATORY

## 2023-10-30 PROCEDURE — 3075F PR MOST RECENT SYSTOLIC BLOOD PRESS GE 130-139MM HG: ICD-10-PCS | Mod: ,,, | Performed by: NURSE PRACTITIONER

## 2023-10-30 PROCEDURE — 90686 IIV4 VACC NO PRSV 0.5 ML IM: CPT | Mod: ,,, | Performed by: NURSE PRACTITIONER

## 2023-10-30 PROCEDURE — 99214 PR OFFICE/OUTPT VISIT, EST, LEVL IV, 30-39 MIN: ICD-10-PCS | Mod: ,,, | Performed by: NURSE PRACTITIONER

## 2023-10-30 PROCEDURE — 83036 HEMOGLOBIN A1C: ICD-10-PCS | Mod: ,,, | Performed by: CLINICAL MEDICAL LABORATORY

## 2023-10-30 PROCEDURE — 3075F SYST BP GE 130 - 139MM HG: CPT | Mod: ,,, | Performed by: NURSE PRACTITIONER

## 2023-10-30 PROCEDURE — 1160F PR REVIEW ALL MEDS BY PRESCRIBER/CLIN PHARMACIST DOCUMENTED: ICD-10-PCS | Mod: ,,, | Performed by: NURSE PRACTITIONER

## 2023-10-30 PROCEDURE — 3079F PR MOST RECENT DIASTOLIC BLOOD PRESSURE 80-89 MM HG: ICD-10-PCS | Mod: ,,, | Performed by: NURSE PRACTITIONER

## 2023-10-30 PROCEDURE — 80048 BASIC METABOLIC PANEL: ICD-10-PCS | Mod: ,,, | Performed by: CLINICAL MEDICAL LABORATORY

## 2023-10-30 PROCEDURE — 87389 HIV 1 / 2 ANTIBODY: ICD-10-PCS | Mod: ,,, | Performed by: CLINICAL MEDICAL LABORATORY

## 2023-10-30 PROCEDURE — 82306 VITAMIN D: ICD-10-PCS | Mod: ,,, | Performed by: CLINICAL MEDICAL LABORATORY

## 2023-10-30 RX ORDER — BREXPIPRAZOLE 1 MG/1
TABLET ORAL
Qty: 90 TABLET | Refills: 3 | Status: SHIPPED | OUTPATIENT
Start: 2023-10-30 | End: 2023-11-08

## 2023-10-30 RX ORDER — ESCITALOPRAM OXALATE 20 MG/1
20 TABLET ORAL DAILY
Qty: 90 TABLET | Refills: 3 | Status: SHIPPED | OUTPATIENT
Start: 2023-10-30

## 2023-10-30 RX ORDER — HYDROCHLOROTHIAZIDE 12.5 MG/1
12.5 TABLET ORAL DAILY
Qty: 90 TABLET | Refills: 3 | Status: SHIPPED | OUTPATIENT
Start: 2023-10-30 | End: 2024-10-24

## 2023-10-30 RX ORDER — CETIRIZINE HYDROCHLORIDE 10 MG/1
10 TABLET ORAL DAILY
Qty: 90 TABLET | Refills: 3 | Status: SHIPPED | OUTPATIENT
Start: 2023-10-30

## 2023-10-30 NOTE — PROGRESS NOTES
Subjective:       Patient ID: Sheri Ho is a 58 y.o. female.    Chief Complaint: Healthy You (Patient is in for a healthy you) and Establish Care (Patient is in for healthy you , to establish care and also to get flu shot )      Active Problem List with Overview Notes    Diagnosis Date Noted    Vitamin D deficiency 10/30/2023    Need for vaccination 10/30/2023    Encounter for screening for HIV 10/30/2023    Environmental allergies 10/30/2023    Depression 10/30/2023    Fatty liver 12/13/2022    Gastroparesis 07/29/2022    Prediabetes 04/08/2022    Essential hypertension, benign 04/01/2022    Encounter for long-term (current) use of other medications 04/01/2022    Dysuria 04/01/2022    Chronic fatigue 04/01/2022    Moderate episode of recurrent major depressive disorder 04/01/2022    Panic attacks     Liver fibrosis 12/21/2021    Nontraumatic complete tear of right rotator cuff 11/10/2021    Constipation 11/01/2021    Abdominal pain 11/01/2021    S/P right rotator cuff repair 06/16/2021    Dysphagia 04/29/2021    Moderate persistent asthma without complication 04/19/2021    GERD without esophagitis 04/19/2021        Review of Systems   Constitutional:  Negative for fatigue and fever.   HENT:  Negative for congestion, hearing loss, postnasal drip, rhinorrhea, sore throat, tinnitus and voice change.    Respiratory:  Negative for apnea, cough, choking, chest tightness and shortness of breath.    Cardiovascular:  Negative for chest pain, palpitations and leg swelling.   Gastrointestinal:  Negative for abdominal pain, constipation, diarrhea and nausea.   Genitourinary:  Negative for difficulty urinating.   Neurological:  Negative for dizziness, syncope, weakness and headaches.   Psychiatric/Behavioral:  Negative for sleep disturbance.                    Objective:      Vitals:    10/30/23 1310   BP: 134/81   Pulse: 71   Resp: 18      Physical Exam     Procedures   Assessment:       1. Essential hypertension,  benign    2. Vitamin D deficiency    3. Dysuria    4. Prediabetes    5. Environmental allergies    6. Depression, unspecified depression type    7. Need for vaccination    8. Encounter for screening for HIV        Plan:     Problem List Items Addressed This Visit          Psychiatric    Depression    Relevant Medications    brexpiprazole (REXULTI) 1 mg Tab    EScitalopram oxalate (LEXAPRO) 20 MG tablet       Cardiac/Vascular    Essential hypertension, benign - Primary (Chronic)    Current Assessment & Plan     Take HCTZ daily- K level pending- may need supplement.  Stop Norvasc.          Relevant Medications    hydroCHLOROthiazide (HYDRODIURIL) 12.5 MG Tab    Other Relevant Orders    Basic Metabolic Panel    CBC Auto Differential       Renal/    Dysuria    Current Assessment & Plan     U/A with culture- pending.         Relevant Orders    Urinalysis, Reflex to Urine Culture       ID    Need for vaccination    Relevant Orders    Influenza - Quadrivalent (PF)    Encounter for screening for HIV    Relevant Orders    HIV 1/2 Ag/Ab (4th Gen)       Endocrine    Prediabetes (Chronic)    Current Assessment & Plan     A1C today- Pending  Weight loss and dietary adjustments for better A1C control.         Relevant Orders    Hemoglobin A1C    Vitamin D deficiency    Relevant Orders    Vitamin D       Other    Environmental allergies (Chronic)    Current Assessment & Plan     The current medical regimen is effective; continue present plan and medications.           Relevant Medications    cetirizine (ZYRTEC) 10 MG tablet       Health Maintenance:  Health Maintenance Topics with due status: Not Due       Topic Last Completion Date    Colorectal Cancer Screening 07/07/2016    Hemoglobin A1c (Prediabetes) 04/06/2023    Lipid Panel 04/06/2023           Ayleen Casas   [facility]   10/30/23

## 2023-10-30 NOTE — PROGRESS NOTES
"Subjective     Patient ID: Sheri Ho is a 58 y.o. female.    Chief Complaint: Healthy You (Patient is in for a healthy you) and Establish Care (Patient is in for healthy you , to establish care and also to get flu shot )    Ms. Ho presents to clinic to establish care and for routine lab work.  She also mentions itching and burning with urination x 3 days.   Reports recently stopping Wellbutrin and replaced with "adipex" per Dr. Lanier. Will request records.      Review of Systems   Constitutional:  Negative for chills, fatigue and fever.   HENT:  Negative for nasal congestion and trouble swallowing.    Respiratory:  Negative for cough, choking and shortness of breath.    Cardiovascular:  Negative for chest pain, palpitations, leg swelling and claudication.   Gastrointestinal:  Positive for reflux. Negative for constipation and nausea.   Genitourinary:  Positive for dysuria and frequency. Negative for hematuria and urgency.       Tobacco Use: Low Risk  (10/30/2023)    Patient History     Smoking Tobacco Use: Never     Smokeless Tobacco Use: Never     Passive Exposure: Not on file     Review of patient's allergies indicates:   Allergen Reactions    Codeine Nausea And Vomiting and Swelling     Current Outpatient Medications   Medication Instructions    albuterol (PROVENTIL/VENTOLIN HFA) 90 mcg/actuation inhaler 2 puffs, Inhalation, Every 4 hours PRN    albuterol sulfate 180 mcg, Inhalation, Every 6 hours PRN    aspirin (ECOTRIN) 81 mg, Oral, Daily    brexpiprazole (REXULTI) 1 mg Tab One a day for depression    budesonide-formoterol 160-4.5 mcg (SYMBICORT) 160-4.5 mcg/actuation HFAA 2 puffs, Inhalation, 2 times daily    cetirizine (ZYRTEC) 10 mg, Oral, Daily    cyclobenzaprine (FLEXERIL) 10 mg, Oral, 3 times daily    EScitalopram oxalate (LEXAPRO) 20 mg, Oral, Daily    hydrALAZINE (APRESOLINE) 10 mg, Oral, 2 times daily    hydroCHLOROthiazide (HYDRODIURIL) 12.5 mg, Oral, Daily    HYDROcodone-acetaminophen " (NORCO)  mg per tablet 1 tablet, Oral, Every 8 hours PRN    hydrOXYzine HCL (ATARAX) 10 MG Tab 1-2 tabs Q6hrs prn for nerves, itching, insomnia, or nausea    ibuprofen (ADVIL,MOTRIN) 800 mg, Oral, 2 times daily    LINZESS 145 mcg Cap capsule TAKE 1 CAPSULE DAILY BEFORE BREAKFAST.    loteprednol (LOTEMAX) 0.5 % ophthalmic suspension 1 drop, As directed     MIRABEGRON ORAL Oral    multivitamin (MULTIPLE VITAMINS ORAL) 1 tablet, Oral, Daily, OTC: w/ Iron & Folic Acid     mupirocin (BACTROBAN) 2 % ointment Topical (Top), Daily    pantoprazole (PROTONIX) 40 MG tablet TAKE 1 TABLET ONE TIME DAILY    progesterone (PROMETRIUM) 200 MG capsule 1 capsule, Oral, Nightly    propranoloL (INDERAL) 40 MG tablet TAKE 1 TABLET TWICE DAILY    traZODone (DESYREL) 50 mg, Oral, Nightly PRN     Medications Discontinued During This Encounter   Medication Reason    amLODIPine (NORVASC) 5 MG tablet Patient no longer taking    buPROPion (WELLBUTRIN SR) 100 MG TBSR 12 hr tablet Patient no longer taking    predniSONE (DELTASONE) 20 MG tablet Patient no longer taking    cetirizine (ZYRTEC) 10 MG tablet Reorder    hydroCHLOROthiazide (HYDRODIURIL) 12.5 MG Tab Reorder    brexpiprazole (REXULTI) 1 mg Tab Reorder    EScitalopram oxalate (LEXAPRO) 20 MG tablet Reorder       Past Medical History:   Diagnosis Date    Arthritis     Esophageal dysphagia 4/29/2021    Gastroparesis 7/29/2022    GERD (gastroesophageal reflux disease)     Mitral insufficiency     Panic attacks     SOB (shortness of breath)      Health Maintenance Topics with due status: Not Due       Topic Last Completion Date    Colorectal Cancer Screening 07/07/2016    Hemoglobin A1c (Prediabetes) 04/06/2023    Lipid Panel 04/06/2023     Immunization History   Administered Date(s) Administered    COVID-19, MRNA, LN-S, PF (MODERNA FULL 0.5 ML DOSE) 03/16/2021, 04/13/2021       Objective     Body mass index is 33.91 kg/m².  Wt Readings from Last 3 Encounters:   10/30/23 92.4 kg (203 lb  "12.8 oz)   07/19/23 97.5 kg (215 lb)   07/14/23 97.1 kg (214 lb)     Ht Readings from Last 3 Encounters:   10/30/23 5' 5" (1.651 m)   07/19/23 5' 5" (1.651 m)   07/14/23 5' 5" (1.651 m)     BP Readings from Last 3 Encounters:   10/30/23 134/81   07/19/23 139/79   07/14/23 (!) 144/78     Temp Readings from Last 3 Encounters:   07/19/23 98.8 °F (37.1 °C) (Oral)   07/14/23 98.7 °F (37.1 °C) (Oral)   04/06/23 97.8 °F (36.6 °C) (Oral)     Pulse Readings from Last 3 Encounters:   10/30/23 71   07/19/23 64   07/14/23 73     Resp Readings from Last 3 Encounters:   10/30/23 18   07/19/23 14   07/14/23 18     PF Readings from Last 3 Encounters:   04/19/21 420 L/min       Physical Exam  Constitutional:       Appearance: Normal appearance. She is well-developed and normal weight.   HENT:      Head: Normocephalic.      Right Ear: External ear normal.      Left Ear: External ear normal.      Nose: Nose normal.      Mouth/Throat:      Lips: Pink.      Mouth: Mucous membranes are moist.      Pharynx: Oropharynx is clear.   Eyes:      General: Lids are normal.      Pupils: Pupils are equal, round, and reactive to light.   Cardiovascular:      Rate and Rhythm: Normal rate and regular rhythm.      Pulses: Normal pulses.      Heart sounds: Normal heart sounds.   Pulmonary:      Effort: Pulmonary effort is normal.      Breath sounds: Normal breath sounds.   Abdominal:      Palpations: Abdomen is soft.   Musculoskeletal:         General: Normal range of motion.      Cervical back: Normal range of motion.   Skin:     General: Skin is warm and dry.   Neurological:      Mental Status: She is alert and oriented to person, place, and time.   Psychiatric:         Attention and Perception: Attention normal.         Mood and Affect: Mood normal.         Speech: Speech normal.         Behavior: Behavior normal. Behavior is cooperative.         Assessment and Plan     Problem List Items Addressed This Visit          Psychiatric    Depression    " Relevant Medications    brexpiprazole (REXULTI) 1 mg Tab    EScitalopram oxalate (LEXAPRO) 20 MG tablet       Cardiac/Vascular    Essential hypertension, benign - Primary (Chronic)     Take HCTZ daily- K level pending- may need supplement.  Stop Norvasc.          Relevant Medications    hydroCHLOROthiazide (HYDRODIURIL) 12.5 MG Tab    Other Relevant Orders    Basic Metabolic Panel    CBC Auto Differential       Renal/    Dysuria     U/A with culture- pending.         Relevant Orders    Urinalysis, Reflex to Urine Culture       ID    Need for vaccination    Relevant Orders    Influenza - Quadrivalent (PF)    Encounter for screening for HIV    Relevant Orders    HIV 1/2 Ag/Ab (4th Gen)       Endocrine    Prediabetes (Chronic)     A1C today- Pending  Weight loss and dietary adjustments for better A1C control.         Relevant Orders    Hemoglobin A1C    Vitamin D deficiency    Relevant Orders    Vitamin D       Other    Environmental allergies (Chronic)     The current medical regimen is effective; continue present plan and medications.           Relevant Medications    cetirizine (ZYRTEC) 10 MG tablet     Other Visit Diagnoses       Screening for lipoid disorders        Screening for diabetes mellitus        Relevant Orders    Basic Metabolic Panel            Plan: See above.      I have reviewed the medications, allergies, and problem list.

## 2023-10-31 ENCOUNTER — ANESTHESIA (OUTPATIENT)
Dept: GASTROENTEROLOGY | Facility: HOSPITAL | Age: 58
End: 2023-10-31
Payer: MEDICARE

## 2023-10-31 ENCOUNTER — ANESTHESIA EVENT (OUTPATIENT)
Dept: GASTROENTEROLOGY | Facility: HOSPITAL | Age: 58
End: 2023-10-31
Payer: MEDICARE

## 2023-10-31 ENCOUNTER — HOSPITAL ENCOUNTER (OUTPATIENT)
Dept: GASTROENTEROLOGY | Facility: HOSPITAL | Age: 58
Discharge: HOME | End: 2023-10-31
Attending: NURSE PRACTITIONER
Payer: MEDICARE

## 2023-10-31 VITALS
DIASTOLIC BLOOD PRESSURE: 73 MMHG | RESPIRATION RATE: 19 BRPM | TEMPERATURE: 99 F | OXYGEN SATURATION: 94 % | HEART RATE: 64 BPM | SYSTOLIC BLOOD PRESSURE: 123 MMHG

## 2023-10-31 DIAGNOSIS — K20.90 ESOPHAGITIS: ICD-10-CM

## 2023-10-31 DIAGNOSIS — K21.9 GERD WITHOUT ESOPHAGITIS: ICD-10-CM

## 2023-10-31 DIAGNOSIS — K29.00 ACUTE SUPERFICIAL GASTRITIS WITHOUT HEMORRHAGE: ICD-10-CM

## 2023-10-31 DIAGNOSIS — R13.10 DYSPHAGIA, UNSPECIFIED TYPE: ICD-10-CM

## 2023-10-31 DIAGNOSIS — R13.19 ESOPHAGEAL DYSPHAGIA: Primary | ICD-10-CM

## 2023-10-31 PROCEDURE — 88305 TISSUE EXAM BY PATHOLOGIST: CPT | Mod: TC,SUR | Performed by: INTERNAL MEDICINE

## 2023-10-31 PROCEDURE — D9220A PRA ANESTHESIA: Mod: ,,, | Performed by: NURSE ANESTHETIST, CERTIFIED REGISTERED

## 2023-10-31 PROCEDURE — 27000284 HC CANNULA NASAL: Performed by: NURSE ANESTHETIST, CERTIFIED REGISTERED

## 2023-10-31 PROCEDURE — D9220A PRA ANESTHESIA: ICD-10-PCS | Mod: ,,, | Performed by: NURSE ANESTHETIST, CERTIFIED REGISTERED

## 2023-10-31 PROCEDURE — 43450 PR DILATE ESOPHAGUS: ICD-10-PCS | Mod: 51,,, | Performed by: INTERNAL MEDICINE

## 2023-10-31 PROCEDURE — 37000008 HC ANESTHESIA 1ST 15 MINUTES

## 2023-10-31 PROCEDURE — 25000003 PHARM REV CODE 250: Performed by: NURSE ANESTHETIST, CERTIFIED REGISTERED

## 2023-10-31 PROCEDURE — 43239 EGD BIOPSY SINGLE/MULTIPLE: CPT | Mod: ,,, | Performed by: INTERNAL MEDICINE

## 2023-10-31 PROCEDURE — 63600175 PHARM REV CODE 636 W HCPCS: Performed by: NURSE ANESTHETIST, CERTIFIED REGISTERED

## 2023-10-31 PROCEDURE — 88305 TISSUE EXAM BY PATHOLOGIST: CPT | Mod: 26,,, | Performed by: PATHOLOGY

## 2023-10-31 PROCEDURE — 88342 IMHCHEM/IMCYTCHM 1ST ANTB: CPT | Mod: 26,,, | Performed by: PATHOLOGY

## 2023-10-31 PROCEDURE — 43450 DILATE ESOPHAGUS 1/MULT PASS: CPT | Mod: 51,,, | Performed by: INTERNAL MEDICINE

## 2023-10-31 PROCEDURE — 43450 DILATE ESOPHAGUS 1/MULT PASS: CPT | Performed by: INTERNAL MEDICINE

## 2023-10-31 PROCEDURE — 43239 EGD BIOPSY SINGLE/MULTIPLE: CPT | Performed by: INTERNAL MEDICINE

## 2023-10-31 PROCEDURE — 88305 SURGICAL PATHOLOGY: ICD-10-PCS | Mod: 26,,, | Performed by: PATHOLOGY

## 2023-10-31 PROCEDURE — 43239 PR EGD, FLEX, W/BIOPSY, SGL/MULTI: ICD-10-PCS | Mod: ,,, | Performed by: INTERNAL MEDICINE

## 2023-10-31 PROCEDURE — 88342 SURGICAL PATHOLOGY: ICD-10-PCS | Mod: 26,,, | Performed by: PATHOLOGY

## 2023-10-31 PROCEDURE — 27201423 OPTIME MED/SURG SUP & DEVICES STERILE SUPPLY

## 2023-10-31 RX ORDER — PROPOFOL 10 MG/ML
VIAL (ML) INTRAVENOUS
Status: DISCONTINUED | OUTPATIENT
Start: 2023-10-31 | End: 2023-10-31

## 2023-10-31 RX ORDER — LIDOCAINE HYDROCHLORIDE 20 MG/ML
INJECTION, SOLUTION EPIDURAL; INFILTRATION; INTRACAUDAL; PERINEURAL
Status: DISCONTINUED | OUTPATIENT
Start: 2023-10-31 | End: 2023-10-31

## 2023-10-31 RX ORDER — PANTOPRAZOLE SODIUM 40 MG/1
40 TABLET, DELAYED RELEASE ORAL DAILY
Qty: 90 TABLET | Refills: 3 | Status: SHIPPED | OUTPATIENT
Start: 2023-10-31

## 2023-10-31 RX ORDER — SODIUM CHLORIDE 0.9 % (FLUSH) 0.9 %
10 SYRINGE (ML) INJECTION
Status: DISCONTINUED | OUTPATIENT
Start: 2023-10-31 | End: 2023-11-01 | Stop reason: HOSPADM

## 2023-10-31 RX ORDER — SULFAMETHOXAZOLE AND TRIMETHOPRIM 800; 160 MG/1; MG/1
1 TABLET ORAL 2 TIMES DAILY
Qty: 20 TABLET | Refills: 0 | Status: SHIPPED | OUTPATIENT
Start: 2023-10-31 | End: 2023-11-10

## 2023-10-31 RX ADMIN — PROPOFOL 40 MG: 10 INJECTION, EMULSION INTRAVENOUS at 12:10

## 2023-10-31 RX ADMIN — LIDOCAINE HYDROCHLORIDE 60 MG: 20 INJECTION, SOLUTION INTRAVENOUS at 12:10

## 2023-10-31 RX ADMIN — PROPOFOL 80 MG: 10 INJECTION, EMULSION INTRAVENOUS at 12:10

## 2023-10-31 RX ADMIN — SODIUM CHLORIDE: 9 INJECTION, SOLUTION INTRAVENOUS at 11:10

## 2023-10-31 NOTE — H&P
[facility]  Gastroenterology  H&P    Patient Name: Sheri Ho  MRN: [id number]  Admission Date: 10/31/2023  Code Status: Prior    Attending Provider: Jaye Howe FNP   Primary Care Physician: Calvin Guido III, MD  Principal Problem:<principal problem not specified>    Subjective:     History of Present Illness: Pt c/o esophageal dysphagia; for egd with dilation.    Past Medical History:   Diagnosis Date    Arthritis     Esophageal dysphagia 4/29/2021    Gastroparesis 7/29/2022    GERD (gastroesophageal reflux disease)     Mitral insufficiency     Panic attacks     SOB (shortness of breath)        Past Surgical History:   Procedure Laterality Date    ARTHROSCOPIC REPAIR OF ROTATOR CUFF OF SHOULDER Right 11/30/2021    Procedure: REPAIR, ROTATOR CUFF, ARTHROSCOPIC;  Surgeon: Cain Treviño MD;  Location: [facility] OR;  Service: Orthopedics;  Laterality: Right;    BLOCK, NERVE, SHOULDER Left 7/19/2023    Procedure: BLOCK, NERVE, SHOULDER;  Surgeon: Bernard Ellington MD;  Location: [facility];  Service: Pain Management;  Laterality: Left;  Left shoulder NB    CHOLECYSTECTOMY      COLONOSCOPY      CYSTOSCOPY      ESOPHAGOGASTRODUODENOSCOPY      HYSTERECTOMY      LIPOMA RESECTION N/A 09/02/2022    Procedure: EXCISION, BACK LIPOMA;  Surgeon: Zane Crowe DO;  Location: [facility] OR;  Service: General;  Laterality: N/A;    OOPHORECTOMY      ROTATOR CUFF REPAIR      SHOULDER ARTHROSCOPY      SHOULDER ARTHROSCOPY Right 11/30/2021    Procedure: ARTHROSCOPY, SHOULDER;  Surgeon: Cain Treviño MD;  Location: [facility] OR;  Service: Orthopedics;  Laterality: Right;       Review of patient's allergies indicates:   Allergen Reactions    Codeine Nausea And Vomiting and Swelling     Family History       Problem Relation (Age of Onset)    Breast cancer Cousin    Diabetes Father    Heart attack Father    Heart disease Father    Hypertension Mother, Father, Sister, Maternal  Grandmother          Tobacco Use    Smoking status: Never    Smokeless tobacco: Never   Substance and Sexual Activity    Alcohol use: Never    Drug use: Never    Sexual activity: Not on file     Review of Systems   HENT:  Positive for trouble swallowing.    Respiratory: Negative.     Cardiovascular: Negative.    Gastrointestinal: Negative.      Objective:     Vital Signs (Most Recent):  Pulse: 72 (10/31/23 1135)  Resp: 15 (10/31/23 1135)  BP: (!) 118/52 (10/31/23 1135)  SpO2: 97 % (10/31/23 1135) Vital Signs (24h Range):  Pulse:  [71-72] 72  Resp:  [15-18] 15  SpO2:  [97 %] 97 %  BP: (118-134)/(52-81) 118/52        There is no height or weight on file to calculate BMI.    No intake or output data in the 24 hours ending 10/31/23 1204    Lines/Drains/Airways       Peripheral Intravenous Line  Duration                  Peripheral IV - Single Lumen 10/31/23 1135 22 G Anterior;Distal;Right Forearm <1 day                    Physical Exam  Vitals reviewed.   Constitutional:       General: She is not in acute distress.     Appearance: Normal appearance. She is well-developed. She is not ill-appearing.   HENT:      Head: Normocephalic and atraumatic.      Nose: Nose normal.   Eyes:      Pupils: Pupils are equal, round, and reactive to light.   Cardiovascular:      Rate and Rhythm: Normal rate and regular rhythm.   Pulmonary:      Effort: Pulmonary effort is normal.      Breath sounds: Normal breath sounds. No wheezing.   Abdominal:      General: Abdomen is flat. Bowel sounds are normal. There is no distension.      Palpations: Abdomen is soft.      Tenderness: There is no abdominal tenderness. There is no guarding.   Skin:     General: Skin is warm and dry.      Coloration: Skin is not jaundiced.   Neurological:      Mental Status: She is alert.   Psychiatric:         Attention and Perception: Attention normal.         Mood and Affect: Affect normal.         Speech: Speech normal.         Behavior: Behavior is cooperative.       Comments: Pt was calm while speaking.         Significant Labs:  CBC:   Recent Labs   Lab 10/30/23  1340   WBC 4.39*   HGB 13.9   HCT 41.5        CMP:   Recent Labs   Lab 10/30/23  1340   GLU 95   CALCIUM 9.3      K 3.9   CO2 29      BUN 10   CREATININE 0.97       Significant Imaging:  Imaging results within the past 24 hours have been reviewed.    Assessment/Plan:     There are no hospital problems to display for this patient.        Imp: esophageal dysphagia  Plan; egd with dilation of esophagus    Jose A oCrrea MD  Gastroenterology  [facility] - Endoscopy

## 2023-10-31 NOTE — DISCHARGE INSTRUCTIONS
Procedure Date  10/31/23     Impression  Overall Impression:   Performed forceps biopsies in the esophagus and stomach  Dilation in the esophagus  Esophagitis in the lower third of the esophagus; performed cold forceps biopsy  Dilated in the esophagus with Siddiqui dilator  Erythematous mucosa in the fundus of the stomach and antrum; performed cold forceps biopsy  The distal esophagus has inflammation and biopsies were obtained. The esophagus was dilated with a 48F Siddiqui dilator. The stomach has gastric erythema without ulcers and biopsies were obtained. Mucosa of the duodenum was normal appearing.     Recommendation    Await pathology results      Avoid nsaids; PPI or H2RA daily x at least 8 weeks; repeat dilation of the esophagus prn.  Discharge: disp: DC to home. Resume diet. No driving x 24 hours. F/U with PCP as scheduled.  Dx; esophageal dysphagia, esophagitis, gastritis, s/p dilation of the esophagus.    THE NURSE WILL CALL YOU WITH YOUR BIOPSY RESULTS IN A FEW DAYS.     NO DRIVING, OPERATING EQUIPMENT, OR SIGNING LEGAL DOCUMENTS FOR 24 HOURS.

## 2023-10-31 NOTE — ANESTHESIA POSTPROCEDURE EVALUATION
Anesthesia Post Evaluation    Patient: Sheri Ho    Procedure(s) Performed: * EGD *    Final Anesthesia Type: general      Patient location during evaluation: GI PACU  Patient participation: Yes- Able to Participate  Level of consciousness: awake and alert  Post-procedure vital signs: reviewed and stable  Pain management: adequate  Airway patency: patent    PONV status at discharge: No PONV  Anesthetic complications: no      Cardiovascular status: blood pressure returned to baseline and hemodynamically stable  Respiratory status: spontaneous ventilation  Hydration status: euvolemic  Follow-up not needed.  Comments: Refer to nursing notes for pain/courtney score upon discharge from recovery.          Vitals Value Taken Time   /70 10/31/23 1252   Temp 98 10/31/23 1402   Pulse 67 10/31/23 1255   Resp 20 10/31/23 1255   SpO2 96 % 10/31/23 1255   Vitals shown include unvalidated device data.      Event Time   Out of Recovery 13:01:49         Pain/Courtney Score: Courtney Score: 10 (10/31/2023 12:33 PM)

## 2023-10-31 NOTE — TRANSFER OF CARE
Anesthesia Transfer of Care Note    Patient: Sheri Ho    Procedure(s) Performed: * EGD with biopsy *    Patient location: GI    Anesthesia Type: general    Transport from OR: Transported from OR on room air with adequate spontaneous ventilation. Continuous ECG monitoring in transport. Continuous SpO2 monitoring in transport    Post pain: adequate analgesia    Post assessment: no apparent anesthetic complications    Post vital signs: stable    Level of consciousness: sedated and responds to stimulation    Nausea/Vomiting: no nausea/vomiting    Complications: none    Transfer of care protocol was followedComments: Good SV continue, NAD, VSS, RTRN      Last vitals:   Visit Vitals  /79   Pulse 67   Temp 37 °C (98.6 °F)   Resp 14   SpO2 (!) 94%   Breastfeeding No

## 2023-10-31 NOTE — PROGRESS NOTES
Spoke with patient. Antibiotic to pharmacy. Continue weight loss with dietary adjustment. Repeat labs in 1 year.

## 2023-10-31 NOTE — ANESTHESIA PREPROCEDURE EVALUATION
10/31/2023  Sheri Ho is a 58 y.o., female.    Past Medical History:   Diagnosis Date    Arthritis     Esophageal dysphagia 4/29/2021    Gastroparesis 7/29/2022    GERD (gastroesophageal reflux disease)     Mitral insufficiency     Panic attacks     SOB (shortness of breath)        Past Surgical History:   Procedure Laterality Date    ARTHROSCOPIC REPAIR OF ROTATOR CUFF OF SHOULDER Right 11/30/2021    Procedure: REPAIR, ROTATOR CUFF, ARTHROSCOPIC;  Surgeon: Cain Treviño MD;  Location: [facility] OR;  Service: Orthopedics;  Laterality: Right;    BLOCK, NERVE, SHOULDER Left 7/19/2023    Procedure: BLOCK, NERVE, SHOULDER;  Surgeon: Bernard Ellington MD;  Location: [facility];  Service: Pain Management;  Laterality: Left;  Left shoulder NB    CHOLECYSTECTOMY      COLONOSCOPY      CYSTOSCOPY      ESOPHAGOGASTRODUODENOSCOPY      HYSTERECTOMY      LIPOMA RESECTION N/A 09/02/2022    Procedure: EXCISION, BACK LIPOMA;  Surgeon: Zane Crowe DO;  Location: [facility] OR;  Service: General;  Laterality: N/A;    OOPHORECTOMY      ROTATOR CUFF REPAIR      SHOULDER ARTHROSCOPY      SHOULDER ARTHROSCOPY Right 11/30/2021    Procedure: ARTHROSCOPY, SHOULDER;  Surgeon: Cain Treviño MD;  Location: [facility] OR;  Service: Orthopedics;  Laterality: Right;       Family History   Problem Relation Age of Onset    Hypertension Mother     Diabetes Father     Hypertension Father     Heart disease Father     Heart attack Father     Hypertension Sister     Hypertension Maternal Grandmother     Breast cancer Cousin        Social History     Socioeconomic History    Marital status:    Tobacco Use    Smoking status: Never    Smokeless tobacco: Never   Substance and Sexual Activity    Alcohol use: Never    Drug use: Never       Current Outpatient Medications    Medication Sig Dispense Refill    albuterol (PROVENTIL/VENTOLIN HFA) 90 mcg/actuation inhaler Inhale 2 puffs into the lungs every 4 (four) hours as needed for Wheezing. 54 g 3    albuterol sulfate 90 mcg/actuation aebs Inhale 180 mcg into the lungs every 6 (six) hours as needed.      aspirin (ECOTRIN) 81 MG EC tablet Take 81 mg by mouth once daily.      brexpiprazole (REXULTI) 1 mg Tab One a day for depression 90 tablet 3    budesonide-formoterol 160-4.5 mcg (SYMBICORT) 160-4.5 mcg/actuation HFAA Inhale 2 puffs into the lungs 2 (two) times a day. 30.6 g 3    cetirizine (ZYRTEC) 10 MG tablet Take 1 tablet (10 mg total) by mouth once daily. 90 tablet 3    cyclobenzaprine (FLEXERIL) 10 MG tablet Take 10 mg by mouth 3 (three) times daily.      EScitalopram oxalate (LEXAPRO) 20 MG tablet Take 1 tablet (20 mg total) by mouth once daily. 90 tablet 3    hydrALAZINE (APRESOLINE) 10 MG tablet Take 1 tablet (10 mg total) by mouth 2 (two) times daily. 180 tablet 3    hydroCHLOROthiazide (HYDRODIURIL) 12.5 MG Tab Take 1 tablet (12.5 mg total) by mouth once daily. 90 tablet 3    HYDROcodone-acetaminophen (NORCO)  mg per tablet Take 1 tablet by mouth every 8 (eight) hours as needed.      hydrOXYzine HCL (ATARAX) 10 MG Tab 1-2 tabs Q6hrs prn for nerves, itching, insomnia, or nausea 120 tablet 5    ibuprofen (ADVIL,MOTRIN) 800 MG tablet Take 1 tablet (800 mg total) by mouth 2 (two) times a day. 180 tablet 1    LINZESS 145 mcg Cap capsule TAKE 1 CAPSULE DAILY BEFORE BREAKFAST. 90 capsule 3    loteprednol (LOTEMAX) 0.5 % ophthalmic suspension 1 drop. As directed      MIRABEGRON ORAL Take by mouth.      multivitamin (MULTIPLE VITAMINS ORAL) Take 1 tablet by mouth once daily. OTC: w/ Iron & Folic Acid      mupirocin (BACTROBAN) 2 % ointment Apply topically once daily. 30 g 12    pantoprazole (PROTONIX) 40 MG tablet TAKE 1 TABLET ONE TIME DAILY 90 tablet 3    progesterone (PROMETRIUM) 200 MG capsule Take 1  capsule by mouth every evening.      propranoloL (INDERAL) 40 MG tablet TAKE 1 TABLET TWICE DAILY 180 tablet 3    sulfamethoxazole-trimethoprim 800-160mg (BACTRIM DS) 800-160 mg Tab Take 1 tablet by mouth 2 (two) times daily. for 10 days 20 tablet 0    traZODone (DESYREL) 50 MG tablet Take 50 mg by mouth nightly as needed.       No current facility-administered medications for this encounter.     Facility-Administered Medications Ordered in Other Encounters   Medication Dose Route Frequency Provider Last Rate Last Admin    0.9%  NaCl infusion   Intravenous Continuous Cain Treviño  mL/hr at 09/02/22 1002 New Bag at 07/19/23 3138       Review of patient's allergies indicates:   Allergen Reactions    Codeine Nausea And Vomiting and Swelling     Patient Active Problem List   Diagnosis    Moderate persistent asthma without complication    GERD without esophagitis    Dysphagia    S/P right rotator cuff repair    Constipation    Abdominal pain    Nontraumatic complete tear of right rotator cuff    Liver fibrosis    Essential hypertension, benign    Encounter for long-term (current) use of other medications    Dysuria    Chronic fatigue    Moderate episode of recurrent major depressive disorder    Panic attacks    Prediabetes    Gastroparesis    Fatty liver    Vitamin D deficiency    Need for vaccination    Encounter for screening for HIV    Environmental allergies    Depression       Pre-op Assessment    I have reviewed the Patient Summary Reports.     I have reviewed the Nursing Notes. I have reviewed the NPO Status.   I have reviewed the Medications.     Review of Systems  Anesthesia Hx:  No problems with previous Anesthesia    Cardiovascular:   Hypertension    Hepatic/GI:   GERD    Endocrine:  Obesity / BMI > 30  Psych:   Psychiatric History          Physical Exam  General: Well nourished, Alert, Oriented and Cooperative    Airway:  Mallampati: II   Mouth Opening: Normal  Neck ROM:  Normal ROM    Dental:  Intact    Chest/Lungs:  Normal Respiratory Rate    Heart:  Rate: Normal        Anesthesia Plan  Type of Anesthesia, risks & benefits discussed:    Anesthesia Type: Gen Natural Airway, MAC  Intra-op Monitoring Plan: Standard ASA Monitors  Post Op Pain Control Plan: multimodal analgesia and IV/PO Opioids PRN  Induction:  IV  Informed Consent: Informed consent signed with the Patient and all parties understand the risks and agree with anesthesia plan.  All questions answered. Patient consented to blood products? Yes  ASA Score: 3  Day of Surgery Review of History & Physical: I have interviewed and examined the patient. I have reviewed the patient's H&P dated: There are no significant changes.     Ready For Surgery From Anesthesia Perspective.     .

## 2023-11-01 LAB
DHEA SERPL-MCNC: NORMAL
ESTROGEN SERPL-MCNC: NORMAL PG/ML
INSULIN SERPL-ACNC: NORMAL U[IU]/ML
LAB AP GROSS DESCRIPTION: NORMAL
LAB AP LABORATORY NOTES: NORMAL
T3RU NFR SERPL: NORMAL %

## 2023-11-01 NOTE — PROGRESS NOTES
EGD biopsies show gastritis and reflux esophagitis without H.pylori.  Recommend: avoid nsaids; continue acid blocking meds as needed.

## 2023-11-06 ENCOUNTER — OFFICE VISIT (OUTPATIENT)
Dept: GASTROENTEROLOGY | Facility: CLINIC | Age: 58
End: 2023-11-06
Payer: MEDICARE

## 2023-11-06 VITALS
WEIGHT: 204 LBS | SYSTOLIC BLOOD PRESSURE: 112 MMHG | HEIGHT: 67 IN | HEART RATE: 78 BPM | BODY MASS INDEX: 32.02 KG/M2 | RESPIRATION RATE: 18 BRPM | DIASTOLIC BLOOD PRESSURE: 68 MMHG

## 2023-11-06 DIAGNOSIS — K74.00 LIVER FIBROSIS: ICD-10-CM

## 2023-11-06 DIAGNOSIS — R10.9 ABDOMINAL PAIN, UNSPECIFIED ABDOMINAL LOCATION: Primary | ICD-10-CM

## 2023-11-06 DIAGNOSIS — K76.0 FATTY LIVER: ICD-10-CM

## 2023-11-06 PROCEDURE — 1159F PR MEDICATION LIST DOCUMENTED IN MEDICAL RECORD: ICD-10-PCS | Mod: CPTII,,, | Performed by: NURSE PRACTITIONER

## 2023-11-06 PROCEDURE — 3074F PR MOST RECENT SYSTOLIC BLOOD PRESSURE < 130 MM HG: ICD-10-PCS | Mod: CPTII,,, | Performed by: NURSE PRACTITIONER

## 2023-11-06 PROCEDURE — 3078F PR MOST RECENT DIASTOLIC BLOOD PRESSURE < 80 MM HG: ICD-10-PCS | Mod: CPTII,,, | Performed by: NURSE PRACTITIONER

## 2023-11-06 PROCEDURE — 1160F RVW MEDS BY RX/DR IN RCRD: CPT | Mod: CPTII,,, | Performed by: NURSE PRACTITIONER

## 2023-11-06 PROCEDURE — 1160F PR REVIEW ALL MEDS BY PRESCRIBER/CLIN PHARMACIST DOCUMENTED: ICD-10-PCS | Mod: CPTII,,, | Performed by: NURSE PRACTITIONER

## 2023-11-06 PROCEDURE — 1159F MED LIST DOCD IN RCRD: CPT | Mod: CPTII,,, | Performed by: NURSE PRACTITIONER

## 2023-11-06 PROCEDURE — 3078F DIAST BP <80 MM HG: CPT | Mod: CPTII,,, | Performed by: NURSE PRACTITIONER

## 2023-11-06 PROCEDURE — 3008F PR BODY MASS INDEX (BMI) DOCUMENTED: ICD-10-PCS | Mod: CPTII,,, | Performed by: NURSE PRACTITIONER

## 2023-11-06 PROCEDURE — 99215 OFFICE O/P EST HI 40 MIN: CPT | Mod: PBBFAC | Performed by: NURSE PRACTITIONER

## 2023-11-06 PROCEDURE — 3008F BODY MASS INDEX DOCD: CPT | Mod: CPTII,,, | Performed by: NURSE PRACTITIONER

## 2023-11-06 PROCEDURE — 3044F HG A1C LEVEL LT 7.0%: CPT | Mod: CPTII,,, | Performed by: NURSE PRACTITIONER

## 2023-11-06 PROCEDURE — 3074F SYST BP LT 130 MM HG: CPT | Mod: CPTII,,, | Performed by: NURSE PRACTITIONER

## 2023-11-06 PROCEDURE — 3044F PR MOST RECENT HEMOGLOBIN A1C LEVEL <7.0%: ICD-10-PCS | Mod: CPTII,,, | Performed by: NURSE PRACTITIONER

## 2023-11-06 PROCEDURE — 99214 PR OFFICE/OUTPT VISIT, EST, LEVL IV, 30-39 MIN: ICD-10-PCS | Mod: S$PBB,,, | Performed by: NURSE PRACTITIONER

## 2023-11-06 PROCEDURE — 99214 OFFICE O/P EST MOD 30 MIN: CPT | Mod: S$PBB,,, | Performed by: NURSE PRACTITIONER

## 2023-11-06 RX ORDER — PHENTERMINE HYDROCHLORIDE 37.5 MG/1
37.5 CAPSULE ORAL EVERY MORNING
COMMUNITY

## 2023-11-06 NOTE — PROGRESS NOTES
"    Sheri Ho is a 58 y.o. female here for Gastroesophageal Reflux and Constipation        PCP: Calvin Guido III  Referring Provider: Fidelia Lanier Do  [id number] [place] [id number]     HPI:  Presents for follow up after EGD. EGD/Dilation on 10/31/23, mild reflux and gastritis. No H pylori.  Patient is reporting generalized abdominal pain.  She has chronic constipation.  Is taking Linzess.  No hematochezia or melena.  Patient is very nervous in the office today.  Is rocking back and forth during the exam.  States that she stopped going to [facility] for awhile but is going to restart appointments.  Patient is concerned about abdominal pain.  States that one of her friends told her that she may have a problem with her intestine.  No particular trigger for abdominal pain.  No nausea or vomiting.  No weight loss.  Last colonoscopy was 07/07/2016, no colon polyps.  Recommendation to repeat colonoscopy in 10 years.  She does have a history of fatty liver with liver fibrosis.  Metavir score of F2 to F3. Patient has a portosystemic "natural shunt".     Gastroesophageal Reflux  She complains of abdominal pain. She reports no chest pain or no nausea. Pertinent negatives include no fatigue.   Constipation  Associated symptoms include abdominal pain. Pertinent negatives include no diarrhea, fever, nausea or vomiting.         ROS:  Review of Systems   Constitutional:  Negative for appetite change, fatigue, fever and unexpected weight change.   HENT:  Negative for trouble swallowing.    Respiratory:  Negative for shortness of breath.    Cardiovascular:  Negative for chest pain.   Gastrointestinal:  Positive for abdominal pain, constipation and reflux. Negative for anal bleeding, blood in stool, change in bowel habit, diarrhea, nausea and vomiting.   Musculoskeletal:  Negative for gait problem.   Integumentary:  Negative for pallor.   Psychiatric/Behavioral:  The patient is nervous/anxious.           PMHX:  " has a past medical history of Arthritis, Esophageal dysphagia (4/29/2021), Gastroparesis (7/29/2022), GERD (gastroesophageal reflux disease), Mitral insufficiency, Panic attacks, and SOB (shortness of breath).    PSHX:  has a past surgical history that includes Cholecystectomy; Colonoscopy; Esophagogastroduodenoscopy; Hysterectomy; Shoulder arthroscopy; Cystoscopy; Rotator cuff repair; Shoulder arthroscopy (Right, 11/30/2021); Arthroscopic repair of rotator cuff of shoulder (Right, 11/30/2021); Oophorectomy; Lipoma resection (N/A, 09/02/2022); and block, nerve, shoulder (Left, 7/19/2023).    PFHX: family history includes Breast cancer in her cousin; Diabetes in her father; Heart attack in her father; Heart disease in her father; Hypertension in her father, maternal grandmother, mother, and sister.    PSlHX:  reports that she has never smoked. She has never used smokeless tobacco. She reports that she does not drink alcohol and does not use drugs.        Review of patient's allergies indicates:   Allergen Reactions    Codeine Nausea And Vomiting and Swelling       Medication List with Changes/Refills   Current Medications    ALBUTEROL (PROVENTIL/VENTOLIN HFA) 90 MCG/ACTUATION INHALER    Inhale 2 puffs into the lungs every 4 (four) hours as needed for Wheezing.    ALBUTEROL SULFATE 90 MCG/ACTUATION AEBS    Inhale 180 mcg into the lungs every 6 (six) hours as needed.    ASPIRIN (ECOTRIN) 81 MG EC TABLET    Take 81 mg by mouth once daily.    BREXPIPRAZOLE (REXULTI) 1 MG TAB    One a day for depression    BUDESONIDE-FORMOTEROL 160-4.5 MCG (SYMBICORT) 160-4.5 MCG/ACTUATION HFAA    Inhale 2 puffs into the lungs 2 (two) times a day.    CETIRIZINE (ZYRTEC) 10 MG TABLET    Take 1 tablet (10 mg total) by mouth once daily.    CYCLOBENZAPRINE (FLEXERIL) 10 MG TABLET    Take 10 mg by mouth 3 (three) times daily.    ESCITALOPRAM OXALATE (LEXAPRO) 20 MG TABLET    Take 1 tablet (20 mg total) by mouth once daily.    HYDRALAZINE  "(APRESOLINE) 10 MG TABLET    Take 1 tablet (10 mg total) by mouth 2 (two) times daily.    HYDROCHLOROTHIAZIDE (HYDRODIURIL) 12.5 MG TAB    Take 1 tablet (12.5 mg total) by mouth once daily.    HYDROCODONE-ACETAMINOPHEN (NORCO)  MG PER TABLET    Take 1 tablet by mouth every 8 (eight) hours as needed.    HYDROXYZINE HCL (ATARAX) 10 MG TAB    1-2 tabs Q6hrs prn for nerves, itching, insomnia, or nausea    IBUPROFEN (ADVIL,MOTRIN) 800 MG TABLET    Take 1 tablet (800 mg total) by mouth 2 (two) times a day.    LINZESS 145 MCG CAP CAPSULE    TAKE 1 CAPSULE DAILY BEFORE BREAKFAST.    LOTEPREDNOL (LOTEMAX) 0.5 % OPHTHALMIC SUSPENSION    1 drop. As directed    MIRABEGRON ORAL    Take by mouth.    MULTIVITAMIN (MULTIPLE VITAMINS ORAL)    Take 1 tablet by mouth once daily. OTC: w/ Iron & Folic Acid    MUPIROCIN (BACTROBAN) 2 % OINTMENT    Apply topically once daily.    PANTOPRAZOLE (PROTONIX) 40 MG TABLET    Take 1 tablet (40 mg total) by mouth once daily.    PHENTERMINE (ADIPEX-P) 37.5 MG CAPSULE    Take 37.5 mg by mouth every morning.    PROGESTERONE (PROMETRIUM) 200 MG CAPSULE    Take 1 capsule by mouth every evening.    PROPRANOLOL (INDERAL) 40 MG TABLET    TAKE 1 TABLET TWICE DAILY    SULFAMETHOXAZOLE-TRIMETHOPRIM 800-160MG (BACTRIM DS) 800-160 MG TAB    Take 1 tablet by mouth 2 (two) times daily. for 10 days    TRAZODONE (DESYREL) 50 MG TABLET    Take 50 mg by mouth nightly as needed.        Objective Findings:  Vital Signs:  /68   Pulse 78   Resp 18   Ht 5' 7" (1.702 m)   Wt 92.5 kg (204 lb)   BMI 31.95 kg/m²  Body mass index is 31.95 kg/m².    Physical Exam:  Physical Exam  Vitals and nursing note reviewed.   Constitutional:       General: She is not in acute distress.     Appearance: Normal appearance.   HENT:      Mouth/Throat:      Mouth: Mucous membranes are moist.   Cardiovascular:      Rate and Rhythm: Normal rate.   Pulmonary:      Effort: Pulmonary effort is normal.      Breath sounds: No " wheezing, rhonchi or rales.   Abdominal:      General: Bowel sounds are normal. There is no distension.      Palpations: Abdomen is soft. There is no mass.      Tenderness: There is no abdominal tenderness. There is no guarding.   Skin:     General: Skin is warm and dry.      Coloration: Skin is not jaundiced or pale.   Neurological:      Mental Status: She is alert and oriented to person, place, and time.   Psychiatric:         Mood and Affect: Mood normal.          Labs:  Lab Results   Component Value Date    WBC 4.39 (L) 10/30/2023    HGB 13.9 10/30/2023    HCT 41.5 10/30/2023    MCV 90.6 10/30/2023    RDW 12.3 10/30/2023     10/30/2023    LYMPH 46.5 (H) 10/30/2023    LYMPH 2.04 10/30/2023    MONO 10.3 (H) 10/30/2023    EOS 0.03 10/30/2023    BASO 0.04 10/30/2023     Lab Results   Component Value Date     10/30/2023    K 3.9 10/30/2023     10/30/2023    CO2 29 10/30/2023    GLU 95 10/30/2023    BUN 10 10/30/2023    CREATININE 0.97 10/30/2023    CALCIUM 9.3 10/30/2023    PROT 7.4 11/06/2023    ALBUMIN 3.6 11/06/2023    BILITOT 0.7 11/06/2023    ALKPHOS 102 11/06/2023    AST 19 11/06/2023    ALT 28 11/06/2023         Imaging: EGD w Dilation    Result Date: 10/31/2023  Table formatting from the original result was not included. Procedure Date 10/31/23 Impression Overall      Performed forceps biopsies in the esophagus and stomach Dilation in the esophagus Esophagitis in the lower third of the esophagus; performed cold forceps biopsy Dilated in the esophagus with Siddiqui dilator Erythematous mucosa in the fundus of the stomach and antrum; performed cold forceps biopsy The distal esophagus has inflammation and biopsies were obtained. The esophagus was dilated with a 48F Siddiqui dilator. The stomach has gastric erythema without ulcers and biopsies were obtained. Mucosa of the duodenum was normal appearing. Recommendation  Await pathology results Avoid nsaids; PPI or H2RA daily x at least 8 weeks;  repeat dilation of the esophagus prn. Discharge: disp: DC to home. Resume diet. No driving x 24 hours. F/U with PCP as scheduled. Dx; esophageal dysphagia, esophagitis, gastritis, s/p dilation of the esophagus. Indication GERD without esophagitis, Dysphagia, unspecified type Providers Nancy Foy Technician Annette Mayers, RN Registered Nurse Julio Cesar Gomez CRNA CRNA Dungan, Vincent C., MD Proceduralist Sabrina Thorne RN Registered Nurse Medications Moderate sedation administered by anesthesia staff - See anesthesia record. Preprocedure A history and physical has been performed, and patient medication allergies have been reviewed. The patient's tolerance of previous anesthesia has been reviewed. The risks and benefits of the procedure and the sedation options and risks were discussed with the patient. All questions were answered and informed consent obtained. ASA Score: ASA 2 - Patient with mild systemic disease with no functional limitations Mallampati Airway Score: II (hard and soft palate, upper portion of tonsils anduvula visible) Details of the Procedure The patient underwent monitored anesthesia care, which was administered by an anesthesia professional. The patient's blood pressure, heart rate, level of consciousness, oxygen, respirations, ECG and ETCO2 were monitored throughout the procedure. The scope was introduced through the mouth and advanced to the third part of the duodenum. Retroflexion was performed in the cardia. Prior to the procedure, the patient's H. Pylori status was unknown. The patient's estimated blood loss was minimal (<5 mL). The procedure was not difficult. The patient tolerated the procedure well. There were no apparent adverse events. Scope: Gastroscope Scope Serial: [id number] Events Procedure Events Event Event Time Procedure Events Event Event Time SCOPE IN 10/31/2023 12:24 PM SCOPE OUT 10/31/2023 12:28 PM Findings Performed multiple forceps biopsies in the esophagus  and stomach Dilation in the esophagus. 48 Fr Esophagitis, showing edematous and erythematous mucosa in the lower third of the esophagus; performed cold forceps biopsy Dilated in the esophagus with Siddiqui dilator Erythematous mucosa in the fundus of the stomach and antrum; performed cold forceps biopsy         Assessment:  Sheri Ho is a 58 y.o. female here with:  1. Abdominal pain, unspecified abdominal location    2. Fatty liver    3. Liver fibrosis          Recommendations:  1. Schedule CT abdomen and pelvis  2. Exercise 150 minutes per week  Weight loss of 7-10%. Weight loss should be gradual  Diet low in saturated fats and carbohydrates  Good glucose and cholesterol control  3. Follow-up at [facility]    Follow up in about 6 weeks (around 12/18/2023).      Order summary:  Orders Placed This Encounter    CT Abdomen Pelvis With IV Contrast    Hepatic Function Panel       Thank you for allowing me to participate in the care of Sheri Ho.      KOKO Gastelum

## 2023-11-08 ENCOUNTER — TELEPHONE (OUTPATIENT)
Dept: FAMILY MEDICINE | Facility: CLINIC | Age: 58
End: 2023-11-08
Payer: MEDICARE

## 2023-11-08 NOTE — TELEPHONE ENCOUNTER
Hold Rexulti d/t possible interaction with Norco.  Patient has not been taking Rexulti for 3 weeks. She understands to call/come to clinic with any changes in mood.

## 2023-11-20 ENCOUNTER — HOSPITAL ENCOUNTER (OUTPATIENT)
Dept: RADIOLOGY | Facility: HOSPITAL | Age: 58
Discharge: HOME | End: 2023-11-20
Attending: NURSE PRACTITIONER
Payer: MEDICARE

## 2023-11-20 DIAGNOSIS — R10.9 ABDOMINAL PAIN, UNSPECIFIED ABDOMINAL LOCATION: ICD-10-CM

## 2023-11-20 PROCEDURE — 25500020 PHARM REV CODE 255: Performed by: NURSE PRACTITIONER

## 2023-11-20 PROCEDURE — 74177 CT ABD & PELVIS W/CONTRAST: CPT | Mod: TC

## 2023-11-20 PROCEDURE — 74177 CT ABDOMEN PELVIS WITH IV CONTRAST: ICD-10-PCS | Mod: 26,,, | Performed by: RADIOLOGY

## 2023-11-20 PROCEDURE — 74177 CT ABD & PELVIS W/CONTRAST: CPT | Mod: 26,,, | Performed by: RADIOLOGY

## 2023-11-20 RX ADMIN — IOPAMIDOL 100 ML: 755 INJECTION, SOLUTION INTRAVENOUS at 08:11

## 2023-12-29 ENCOUNTER — OFFICE VISIT (OUTPATIENT)
Dept: GASTROENTEROLOGY | Facility: CLINIC | Age: 58
End: 2023-12-29
Payer: MEDICARE

## 2023-12-29 VITALS
WEIGHT: 206.69 LBS | HEART RATE: 88 BPM | BODY MASS INDEX: 32.44 KG/M2 | SYSTOLIC BLOOD PRESSURE: 123 MMHG | DIASTOLIC BLOOD PRESSURE: 76 MMHG | HEIGHT: 67 IN

## 2023-12-29 DIAGNOSIS — K21.9 GERD WITHOUT ESOPHAGITIS: ICD-10-CM

## 2023-12-29 DIAGNOSIS — K31.84 GASTROPARESIS: ICD-10-CM

## 2023-12-29 DIAGNOSIS — K76.0 FATTY LIVER: Primary | ICD-10-CM

## 2023-12-29 DIAGNOSIS — K74.00 LIVER FIBROSIS: ICD-10-CM

## 2023-12-29 PROCEDURE — 1160F RVW MEDS BY RX/DR IN RCRD: CPT | Mod: CPTII,,, | Performed by: NURSE PRACTITIONER

## 2023-12-29 PROCEDURE — 99214 PR OFFICE/OUTPT VISIT, EST, LEVL IV, 30-39 MIN: ICD-10-PCS | Mod: S$PBB,,, | Performed by: NURSE PRACTITIONER

## 2023-12-29 PROCEDURE — 3008F PR BODY MASS INDEX (BMI) DOCUMENTED: ICD-10-PCS | Mod: CPTII,,, | Performed by: NURSE PRACTITIONER

## 2023-12-29 PROCEDURE — 1159F PR MEDICATION LIST DOCUMENTED IN MEDICAL RECORD: ICD-10-PCS | Mod: CPTII,,, | Performed by: NURSE PRACTITIONER

## 2023-12-29 PROCEDURE — 99215 OFFICE O/P EST HI 40 MIN: CPT | Mod: PBBFAC | Performed by: NURSE PRACTITIONER

## 2023-12-29 PROCEDURE — 3078F PR MOST RECENT DIASTOLIC BLOOD PRESSURE < 80 MM HG: ICD-10-PCS | Mod: CPTII,,, | Performed by: NURSE PRACTITIONER

## 2023-12-29 PROCEDURE — 3044F PR MOST RECENT HEMOGLOBIN A1C LEVEL <7.0%: ICD-10-PCS | Mod: CPTII,,, | Performed by: NURSE PRACTITIONER

## 2023-12-29 PROCEDURE — 1160F PR REVIEW ALL MEDS BY PRESCRIBER/CLIN PHARMACIST DOCUMENTED: ICD-10-PCS | Mod: CPTII,,, | Performed by: NURSE PRACTITIONER

## 2023-12-29 PROCEDURE — 3074F SYST BP LT 130 MM HG: CPT | Mod: CPTII,,, | Performed by: NURSE PRACTITIONER

## 2023-12-29 PROCEDURE — 3044F HG A1C LEVEL LT 7.0%: CPT | Mod: CPTII,,, | Performed by: NURSE PRACTITIONER

## 2023-12-29 PROCEDURE — 3008F BODY MASS INDEX DOCD: CPT | Mod: CPTII,,, | Performed by: NURSE PRACTITIONER

## 2023-12-29 PROCEDURE — 1159F MED LIST DOCD IN RCRD: CPT | Mod: CPTII,,, | Performed by: NURSE PRACTITIONER

## 2023-12-29 PROCEDURE — 3078F DIAST BP <80 MM HG: CPT | Mod: CPTII,,, | Performed by: NURSE PRACTITIONER

## 2023-12-29 PROCEDURE — 99214 OFFICE O/P EST MOD 30 MIN: CPT | Mod: S$PBB,,, | Performed by: NURSE PRACTITIONER

## 2023-12-29 PROCEDURE — 3074F PR MOST RECENT SYSTOLIC BLOOD PRESSURE < 130 MM HG: ICD-10-PCS | Mod: CPTII,,, | Performed by: NURSE PRACTITIONER

## 2023-12-29 NOTE — PROGRESS NOTES
"    Sheri Ho is a 58 y.o. female here for No chief complaint on file.        PCP: Calvin Guido III  Referring Provider: No referring provider defined for this encounter.     HPI:  Presents for follow-up after CT abdomen and pelvis.  CT abdomen and pelvis was performed on 11/20/2023, no acute pathology.  Portal/hepatic venous shunt again demonstrated along the falciform ligament. Prior cholecystectomy.  Prior hysterectomy.  EGD dilation on 10/31/2023, mild reflux, gastritis.  No H pylori.  Reports that she occasionally has abdominal discomfort but feels that it is her "nerves".  Patient has a history of anxiety and depression.  She has previously been followed by [facility].  States that she is due to follow-up.  She also has a history of fatty liver and liver fibrosis.  Metavir score F2 and F3 on 11/22/2021.  Hepatic function panel on 11/06 is normal.  Last colonoscopy was 07/07/2016, no colon polyps, recommendation to repeat in 10 years.  Is currently taking Linzess for constipation.  Reports that this does improve her constipation symptoms.           ROS:  Review of Systems   Constitutional:  Negative for appetite change, fatigue, fever and unexpected weight change.   HENT:  Negative for trouble swallowing.    Gastrointestinal:  Positive for constipation. Negative for abdominal pain, blood in stool, change in bowel habit, diarrhea, nausea, vomiting and reflux.   Musculoskeletal:  Negative for gait problem.   Integumentary:  Negative for pallor.   Psychiatric/Behavioral:  The patient is nervous/anxious.           PMHX:  has a past medical history of Arthritis, Esophageal dysphagia (4/29/2021), Gastroparesis (7/29/2022), GERD (gastroesophageal reflux disease), Mitral insufficiency, Panic attacks, and SOB (shortness of breath).    PSHX:  has a past surgical history that includes Cholecystectomy; Colonoscopy; Esophagogastroduodenoscopy; Hysterectomy; Shoulder arthroscopy; Cystoscopy; Rotator cuff repair; " Shoulder arthroscopy (Right, 11/30/2021); Arthroscopic repair of rotator cuff of shoulder (Right, 11/30/2021); Oophorectomy; Lipoma resection (N/A, 09/02/2022); and block, nerve, shoulder (Left, 7/19/2023).    PFHX: family history includes Breast cancer in her cousin; Diabetes in her father; Heart attack in her father; Heart disease in her father; Hypertension in her father, maternal grandmother, mother, and sister.    PSlHX:  reports that she has never smoked. She has never used smokeless tobacco. She reports that she does not drink alcohol and does not use drugs.        Review of patient's allergies indicates:   Allergen Reactions    Codeine Nausea And Vomiting and Swelling       Medication List with Changes/Refills   Current Medications    ALBUTEROL (PROVENTIL/VENTOLIN HFA) 90 MCG/ACTUATION INHALER    Inhale 2 puffs into the lungs every 4 (four) hours as needed for Wheezing.    ALBUTEROL SULFATE 90 MCG/ACTUATION AEBS    Inhale 180 mcg into the lungs every 6 (six) hours as needed.    ASPIRIN (ECOTRIN) 81 MG EC TABLET    Take 81 mg by mouth once daily.    BUDESONIDE-FORMOTEROL 160-4.5 MCG (SYMBICORT) 160-4.5 MCG/ACTUATION HFAA    Inhale 2 puffs into the lungs 2 (two) times a day.    CETIRIZINE (ZYRTEC) 10 MG TABLET    Take 1 tablet (10 mg total) by mouth once daily.    CYCLOBENZAPRINE (FLEXERIL) 10 MG TABLET    Take 10 mg by mouth 3 (three) times daily.    ESCITALOPRAM OXALATE (LEXAPRO) 20 MG TABLET    Take 1 tablet (20 mg total) by mouth once daily.    HYDRALAZINE (APRESOLINE) 10 MG TABLET    Take 1 tablet (10 mg total) by mouth 2 (two) times daily.    HYDROCHLOROTHIAZIDE (HYDRODIURIL) 12.5 MG TAB    Take 1 tablet (12.5 mg total) by mouth once daily.    HYDROCODONE-ACETAMINOPHEN (NORCO)  MG PER TABLET    Take 1 tablet by mouth every 8 (eight) hours as needed.    HYDROXYZINE HCL (ATARAX) 10 MG TAB    1-2 tabs Q6hrs prn for nerves, itching, insomnia, or nausea    IBUPROFEN (ADVIL,MOTRIN) 800 MG TABLET    Take  "1 tablet (800 mg total) by mouth 2 (two) times a day.    LINZESS 145 MCG CAP CAPSULE    TAKE 1 CAPSULE DAILY BEFORE BREAKFAST.    LOTEPREDNOL (LOTEMAX) 0.5 % OPHTHALMIC SUSPENSION    1 drop. As directed    MIRABEGRON ORAL    Take by mouth.    MULTIVITAMIN (MULTIPLE VITAMINS ORAL)    Take 1 tablet by mouth once daily. OTC: w/ Iron & Folic Acid    MUPIROCIN (BACTROBAN) 2 % OINTMENT    Apply topically once daily.    PANTOPRAZOLE (PROTONIX) 40 MG TABLET    Take 1 tablet (40 mg total) by mouth once daily.    PHENTERMINE (ADIPEX-P) 37.5 MG CAPSULE    Take 37.5 mg by mouth every morning.    PROGESTERONE (PROMETRIUM) 200 MG CAPSULE    Take 1 capsule by mouth every evening.    PROPRANOLOL (INDERAL) 40 MG TABLET    TAKE 1 TABLET TWICE DAILY    TRAZODONE (DESYREL) 50 MG TABLET    Take 50 mg by mouth nightly as needed.        Objective Findings:  Vital Signs:  /76   Pulse 88   Ht 5' 7" (1.702 m)   Wt 93.8 kg (206 lb 11.2 oz)   BMI 32.37 kg/m²  Body mass index is 32.37 kg/m².    Physical Exam:  Physical Exam  Vitals and nursing note reviewed.   Constitutional:       General: She is not in acute distress.     Appearance: Normal appearance.   HENT:      Mouth/Throat:      Mouth: Mucous membranes are moist.   Cardiovascular:      Rate and Rhythm: Normal rate.   Pulmonary:      Effort: Pulmonary effort is normal.      Breath sounds: No wheezing, rhonchi or rales.   Abdominal:      General: Bowel sounds are normal. There is no distension.      Palpations: Abdomen is soft. There is no mass.      Tenderness: There is no abdominal tenderness. There is no guarding.      Hernia: No hernia is present.   Skin:     General: Skin is warm and dry.      Coloration: Skin is not jaundiced or pale.   Neurological:      Mental Status: She is alert and oriented to person, place, and time.   Psychiatric:         Mood and Affect: Mood normal.          Labs:  Lab Results   Component Value Date    WBC 4.39 (L) 10/30/2023    HGB 13.9 10/30/2023 "    HCT 41.5 10/30/2023    MCV 90.6 10/30/2023    RDW 12.3 10/30/2023     10/30/2023    LYMPH 46.5 (H) 10/30/2023    LYMPH 2.04 10/30/2023    MONO 10.3 (H) 10/30/2023    EOS 0.03 10/30/2023    BASO 0.04 10/30/2023     Lab Results   Component Value Date     10/30/2023    K 3.9 10/30/2023     10/30/2023    CO2 29 10/30/2023    GLU 95 10/30/2023    BUN 10 10/30/2023    CREATININE 0.97 10/30/2023    CALCIUM 9.3 10/30/2023    PROT 7.4 11/06/2023    ALBUMIN 3.6 11/06/2023    BILITOT 0.7 11/06/2023    ALKPHOS 102 11/06/2023    AST 19 11/06/2023    ALT 28 11/06/2023         Imaging: No results found.      Assessment:  Sheri Ho is a 58 y.o. female here with:  1. Fatty liver    2. Liver fibrosis    3. GERD without esophagitis    4. Gastroparesis          Recommendations:  1. Liver elastography  2. Exercise 150 minutes per week  Weight loss of 7-10%. Weight loss should be gradual  Diet low in saturated fats and carbohydrates  Good glucose and cholesterol control  3. Do not lay down within 3 hours of eating.  Avoid spicy, greasy foods  Eat 6-8 small meals per day.  Exercise 150 minutes per week  Avoid raw fruits and vegetables  Small amount of protein and fiber at one time      Follow up in about 6 months (around 6/29/2024).      Order summary:  Orders Placed This Encounter    US Elastography Liver w/imaging       Thank you for allowing me to participate in the care of Sheri Ho.      KOKO Gastelum

## 2024-01-09 DIAGNOSIS — Z71.89 COMPLEX CARE COORDINATION: ICD-10-CM

## 2024-01-17 ENCOUNTER — HOSPITAL ENCOUNTER (OUTPATIENT)
Dept: RADIOLOGY | Facility: HOSPITAL | Age: 59
Discharge: HOME | End: 2024-01-17
Attending: NURSE PRACTITIONER
Payer: MEDICARE

## 2024-01-17 DIAGNOSIS — K76.0 FATTY LIVER: ICD-10-CM

## 2024-01-17 DIAGNOSIS — K74.00 LIVER FIBROSIS: ICD-10-CM

## 2024-01-17 PROCEDURE — 76981 USE PARENCHYMA: CPT | Mod: TC

## 2024-01-17 PROCEDURE — 76981 USE PARENCHYMA: CPT | Mod: 26,,, | Performed by: RADIOLOGY

## 2024-02-20 DIAGNOSIS — I10 HYPERTENSION, UNSPECIFIED TYPE: ICD-10-CM

## 2024-02-20 RX ORDER — HYDRALAZINE HYDROCHLORIDE 10 MG/1
10 TABLET, FILM COATED ORAL 2 TIMES DAILY
Qty: 180 TABLET | Refills: 3 | Status: SHIPPED | OUTPATIENT
Start: 2024-02-20

## 2024-02-20 NOTE — TELEPHONE ENCOUNTER
Patient called and stated that she had all her prescriptions sent in through mail order and all her medicines came back except for her blood pressure medicine hydralazine 10 mg she stated that it did not come in the mail and that she is out and currently need a prescription sent in to [facility] pharmacy mail order thank you !

## 2024-03-14 ENCOUNTER — HOSPITAL ENCOUNTER (OUTPATIENT)
Dept: RADIOLOGY | Facility: HOSPITAL | Age: 59
Discharge: HOME | End: 2024-03-14
Payer: MEDICARE

## 2024-03-14 DIAGNOSIS — Z12.31 VISIT FOR SCREENING MAMMOGRAM: ICD-10-CM

## 2024-03-14 PROCEDURE — 77063 BREAST TOMOSYNTHESIS BI: CPT | Mod: 26,,, | Performed by: RADIOLOGY

## 2024-03-14 PROCEDURE — 77067 SCR MAMMO BI INCL CAD: CPT | Mod: 26,,, | Performed by: RADIOLOGY

## 2024-03-14 PROCEDURE — 77067 SCR MAMMO BI INCL CAD: CPT | Mod: TC

## 2024-04-09 ENCOUNTER — HOSPITAL ENCOUNTER (OUTPATIENT)
Dept: RADIOLOGY | Facility: HOSPITAL | Age: 59
Discharge: HOME | End: 2024-04-09
Attending: NURSE PRACTITIONER
Payer: MEDICARE

## 2024-04-09 DIAGNOSIS — M25.511 RIGHT SHOULDER PAIN: ICD-10-CM

## 2024-04-09 PROCEDURE — 73030 X-RAY EXAM OF SHOULDER: CPT | Mod: TC,RT

## 2024-04-09 PROCEDURE — 73030 X-RAY EXAM OF SHOULDER: CPT | Mod: 26,RT,, | Performed by: RADIOLOGY

## 2024-04-10 ENCOUNTER — HOSPITAL ENCOUNTER (OUTPATIENT)
Facility: HOSPITAL | Age: 59
Discharge: HOME | End: 2024-04-10
Attending: ANESTHESIOLOGY | Admitting: ANESTHESIOLOGY
Payer: MEDICARE

## 2024-04-10 ENCOUNTER — ANESTHESIA EVENT (OUTPATIENT)
Dept: PAIN MEDICINE | Facility: HOSPITAL | Age: 59
End: 2024-04-10
Payer: MEDICARE

## 2024-04-10 ENCOUNTER — ANESTHESIA (OUTPATIENT)
Dept: PAIN MEDICINE | Facility: HOSPITAL | Age: 59
End: 2024-04-10
Payer: MEDICARE

## 2024-04-10 VITALS
HEART RATE: 83 BPM | DIASTOLIC BLOOD PRESSURE: 72 MMHG | TEMPERATURE: 97 F | WEIGHT: 209 LBS | BODY MASS INDEX: 34.82 KG/M2 | OXYGEN SATURATION: 97 % | RESPIRATION RATE: 20 BRPM | HEIGHT: 65 IN | SYSTOLIC BLOOD PRESSURE: 136 MMHG

## 2024-04-10 DIAGNOSIS — M25.511 RIGHT SHOULDER PAIN: ICD-10-CM

## 2024-04-10 PROCEDURE — 64417 NJX AA&/STRD AX NERVE IMG: CPT | Mod: RT | Performed by: ANESTHESIOLOGY

## 2024-04-10 PROCEDURE — 27000284 HC CANNULA NASAL: Performed by: NURSE ANESTHETIST, CERTIFIED REGISTERED

## 2024-04-10 PROCEDURE — 63600175 PHARM REV CODE 636 W HCPCS: Mod: JZ,JG | Performed by: ANESTHESIOLOGY

## 2024-04-10 PROCEDURE — D9220A PRA ANESTHESIA: Mod: 23,,, | Performed by: NURSE ANESTHETIST, CERTIFIED REGISTERED

## 2024-04-10 PROCEDURE — 37000008 HC ANESTHESIA 1ST 15 MINUTES: Performed by: ANESTHESIOLOGY

## 2024-04-10 PROCEDURE — 64418 NJX AA&/STRD SPRSCAP NRV: CPT | Mod: RT | Performed by: ANESTHESIOLOGY

## 2024-04-10 PROCEDURE — 25000003 PHARM REV CODE 250: Performed by: NURSE ANESTHETIST, CERTIFIED REGISTERED

## 2024-04-10 PROCEDURE — 25000003 PHARM REV CODE 250: Performed by: ANESTHESIOLOGY

## 2024-04-10 PROCEDURE — 63600175 PHARM REV CODE 636 W HCPCS: Performed by: NURSE ANESTHETIST, CERTIFIED REGISTERED

## 2024-04-10 PROCEDURE — 64450 NJX AA&/STRD OTHER PN/BRANCH: CPT | Mod: RT | Performed by: ANESTHESIOLOGY

## 2024-04-10 RX ORDER — PROPOFOL 10 MG/ML
VIAL (ML) INTRAVENOUS
Status: DISCONTINUED | OUTPATIENT
Start: 2024-04-10 | End: 2024-04-10

## 2024-04-10 RX ORDER — SODIUM CHLORIDE 9 MG/ML
500 INJECTION, SOLUTION INTRAVENOUS CONTINUOUS
Status: DISCONTINUED | OUTPATIENT
Start: 2024-04-10 | End: 2024-04-10 | Stop reason: HOSPADM

## 2024-04-10 RX ORDER — BUPIVACAINE HYDROCHLORIDE 2.5 MG/ML
INJECTION, SOLUTION EPIDURAL; INFILTRATION; INTRACAUDAL CODE/TRAUMA/SEDATION MEDICATION
Status: DISCONTINUED | OUTPATIENT
Start: 2024-04-10 | End: 2024-04-10 | Stop reason: HOSPADM

## 2024-04-10 RX ORDER — LIDOCAINE HYDROCHLORIDE 20 MG/ML
INJECTION, SOLUTION EPIDURAL; INFILTRATION; INTRACAUDAL; PERINEURAL
Status: DISCONTINUED | OUTPATIENT
Start: 2024-04-10 | End: 2024-04-10

## 2024-04-10 RX ADMIN — PROPOFOL 50 MG: 10 INJECTION, EMULSION INTRAVENOUS at 09:04

## 2024-04-10 RX ADMIN — SODIUM CHLORIDE: 9 INJECTION, SOLUTION INTRAVENOUS at 09:04

## 2024-04-10 RX ADMIN — LIDOCAINE HYDROCHLORIDE 100 MG: 20 INJECTION, SOLUTION INTRAVENOUS at 09:04

## 2024-04-10 NOTE — OP NOTE
04/10/2024  Sheri Ho 1965    RIGHT SIDE SENSORY BRANCHES OF AXILLARY, SUPASCAPULAR, and  LATERAL PECTORAL Nerve Blocks     PREOPERATIVE DIAGNOSIS: Right shoulder pain      POSTOPERATIVE DIAGNOSIS: Right shoulder pain      PROCEDURE:  1. Suprascapular articular branch from the Suprascapular Nerve block  2. Axillary articular branch from the Axillary Nerve block  3. Lateral Pectoral articular branch from the Lateral Pectoral Nerve block     SURGEON: ROCIO SHABAZZ     ANESTHESIA: MAC     COMPLICATIONS: None     Blood loss: None      The patient was brought to the procedure room and placed on the exam table in a comfortable prone position for the posterior ablations of the Suprascapular and Axillary nerve branches. The target site for needle placement was obtained by manual palpation with radiographic confirmation. The sterile field was prepared using chloroprep and sterile drapes. Local anesthesia superficial and deep was provided by local infiltration of 0.25% bupivacaine (2.5mg/ml) 1ml. The patient was rotated after the completion of the posterior Suprascapular and Axillary nerve branch block  to a supine position to perform the anterior block of the Lateral Pectoral nerve branch. The target site for needle placement was obtained by manual palpation with radiographic confirmation. The sterile field was prepared using chloroprep and sterile drapes. Local anesthesia superficial and deep was provided by local infiltration of 0.25% bupivacaine(2.5mg/ml) 1 ml. A  25 gauge 1.5 inch needle was placed overlying the right shoulder joint. For the suprascapular target the introducer was placed superior to the spinoglenoid notch on the most lateral border of the glenoid fossa rim and then redirected inferiorly approximately 4mm for the block. For the axillary target the introducer was advanced to the inferior most lateral border of the greater tubercle and then repositioned approximately 4mm inferiorly for the  block. For the lateral pectoral target the introducer was placed at the midpoint of the coracoid process. All imaging was done using fluoroscopy. Attempted aspiration yielded no blood. Lateral xray views showed all the needles at proper depth.  Images were saved in AP and lateral. A 0.25% bupivacaine(2.5mg/ml) was slowly injected. The needles were withdrawn. There was adequate hemostasis at the conclusion of the procedure.  The patient tolerated the procedure well with no adverse events.  There was no paresthesia with needle placement or injection.  The patient was taken in stable condition to the holding area and was monitored for the appropriate time of convalescence and discharged to the care of the patients .   Pre pain  5/10  Post pain

## 2024-04-10 NOTE — PLAN OF CARE
Plan:  D/c pt via wheelchair at 1012  Informed pt if does not void in 8 hours to go to ER. Notify if redness, drainage, from injection site or fever over next 3-4 days. Rest and drink plenty of fluids for the remainder of the day. No lifting over 5 lbs. For the remainder of the day. Continue regular medications as prescribed. May take pain medications as prescribed.     Pain improved 100%  Pre-procedure pain: 5  Post-procedure pain: 0

## 2024-04-10 NOTE — ANESTHESIA PREPROCEDURE EVALUATION
04/10/2024  Sheri Ho is a 58 y.o., female.    Social History     Socioeconomic History    Marital status:    Tobacco Use    Smoking status: Never    Smokeless tobacco: Never   Substance and Sexual Activity    Alcohol use: Never    Drug use: Never      Pre-op Assessment    I have reviewed the Patient Summary Reports.     I have reviewed the Nursing Notes. I have reviewed the NPO Status.   I have reviewed the Medications.     Review of Systems  Anesthesia Hx:  No problems with previous Anesthesia                Social:  Non-Smoker       Cardiovascular:     Hypertension                                        Pulmonary:    Asthma  Shortness of breath                  Hepatic/GI:     GERD Liver Disease,            Psych:  Psychiatric History                Past Medical History:   Diagnosis Date    Arthritis     Esophageal dysphagia 4/29/2021    Gastroparesis 7/29/2022    GERD (gastroesophageal reflux disease)     Mitral insufficiency     Panic attacks     SOB (shortness of breath)       Past Surgical History:   Procedure Laterality Date    ARTHROSCOPIC REPAIR OF ROTATOR CUFF OF SHOULDER Right 11/30/2021    Procedure: REPAIR, ROTATOR CUFF, ARTHROSCOPIC;  Surgeon: Cain Treviño MD;  Location: [facility] OR;  Service: Orthopedics;  Laterality: Right;    BLOCK, NERVE, SHOULDER Left 7/19/2023    Procedure: BLOCK, NERVE, SHOULDER;  Surgeon: Bernard Ellington MD;  Location: [facility];  Service: Pain Management;  Laterality: Left;  Left shoulder NB    CHOLECYSTECTOMY      COLONOSCOPY      CYSTOSCOPY      ESOPHAGOGASTRODUODENOSCOPY      HYSTERECTOMY      LIPOMA RESECTION N/A 09/02/2022    Procedure: EXCISION, BACK LIPOMA;  Surgeon: Zane Crowe DO;  Location: [facility] OR;  Service: General;  Laterality: N/A;    OOPHORECTOMY      ROTATOR CUFF REPAIR      SHOULDER ARTHROSCOPY       SHOULDER ARTHROSCOPY Right 11/30/2021    Procedure: ARTHROSCOPY, SHOULDER;  Surgeon: Cain Treviño MD;  Location: [facility];  Service: Orthopedics;  Laterality: Right;        Physical Exam  General: Well nourished, Cooperative, Alert and Oriented    Airway:  Mallampati: II     Chest/Lungs:  Clear to auscultation    Heart:  Rate: Normal        Anesthesia Plan  Type of Anesthesia, risks & benefits discussed:    Anesthesia Type: Gen Natural Airway, MAC  Intra-op Monitoring Plan: Standard ASA Monitors  Post Op Pain Control Plan: multimodal analgesia and IV/PO Opioids PRN  Induction:  IV  Informed Consent: Informed consent signed with the Patient and all parties understand the risks and agree with anesthesia plan.  All questions answered. Patient consented to blood products? Yes  ASA Score: 3  Day of Surgery Review of History & Physical: I have interviewed and examined the patient. I have reviewed the patient's H&P dated: There are no significant changes.     Ready For Surgery From Anesthesia Perspective.     .

## 2024-04-10 NOTE — TRANSFER OF CARE
"Anesthesia Transfer of Care Note    Patient: Sheri Ho    Procedure(s) Performed: Procedure(s) (LRB):  BLOCK, NERVE, SHOULDER (Right)    Patient location: PACU    Anesthesia Type: general    Transport from OR: Transported from OR on room air with adequate spontaneous ventilation    Post pain: adequate analgesia    Post assessment: no apparent anesthetic complications    Post vital signs: stable    Level of consciousness: responds to stimulation    Nausea/Vomiting: no nausea/vomiting    Complications: none    Transfer of care protocol was followed      Last vitals: Visit Vitals  /73 (BP Location: Left arm, Patient Position: Lying)   Pulse 78   Temp 36.1 °C (97 °F) (Oral)   Resp 18   Ht 5' 5" (1.651 m)   Wt 94.8 kg (209 lb)   SpO2 97%   BMI 34.78 kg/m²     "

## 2024-04-10 NOTE — ANESTHESIA POSTPROCEDURE EVALUATION
Anesthesia Post Evaluation    Patient: Sheri Ho    Procedure(s) Performed: Procedure(s) (LRB):  BLOCK, NERVE, SHOULDER (Right)    Final Anesthesia Type: general      Patient location during evaluation: PACU  Patient participation: Yes- Able to Participate  Level of consciousness: responds to stimulation  Post-procedure vital signs: reviewed and stable  Pain management: adequate  Airway patency: patent  LIBIA mitigation strategies: Multimodal analgesia  PONV status at discharge: No PONV  Anesthetic complications: no      Cardiovascular status: hemodynamically stable  Respiratory status: unassisted, spontaneous ventilation and room air  Hydration status: euvolemic  Follow-up not needed.  Comments: The pt was not arousable even with painful stimulation during the procedure.   Refer to nursing note for pain/courtney score upon discharge from recovery.               Vitals Value Taken Time   /73 04/10/24 1000   Temp 36.1 °C (97 °F) 04/10/24 1000   Pulse 78 04/10/24 1000   Resp 18 04/10/24 1000   SpO2 97 % 04/10/24 1000         No case tracking events are documented in the log.      Pain/Courtney Score: Courtney Score: 10 (4/10/2024  9:50 AM)

## 2024-04-10 NOTE — DISCHARGE SUMMARY
Patient underwent RIGHT SIDE SENSORY BRANCHES OF AXILLARY, SUPASCAPULAR, and  LATERAL PECTORAL Nerve Blocks procedure 04/10/2024. The pt will follow up in clinic. Discharged home. Discharge Dx: Right shoulder pain

## 2024-04-10 NOTE — H&P
"[facility]  Pain Management  H&P    Patient Name: Sheri Ho  MRN: [id number]  Admission Date: 4/10/2024  Primary Care Provider: Ayleen Casas FNP    Patient information was obtained from     Subjective:     Principal Problem:     Slime MOE Zambrano FNP  [address]  [place] [address]  [phone]                   RE: Sheri Ho      : 1965   Date of Service: 3/19/2024   Procedure Follow-Up Bilateral Cervical C5-7 Radiofrequency Thermocoagulation of the Medial Branch Nerves pre 6 post 0 with 90% relief   Existing Patient           Chief Complaint:   Back pain constant in nature, sharp; located in the lumbar region, bilaterally; aggravated by activity; relieved by analgesics, rest; gradual in occurrence; pain rated as 8/10 on the pain scale,  Shoulder pain aching in nature, dull; left, radiating to arm; aggravated by activity; relieved by narcotics; gradual in onset constant; rated as 8/10 on pain scale.   Patient seated in room 7 with c/o bilateral shoulder and low back, reports pain is the same since last visit  [place] Prescription Monitoring Program data was reviewed for this patient for the past 12 calendar months to ascertain any current, or past use of scheduled medications.      History of Present Illness:   What part of the body? bilateral shoulder and low back   Pain level at best 5; Pain level at worst 8; Pain level at present 4; Pain level on average 6   57 y/o female with complaints of "low back pain"; objective data essentially unchanged from previous visit; states that pain improved by 90% after Bilateral C5-C7 RFTC  but continues to have R shoulder pain and pain into both arms to elbows; states that the colder weather has caused more pain; she is requesting a procedure for R shoulder; she has been using heat that helps some with pain; she is trying to lose weight and has actually lost about 15 lbs and has been taking Adipex that has helped some " with weight loss and is hoping that this will help with pain relief; she has been exercising some and watching her po intake; she is having some issues with her stomach and liver; states that pain improved by 100% after Left shoulder NB that lasted for about a week and is still having some pain relief; she has had her Bilateral C3-C7 RFTC in Feb 2023 that eventually improved pain by 80% and has been using heat, massage and meds that has helped; she has also been to see Dr. Treviño for a shoulder IA to Left shoulder that has helped some as well but at this point, would like to try something else; she had started PT that was ordered by Dr. Treviño that has caused more pain; she has found an additional tear to Left shoulder; her last MRI shoulder that last few months; he was able to have her Bilateral C3-C7 MBNB #1 that improved by 80% that lasted for about 3 weeks and was able to have her #2 with the same pain relief of of 85% for a couple of weeks; she is having more neck pain that radiates into both shoulders but has also had 2 surgeries on R shoulder almost a year ago and these have helped with pain; she completed PT for neck pain, she has had rotator cuff repair surgery on 11/30/2021 by Dr. Treviño; she had been to see Dr. Treviño and had an MRI of R shoulder that revealed a torn rotator cuff that required surgery; she is having worsening pain to R hip at this point that radiates into R groin; states that pain is unchanged from previous visit and meds are helping with pain; she was able to see Dr. Treviño to have Right Rotator cuff surgery in Feb 2021; she is still having some neck pain from a fall but was able to have the xray that revealed no fracture and multiple deg change; she is having more neck pain with numbness and tingling to hands and she has had injections but did not help with the pain and caused her to gain weight preferred not to have steroids at this point; she is having issues with holding her arm with  numbness; overall, her pain has been controlled with meds but has been having some constipation; she had her MRI that we ordered that revealed impingement and progressive changes and did see Dr. Treviño and was given an IA injection to L shoulder in Nov 2019 that helped some with pain but initially pain was worse after her IA injection; she really needs surgery for a bone spur but pt defers at this time; states that her shoulder pain is better with med but has been worse the past few weeks; overall, pain is controlled well with her meds but states that pain is worse at work while she is sitting for long periods; denies any issues with constipation but will occasionally use OTC meds that helps some and also states that she has some Miralax at home also; She denies any new pain symptoms today or any change in pain; she has had her antidepressant changed by Dr. Mathias and has been doing better     UDS: consistent x 20 (Klonopin on last UDS due to increased anxiety but does not have anymore left) inconsistent x 2 (no metabolites)  The previous urine drug screen was evaluated, and it was compliant for the medications that has been prescribed. A presumptive urine drug screen was done today to rapidly obtain and integrate results into clinical assessment and decision-making for ongoing safe prescribing of controlled substances. The results of the presumptive UDS done today was positive for opiates. She is prescribed hydrocodone. Because presumptive UDS positive results are not definitive due to sensitivity and specificity and cross reactivity limitations and negative results do not necessarily indicate absence of drugs or substances in the urine specimen, confirmation will identify specific prescribed and non-prescribed medications or illicit use for ongoing safe prescribing of controlled substances including benzodiazepines, opioid agonist, opioids antagonist, partial agonist, stimulants, muscle relaxers, antidepressants,  sleep aids, anti-seizure medicine, and alcohol. Urine drug analysis is used to assist with diagnosis and therapeutic decision-making concerning pretreatment assessment. Intensity and frequency of monitoring with urine drug testing will be based on the risk stratification method in determining risk level for opioid addiction.     Meds: Norco--due 2024; requests meds to be refilled and states that meds help with pain; no misuse of meds and no opioid abuse;  reviewed and is appropriate; discussed the new CDC guideline with pt and voiced understanding; she is currently taking 40 mg of morphine equivalent meds/day; she is using medical cannabis       Nursing:   Pain Medication/Dose/Last Taken/# Taken norco 10/325mg  no Physical Therapy  no Home Exercises      Allergies:   Allergies Reviewed - 24 9:06:56 AM CST  Codeine Sulfate       Current Medications:   Medications List Reviewed (24 9:06:45 AM CST)  diazePAM Oral Tablet 5 MG (2021) Take 1 tablet 30 minutes prior to MRI  Ibuprofen Oral Tablet 800 MG (2024) Take 1 tablet twice a day for 90 day(s)  Omeprazole Oral Capsule Delayed Release 20 MG (10/9/2018) Take 1 capsule once a day  Cyclobenzaprine HCl Oral Tablet 10 MG (2024) Take 1 tablet three times a day for 30 day(s)  Myrbetriq Oral Tablet Extended Release 24 Hour 50 MG (2017) Take 1 tablet every night at bedtime  Hydrocodone-Acetaminophen Oral Tablet  MG (3/19/2024) Take 1 tablet four times a day as needed for 30 day(s)  Albuterol Sulfate HFA Inhalation Aerosol Solution 108 (90 Base) MCG/ACT (2/10/2017)  Aspirin Oral Tablet Chewable 81 MG (2/10/2017)  AmLODIPine Besylate Oral Tablet 10 MG (2/10/2017)  BusPIRone HCl Oral Tablet 10 MG (2/10/2017)      Previous Studies:  MRI  Final Report  MR MRI SHOULDER LEFT W/O CONTRAST    Show Printer-Friendly Version Patient Name: Sheri Ho   : May-   ID: [id number]([facility])   Study Date: Dec- 10:24             MRI  of the left shoulder without contrast. Indication- Neck pain and left shoulder pain. Left arm pain. Comparison- July 28, 2010. Sagittal oblique STIR, sagittal oblique T1, coronal oblique STIR, coronal oblique T2 fat-sat, coronal oblique T2, coronal oblique T1, axial gradient echo and axial T2 fat sat. Degenerative changes are noted at the acromioclavicular joint, but they do not impinge on the rotator cuff. However the distal acromion is downsloping and does impinge on the rotator cuff. There is a small shoulder joint effusion. There is a small amount of fluid in the subacromion bursa, increased from the previous. There is tendinopathy involving the infraspinatus and supraspinatus tendons, without a complete full-thickness tear identified. The subscapularis tendon is intact. The biceps tendon is intact. There is mild increased fluid within its sheath. There is a small cystic area noted within the humeral head, anterior to the lesser tuberosity. There is degenerative change involving the glenoid labrum. Impression- Progressive degenerative changes. Development of a cystic area in the humeral head medial and anterior to the lesser tuberosity. Impingement. Subacromion bursa fluid. Tendinopathy of the infraspinatus and supraspinatus tendons, similar to mildly progressed from the previous. Place of service- [facility] This report has been electronically signed by Scott Morales - EMELI Nelson Radiologist- SCOTT MORALES M.D. Releasing Radiologist- SCOTT MORALES M.D. Released Date Time- 12/05/17 1047 ------------------------------------------------------------------------------    MRI [facility]  09/16/2022  Impression:  multilevel degenerative change of the cervical spine; circumferential disc bulge and facet change; severe right and and moderate left bilateral  neural foraminal narrowing    **there is fluid collection with a small punctate gas located within the SC soft tissues of the neck  at the level of T2-T4**  (this has been removed since MRI was done)      MRI Left Shoulder at [facility] open MRI  04/06/2023  Impression:  moderate AC joint OA. Partial tearing articular infraspinatus tendon    MRI Lspine at [facility]  02/06/2024  Impression:   Mild grade 1 spondylolisthesis at what is referred to as L4-L5 with slight lobulated diffuse bulge minimally more pronounced laterally on the right. there is  markef facet arthropathy on the left where there is surrounding edema and suspected erosions at the facet joint. There is mild facet arthropathy in the right with mild edema. there is mild canal and the minimal bilateral foraminal stenosis         ; X-RAY CSpine Xray at [place]     08/23/2021    Impression:    Multilevel degenerative changes and facet changes        Left Hip Xray at [facility]     09/01/2021    Impression    mild to moderate hip OA                Past Medical History:   The patient has a past medical history of  Hypertension, Diabetes Mellitus type 2, Atrial Fibrillation, Gastroesophageal Reflux Disease, Anxiety, High Cholesterol, Arthritis, Allergies/Hayfever, Joint Pain.  There is no past medical history of  Congestive Heart Failure, Coronary Artery Disease (CAD), Chronic Obstructive Pulmonary Disease, Rheumatoid Arthritis, Depression, Osteoporosis, Cardiovascular Disease, Kidney Disease, Liver Disease, Osteoarthritis (OA), Thyroid Disease, Terminal Illness.   Other past medical history includes allergies   severe dementianutritional quality good      Surgical History:   Tubal Ligation; Hysterectomy   Gallbladder and cyst from right hand      Social History:      Smoking Status: Never smoker; Last Reviewed: 03/19/2024                        Alcohol use: Non-Drinker  No drug use  Native language: English  Racial background:   Marital status:   Patient knows the purpose/use of medications  Patient is taking medications as prescribed               Family History:   Father  History  remarkable for  Coronary Artery Disease; Diabetes; Osteoarthritis; Arthritis.   Age 69; Alive      Mother  History remarkable for  Diabetes; Congestive Heart Failure; Thyroid Disease; ulcers on legs.   Age 66; Alive      Review of Systems:   General:  Patient denies  sweats, fatigue, fever, chills.  Ears, Nose and Throat:  Patient denies  ringing in the ears.  Cardiovascular:  Patient denies  chest pain.  Respiratory:  Patient denies  shortness of breath.  Gastrointestinal:   Patient admits to   constipation.  Patient denies  nausea, vomiting, diarrhea.  Has IBS  Genitourinary:  Patient denies  urinary frequency.  Endocrine:  Patient denies  thyroid problems.  Hematologic:  Patient denies  bleeding tendencies, easy bruising tendency.  Musculoskeletal:  Patient denies  joint pain, walking aids.  Neurologic  Patient denies  seizures, headache.  Psychologic:   Patient admits to   anxiety.  Patient denies  panic attacks, depression.  Treated by Dr. Jon  Skin:  Patient denies  skin rash.       DEPRESSION SCREENING:   Not at all the patient reports little interest or pleasure in doing things.  Not at all the patient reports feeling down, depressed, or hopeless.  Date Depression Screening Last Done: 01/02/2020   PHQ-2 Score 0; PHQ-9 Score incomplete   Date Depression Screening Last Done: 02/15/2017      Vital Signs:   Weight 209 lbs; Height 5 ft 7 in; BMI 32.7   03/19/2024 8:39 AM (CST)  Respiration Rate 18; Pulse Rate 68 bpm; Blood Pressure 142 / 87 mm/Hg; Pain Level: 8         Physical Examination:   Back Motion:   Lumbar / lumbosacral spine abnormal.         Additional Physical Findings:  General general appearance normal,   Oriented to time, place and person, pleasant, seated  Not uncomfortable  Head normal head exam  Eyes normal eye exam  Chest normal chest exam  Respiratory normal respiratory exam  Musculoskeletal abnormal,   Tenderness on palpation, Shoulders abnormal, joint tenderness, muscle tenderness  low  back pain  Neurologic normal neurologic exam  Skin normal skin exam       Toxicology Report   Toxicology was performed.   Reason for Toxicology:  A presumptive urine drug screen was done today to rapidly obtain and integrate results into clinical assessment and decision-making for ongoing safe prescribing of controlled substances.                                       Assessment:   (M19.90) - Osteoarthritis  (G89.29) - Chronic pain  (M54.50) - Low back pain  (M17.9) - Osteoarthritis of knee  (M47.817) - Lumbosacral spondylosis without myelopathy  (M47.812) - Cervical spondylosis without myelopathy  (G89.4) - Chronic pain syndrome  (M25.562) - Left knee pain  (M54.12) - Cervical radiculopathy  (M25.512) - Left shoulder pain  (S46.812A) - Tear of left infraspinatus tendon  (M54.16) - Lumbar radiculopathy  (M25.511) - Right shoulder pain      Plan:   Follow up visit 2 months      Physical Activity Counseling   Nutrition Counseling   -refilled Norco--due 04/14/2024  -continue with Miralax as needed  -drink plenty of fluids and water  -increase fiber in diet  -call sooner with worsening pain  -gave refills on Flexeril and Ibuprofen to [facility] with 5 RF (due again in July 2024)  -[facility]   -scheduled Right SIDE SENSORY BRANCHES OF AXILLARY, SUPASCAPULAR, and  LATERAL PECTORAL Nerve Block at [facility] on 04/10/2024 at 7:45 am   -gave an order for Right shoulder xray               Monitored Anesthesia Care medical necessity authorization request:   Monitor anesthesia request is medically indicated for the scheduled _Shoulder NB______procedure due to:     - needle phobia and anxiety, placing the patient at risk during the provided service.__YES___  - patient has a BMI greater than 45 ____  - patient has severe sleep apnea for which BiPAP and oxygen are needed while sleeping._____  - patient is unable to follow simple commands due to mental state.____  - patient has an ASA class greater than 3 and requires constant presence of  an anesthesiologist/CRNA during the procedure.____  - patient has severe problems with muscles and muscle spasticity that makes it hard to lie still. ____  - patient suffers from chronic pain and is unable to function due to diminished ADL's._YES___  - patient is dependent on opioids or sedatives. YES____   - Other _YES___      Patient will be scheduled for facet joint injections of the cervical spine. We have discussed the need for two diagnostically sound procedures before the radiofrequency ablation of the medial branch nerves can be scheduled. Pt has had 2 (+) diagnostic blocks schedule for Bilateral C3-7 RFTC.     ____2+ diagnostic MBB with each providing 80% relief of pain  Dates _Bilateral Cervical RF 02/2023_____ PIPA ___80__% and _____________PIPA____%                                                                                      OR  ____At least 50% improvement in the ability to perform previously painful movements and ADL's.        NARCOTIC STATEMENT  Patient is taking the narcotic pain medications as prescribed. Refill is being given because of the benefit to the patient in regards to the pain. Patient has agreed not to abuse of medication and not to take it more than what is prescribed. The nature of the drug including the potential for addiction and dependency and abuse was also discussed with the patient. Patient has developed physical dependency for the narcotic pain medication for his pain relief.  Patient has also developed tolerance to the sedative effect of the narcotic pain medications.  Patient has decided to continue with these medications despite potential for addiction as described by this office.  This was stressed to the patient that it is the patient's responsibility to secure the narcotic medication and in any event of loss for any reason whatsoever,  there will be no refill before the next due date. Patient also understands that they are not supposed to drive or work on machinery  while taking these medications.  Also explained to the patient that in the event of traffic citation, the presence of this drug in  bloodstream may result in DUI.  Patient has been advised not to drink alcohol while taking this medication.  Patient has verbalized understanding of our office policy and has signed a contract with us in this regard.      Orders Written Today:  XRAY: Right Shoulder --complete views   Prescriptions Written Today:  Hydrocodone-Acetaminophen Oral Tablet  MG  Take 1 tablet four times a day as needed for 30 day(s)  Refills: No Refills  Rx quantity: 120                 Slime Zambrano       Electronically signed: 3/19/2024 3:54:59 PM      Bernard Ellington MD      Electronically signed: 3/23/2024 2:38:06 PM               Chief Complaint:      HPI:       Assessment/Plan:         Bernard Ellington MD  Pain Management  [facility] - Pain Management

## 2024-04-30 ENCOUNTER — OFFICE VISIT (OUTPATIENT)
Dept: FAMILY MEDICINE | Facility: CLINIC | Age: 59
End: 2024-04-30
Payer: MEDICARE

## 2024-04-30 VITALS
HEIGHT: 65 IN | DIASTOLIC BLOOD PRESSURE: 68 MMHG | OXYGEN SATURATION: 88 % | BODY MASS INDEX: 34.97 KG/M2 | SYSTOLIC BLOOD PRESSURE: 123 MMHG | WEIGHT: 209.88 LBS | RESPIRATION RATE: 18 BRPM | HEART RATE: 84 BPM

## 2024-04-30 DIAGNOSIS — E55.9 VITAMIN D DEFICIENCY: ICD-10-CM

## 2024-04-30 DIAGNOSIS — R73.9 HYPERGLYCEMIA: ICD-10-CM

## 2024-04-30 DIAGNOSIS — J45.40 MODERATE PERSISTENT ASTHMA WITHOUT COMPLICATION: ICD-10-CM

## 2024-04-30 DIAGNOSIS — F33.1 MODERATE EPISODE OF RECURRENT MAJOR DEPRESSIVE DISORDER: ICD-10-CM

## 2024-04-30 DIAGNOSIS — I10 ESSENTIAL HYPERTENSION, BENIGN: Primary | ICD-10-CM

## 2024-04-30 DIAGNOSIS — R07.9 CHEST PAIN, UNSPECIFIED TYPE: ICD-10-CM

## 2024-04-30 DIAGNOSIS — R06.2 WHEEZE: ICD-10-CM

## 2024-04-30 PROBLEM — Z23 NEED FOR VACCINATION: Status: RESOLVED | Noted: 2023-10-30 | Resolved: 2024-04-30

## 2024-04-30 PROBLEM — Z11.4 ENCOUNTER FOR SCREENING FOR HIV: Status: RESOLVED | Noted: 2023-10-30 | Resolved: 2024-04-30

## 2024-04-30 LAB
25(OH)D3 SERPL-MCNC: 37.6 NG/ML
ALBUMIN SERPL BCP-MCNC: 3.5 G/DL (ref 3.5–5)
ALBUMIN/GLOB SERPL: 1 {RATIO}
ALP SERPL-CCNC: 94 U/L (ref 46–118)
ALT SERPL W P-5'-P-CCNC: 39 U/L (ref 13–56)
ANION GAP SERPL CALCULATED.3IONS-SCNC: 10 MMOL/L (ref 7–16)
AST SERPL W P-5'-P-CCNC: 28 U/L (ref 15–37)
BASOPHILS # BLD AUTO: 0.03 K/UL (ref 0–0.2)
BASOPHILS NFR BLD AUTO: 0.7 % (ref 0–1)
BILIRUB SERPL-MCNC: 0.7 MG/DL (ref ?–1.2)
BUN SERPL-MCNC: 6 MG/DL (ref 7–18)
BUN/CREAT SERPL: 7 (ref 6–20)
CALCIUM SERPL-MCNC: 9.4 MG/DL (ref 8.5–10.1)
CHLORIDE SERPL-SCNC: 105 MMOL/L (ref 98–107)
CO2 SERPL-SCNC: 27 MMOL/L (ref 21–32)
CREAT SERPL-MCNC: 0.87 MG/DL (ref 0.55–1.02)
DIFFERENTIAL METHOD BLD: ABNORMAL
EGFR (NO RACE VARIABLE) (RUSH/TITUS): 78 ML/MIN/1.73M2
EOSINOPHIL # BLD AUTO: 0.11 K/UL (ref 0–0.5)
EOSINOPHIL NFR BLD AUTO: 2.4 % (ref 1–4)
ERYTHROCYTE [DISTWIDTH] IN BLOOD BY AUTOMATED COUNT: 12.5 % (ref 11.5–14.5)
GLOBULIN SER-MCNC: 3.5 G/DL (ref 2–4)
GLUCOSE SERPL-MCNC: 161 MG/DL (ref 74–106)
HCT VFR BLD AUTO: 43.4 % (ref 38–47)
HGB BLD-MCNC: 14 G/DL (ref 12–16)
IMM GRANULOCYTES # BLD AUTO: 0.01 K/UL (ref 0–0.04)
IMM GRANULOCYTES NFR BLD: 0.2 % (ref 0–0.4)
LYMPHOCYTES # BLD AUTO: 2.41 K/UL (ref 1–4.8)
LYMPHOCYTES NFR BLD AUTO: 53.3 % (ref 27–41)
MCH RBC QN AUTO: 29.5 PG (ref 27–31)
MCHC RBC AUTO-ENTMCNC: 32.3 G/DL (ref 32–36)
MCV RBC AUTO: 91.4 FL (ref 80–96)
MONOCYTES # BLD AUTO: 0.37 K/UL (ref 0–0.8)
MONOCYTES NFR BLD AUTO: 8.2 % (ref 2–6)
MPC BLD CALC-MCNC: 10.6 FL (ref 9.4–12.4)
NEUTROPHILS # BLD AUTO: 1.59 K/UL (ref 1.8–7.7)
NEUTROPHILS NFR BLD AUTO: 35.2 % (ref 53–65)
NRBC # BLD AUTO: 0 X10E3/UL
NRBC, AUTO (.00): 0 %
PLATELET # BLD AUTO: 323 K/UL (ref 150–400)
POTASSIUM SERPL-SCNC: 3.6 MMOL/L (ref 3.5–5.1)
PROT SERPL-MCNC: 7 G/DL (ref 6.4–8.2)
RBC # BLD AUTO: 4.75 M/UL (ref 4.2–5.4)
SODIUM SERPL-SCNC: 138 MMOL/L (ref 136–145)
WBC # BLD AUTO: 4.52 K/UL (ref 4.5–11)

## 2024-04-30 PROCEDURE — 1160F RVW MEDS BY RX/DR IN RCRD: CPT | Mod: ,,, | Performed by: NURSE PRACTITIONER

## 2024-04-30 PROCEDURE — 83036 HEMOGLOBIN GLYCOSYLATED A1C: CPT | Mod: ,,, | Performed by: CLINICAL MEDICAL LABORATORY

## 2024-04-30 PROCEDURE — 99214 OFFICE O/P EST MOD 30 MIN: CPT | Mod: ,,, | Performed by: NURSE PRACTITIONER

## 2024-04-30 PROCEDURE — 85025 COMPLETE CBC W/AUTO DIFF WBC: CPT | Mod: ,,, | Performed by: CLINICAL MEDICAL LABORATORY

## 2024-04-30 PROCEDURE — 3074F SYST BP LT 130 MM HG: CPT | Mod: ,,, | Performed by: NURSE PRACTITIONER

## 2024-04-30 PROCEDURE — 80053 COMPREHEN METABOLIC PANEL: CPT | Mod: ,,, | Performed by: CLINICAL MEDICAL LABORATORY

## 2024-04-30 PROCEDURE — 3008F BODY MASS INDEX DOCD: CPT | Mod: ,,, | Performed by: NURSE PRACTITIONER

## 2024-04-30 PROCEDURE — 1159F MED LIST DOCD IN RCRD: CPT | Mod: ,,, | Performed by: NURSE PRACTITIONER

## 2024-04-30 PROCEDURE — 82306 VITAMIN D 25 HYDROXY: CPT | Mod: ,,, | Performed by: CLINICAL MEDICAL LABORATORY

## 2024-04-30 PROCEDURE — 3078F DIAST BP <80 MM HG: CPT | Mod: ,,, | Performed by: NURSE PRACTITIONER

## 2024-04-30 RX ORDER — ALBUTEROL SULFATE 90 UG/1
2 AEROSOL, METERED RESPIRATORY (INHALATION) EVERY 4 HOURS PRN
Qty: 54 G | Refills: 3 | Status: SHIPPED | OUTPATIENT
Start: 2024-04-30

## 2024-04-30 NOTE — ASSESSMENT & PLAN NOTE
The current medical regimen is effective;  continue present plan and medications.  Inhaler RX to pharm

## 2024-04-30 NOTE — ASSESSMENT & PLAN NOTE
Stop HCTZ  Monitor BP and keep recordings.   Follow up in 4 weeks or sooner if needed.   Continue Propranolol and Hydralazine as prescribed.

## 2024-04-30 NOTE — PROGRESS NOTES
"Subjective:       Patient ID: Sheri Ho is a 58 y.o. female.    Chief Complaint: Medication Refill (Albuterol ( Proventil/Ventolin hfa ) 90 mcg/actuation inhaler/Albuterol sulfate 90 mcg/actuation aebs ) and Follow-up (Pt is in for 6 month follow up)    Ms. Ho presents to clinic for 6 mos follow up.   She mentions recent episodes of "chest pressure" to the center of chest that lasts a "couple minutes" and goes away. Denies SOB, increased fatigue, back pain, or worsening reflux. Pain is not worse with activity or relieved via rest. Has not seen cardiology in several years. BP is controlled. Anxiety controlled. NO smoking.     Active Problem List with Overview Notes    Diagnosis Date Noted    Wheeze 04/30/2024    Chest pain 04/30/2024    Esophagitis 10/31/2023    Acute superficial gastritis without hemorrhage 10/31/2023    Vitamin D deficiency 10/30/2023    Environmental allergies 10/30/2023    Depression 10/30/2023    Fatty liver 12/13/2022    Gastroparesis 07/29/2022    Prediabetes 04/08/2022    Essential hypertension, benign 04/01/2022    Encounter for long-term (current) use of other medications 04/01/2022    Dysuria 04/01/2022    Chronic fatigue 04/01/2022    Moderate episode of recurrent major depressive disorder 04/01/2022    Panic attacks     Liver fibrosis 12/21/2021    Nontraumatic complete tear of right rotator cuff 11/10/2021    Constipation 11/01/2021    Abdominal pain 11/01/2021    S/P right rotator cuff repair 06/16/2021    Dysphagia 04/29/2021    Moderate persistent asthma without complication 04/19/2021    GERD without esophagitis 04/19/2021        Review of Systems   Constitutional:  Negative for chills, fatigue and fever.   HENT:  Positive for congestion and rhinorrhea. Negative for hearing loss, postnasal drip, sore throat, tinnitus and voice change.    Respiratory:  Negative for apnea, cough, choking, chest tightness and shortness of breath.    Cardiovascular:  Negative for chest " pain, palpitations and leg swelling.   Gastrointestinal:  Negative for abdominal pain, constipation, diarrhea and nausea.   Genitourinary:  Negative for difficulty urinating.   Neurological:  Negative for dizziness, syncope, weakness and headaches.   Psychiatric/Behavioral:  Negative for sleep disturbance.         A1C:  Recent Labs   Lab 01/04/23  1539 04/06/23  0842 10/30/23  1340   Hemoglobin A1C 6.2 6.1 6.1     CBC:  Recent Labs   Lab 12/13/22  0929 04/06/23  0842 10/30/23  1340   WBC 5.47 7.04 4.39 L   RBC 4.78 4.88 4.58   Hemoglobin 14.0 14.4 13.9   Hematocrit 43.2 44.9 41.5   Platelet Count 344 374 311   MCV 90.4 92.0 90.6   MCH 29.3 29.5 30.3   MCHC 32.4 32.1 33.5     CMP:  Recent Labs   Lab 12/13/22  0929 01/04/23  1539 04/06/23  0842 10/30/23  1340 11/06/23  1116   Glucose 102   < > 112 H 95  --    Calcium 8.8   < > 9.1 9.3  --    Albumin 3.4 L  --  3.6  --  3.6   Total Protein 7.1  --  7.3  --  7.4   Sodium 137   < > 140 139  --    Potassium 3.8   < > 4.3 3.9  --    CO2 31   < > 29 29  --    Chloride 105   < > 106 107  --    BUN 7   < > 7 10  --    Creatinine 0.82   < > 0.92 0.97  --    Alk Phos 94  --  100  --  102   ALT 23  --  20  --  28   AST 15  --  15  --  19   Bilirubin, Total 0.7  --  1.1  --  0.7    < > = values in this interval not displayed.     LIPIDS:  Recent Labs   Lab 01/04/23  1539 04/06/23  0842   TSH 2.170  --    HDL Cholesterol  --  56   Cholesterol  --  166   Triglycerides  --  54   LDL Calculated  --  99   Cholesterol/HDL Ratio (Risk Factor)  --  3.0   Non-HDL  --  110     TSH:  Recent Labs   Lab 04/01/22  1107 01/04/23  1539   TSH 1.000 2.170        Objective:      Vitals:    04/30/24 1025   BP:    Pulse: 84   Resp:       Physical Exam  Constitutional:       Appearance: Normal appearance. She is well-developed and normal weight.   HENT:      Head: Normocephalic.      Right Ear: External ear normal.      Left Ear: External ear normal.      Nose: Nose normal.      Mouth/Throat:       Lips: Pink.      Mouth: Mucous membranes are moist.      Pharynx: Oropharynx is clear.   Eyes:      General: Lids are normal.      Pupils: Pupils are equal, round, and reactive to light.   Cardiovascular:      Rate and Rhythm: Normal rate and regular rhythm.      Pulses: Normal pulses.      Heart sounds: Normal heart sounds.   Pulmonary:      Effort: Pulmonary effort is normal.      Breath sounds: Normal breath sounds.   Abdominal:      Palpations: Abdomen is soft.   Musculoskeletal:         General: Normal range of motion.      Cervical back: Normal range of motion.   Skin:     General: Skin is warm and dry.   Neurological:      Mental Status: She is alert and oriented to person, place, and time.   Psychiatric:         Attention and Perception: Attention normal.         Mood and Affect: Mood normal.         Speech: Speech normal.         Behavior: Behavior normal. Behavior is cooperative.       Assessment:       1. Essential hypertension, benign    2. Wheeze    3. Vitamin D deficiency    4. Chest pain, unspecified type    5. Moderate episode of recurrent major depressive disorder    6. Moderate persistent asthma without complication        Plan:   Routine labs today- call results.  2.   Follow up in 4 weeks or sooner if needed.  Problem List Items Addressed This Visit          Psychiatric    Moderate episode of recurrent major depressive disorder    Current Assessment & Plan     The current medical regimen is effective;  continue present plan and medications.  Does not need refill today.            Pulmonary    Moderate persistent asthma without complication    Current Assessment & Plan     The current medical regimen is effective;  continue present plan and medications.  Inhaler RX to pharm         Wheeze    Relevant Medications    albuterol (PROVENTIL/VENTOLIN HFA) 90 mcg/actuation inhaler       Cardiac/Vascular    Essential hypertension, benign - Primary (Chronic)    Current Assessment & Plan     Stop  "HCTZ  Monitor BP and keep recordings.   Follow up in 4 weeks or sooner if needed.   Continue Propranolol and Hydralazine as prescribed.         Relevant Orders    Comprehensive Metabolic Panel    CBC Auto Differential    Ambulatory referral/consult to Cardiology    Chest pain    Current Assessment & Plan     Refer to Card.  Seen via [facility] in the past- has not had work up in "3 years".   GO to ED with any CP, SOB or difficulty breathing- patient understands.            Relevant Orders    Ambulatory referral/consult to Cardiology       Endocrine    Vitamin D deficiency    Relevant Orders    Vitamin D       Health Maintenance:  Health Maintenance Topics with due status: Not Due       Topic Last Completion Date    Colorectal Cancer Screening 07/07/2016    Lipid Panel 04/06/2023    Hemoglobin A1c (Prediabetes) 10/30/2023    Mammogram 03/14/2024           Laura Hardy [facility]   4/30/24     "

## 2024-04-30 NOTE — ASSESSMENT & PLAN NOTE
The current medical regimen is effective;  continue present plan and medications.  Does not need refill today.

## 2024-04-30 NOTE — ASSESSMENT & PLAN NOTE
"Refer to Card.  Seen via [facility] in the past- has not had work up in "3 years".   GO to ED with any CP, SOB or difficulty breathing- patient understands.     "

## 2024-05-01 ENCOUNTER — OFFICE VISIT (OUTPATIENT)
Dept: CARDIOLOGY | Facility: CLINIC | Age: 59
End: 2024-05-01
Payer: MEDICARE

## 2024-05-01 VITALS
SYSTOLIC BLOOD PRESSURE: 120 MMHG | DIASTOLIC BLOOD PRESSURE: 70 MMHG | HEIGHT: 65 IN | BODY MASS INDEX: 34.32 KG/M2 | WEIGHT: 206 LBS

## 2024-05-01 DIAGNOSIS — F41.0 PANIC ATTACKS: ICD-10-CM

## 2024-05-01 DIAGNOSIS — I10 ESSENTIAL HYPERTENSION, BENIGN: ICD-10-CM

## 2024-05-01 DIAGNOSIS — I10 ESSENTIAL HYPERTENSION, BENIGN: Primary | Chronic | ICD-10-CM

## 2024-05-01 DIAGNOSIS — F32.9 REACTIVE DEPRESSION: ICD-10-CM

## 2024-05-01 DIAGNOSIS — K21.9 GASTROESOPHAGEAL REFLUX DISEASE, UNSPECIFIED WHETHER ESOPHAGITIS PRESENT: ICD-10-CM

## 2024-05-01 DIAGNOSIS — R07.9 CHEST PAIN, UNSPECIFIED TYPE: ICD-10-CM

## 2024-05-01 DIAGNOSIS — R00.2 PALPITATIONS: Primary | ICD-10-CM

## 2024-05-01 DIAGNOSIS — K58.9 IRRITABLE BOWEL SYNDROME, UNSPECIFIED TYPE: ICD-10-CM

## 2024-05-01 LAB
EST. AVERAGE GLUCOSE BLD GHB EST-MCNC: 137 MG/DL
HBA1C MFR BLD HPLC: 6.4 % (ref 4.5–6.6)

## 2024-05-01 PROCEDURE — 3078F DIAST BP <80 MM HG: CPT | Mod: CPTII,,, | Performed by: INTERNAL MEDICINE

## 2024-05-01 PROCEDURE — 3074F SYST BP LT 130 MM HG: CPT | Mod: CPTII,,, | Performed by: INTERNAL MEDICINE

## 2024-05-01 PROCEDURE — 93005 ELECTROCARDIOGRAM TRACING: CPT | Mod: PBBFAC | Performed by: INTERNAL MEDICINE

## 2024-05-01 PROCEDURE — 3008F BODY MASS INDEX DOCD: CPT | Mod: CPTII,,, | Performed by: INTERNAL MEDICINE

## 2024-05-01 PROCEDURE — 3044F HG A1C LEVEL LT 7.0%: CPT | Mod: CPTII,,, | Performed by: INTERNAL MEDICINE

## 2024-05-01 PROCEDURE — 99215 OFFICE O/P EST HI 40 MIN: CPT | Mod: PBBFAC | Performed by: INTERNAL MEDICINE

## 2024-05-01 PROCEDURE — 1159F MED LIST DOCD IN RCRD: CPT | Mod: CPTII,,, | Performed by: INTERNAL MEDICINE

## 2024-05-01 PROCEDURE — 99205 OFFICE O/P NEW HI 60 MIN: CPT | Mod: S$PBB,,, | Performed by: INTERNAL MEDICINE

## 2024-05-01 PROCEDURE — 93010 ELECTROCARDIOGRAM REPORT: CPT | Mod: S$PBB,,, | Performed by: INTERNAL MEDICINE

## 2024-05-01 NOTE — PROGRESS NOTES
"PCP: Ayleen Casas FNP    Referring Provider:     Subjective:   Sheri Ho is a 58 y.o. female with hx of anxiety, asthma  who presents for evaluation of chest pain, shortness of breath.  Pt reports symptoms started in 2019 with the death of her mother, became very anxious and depressed, was hospitalized briefly, has improved with medications, feels better on Wellbutrin, is getting counseling.   She notes dizziness with exertion, starting last week, came on spontaneously, lasted approximately five seconds after standing up quickly. She has mild intermittant palpitations, lasts seconds, comes and goes spontaneously.  She had a stress test "many" years ago, was reportedly normal.  PT also complains of chest pain, left sided, sharp, stabbing 8/10 at most severe, occurs several times a week, is occurring more frequently.  She notes chest pain followed by shortness of breath which improves with MDI        Fhx:  Family History   Problem Relation Name Age of Onset    Hypertension Mother      Diabetes Father      Hypertension Father      Heart disease Father      Heart attack Father      Hypertension Sister      Hypertension Maternal Grandmother      Breast cancer Cousin        Shx:   Past Surgical History:   Procedure Laterality Date    ARTHROSCOPIC REPAIR OF ROTATOR CUFF OF SHOULDER Right 11/30/2021    Procedure: REPAIR, ROTATOR CUFF, ARTHROSCOPIC;  Surgeon: Cain Treviño MD;  Location: [facility];  Service: Orthopedics;  Laterality: Right;    BLOCK, NERVE, SHOULDER Left 7/19/2023    Procedure: BLOCK, NERVE, SHOULDER;  Surgeon: Bernard Ellington MD;  Location: [facility];  Service: Pain Management;  Laterality: Left;  Left shoulder NB    BLOCK, NERVE, SHOULDER Right 4/10/2024    Procedure: BLOCK, NERVE, SHOULDER;  Surgeon: Bernard Ellington MD;  Location: [facility];  Service: Pain Management;  Laterality: Right;  Right shoulder NB    CHOLECYSTECTOMY      COLONOSCOPY      CYSTOSCOPY      " ESOPHAGOGASTRODUODENOSCOPY      HYSTERECTOMY      LIPOMA RESECTION N/A 09/02/2022    Procedure: EXCISION, BACK LIPOMA;  Surgeon: Zane Crowe DO;  Location: [facility] OR;  Service: General;  Laterality: N/A;    OOPHORECTOMY      ROTATOR CUFF REPAIR      SHOULDER ARTHROSCOPY      SHOULDER ARTHROSCOPY Right 11/30/2021    Procedure: ARTHROSCOPY, SHOULDER;  Surgeon: Cain Treviño MD;  Location: [facility] OR;  Service: Orthopedics;  Laterality: Right;        EKG - NSR with PVCs.     ECHO - No results found for this or any previous visit.       CATH - No results found for this or any previous visit.       Stress - No results found for this or any previous visit.       Lab Results   Component Value Date     04/30/2024    K 3.6 04/30/2024     04/30/2024    CO2 27 04/30/2024    BUN 6 (L) 04/30/2024    CREATININE 0.87 04/30/2024    CALCIUM 9.4 04/30/2024    ANIONGAP 10 04/30/2024    ESTGFRAFRICA 102 12/21/2021    EGFRNONAA 73 04/01/2022       Lab Results   Component Value Date    CHOL 166 04/06/2023    CHOL 175 04/01/2022    CHOL 167 08/18/2021     Lab Results   Component Value Date    HDL 56 04/06/2023    HDL 61 (H) 04/01/2022    HDL 58 08/18/2021     Lab Results   Component Value Date    LDLCALC 99 04/06/2023    LDLCALC 97 04/01/2022    LDLCALC 88 08/18/2021     Lab Results   Component Value Date    TRIG 54 04/06/2023    TRIG 87 04/01/2022    TRIG 104 08/18/2021     Lab Results   Component Value Date    CHOLHDL 3.0 04/06/2023    CHOLHDL 2.9 04/01/2022    CHOLHDL 2.9 08/18/2021       Lab Results   Component Value Date    WBC 4.52 04/30/2024    HGB 14.0 04/30/2024    HCT 43.4 04/30/2024    MCV 91.4 04/30/2024     04/30/2024           Current Outpatient Medications:     albuterol (PROVENTIL/VENTOLIN HFA) 90 mcg/actuation inhaler, Inhale 2 puffs into the lungs every 4 (four) hours as needed for Wheezing., Disp: 54 g, Rfl: 3    albuterol sulfate 90 mcg/actuation aebs, Inhale 180 mcg into the lungs  every 6 (six) hours as needed., Disp: , Rfl:     aspirin (ECOTRIN) 81 MG EC tablet, Take 81 mg by mouth once daily., Disp: , Rfl:     budesonide-formoterol 160-4.5 mcg (SYMBICORT) 160-4.5 mcg/actuation HFAA, Inhale 2 puffs into the lungs 2 (two) times a day., Disp: 30.6 g, Rfl: 3    cetirizine (ZYRTEC) 10 MG tablet, Take 1 tablet (10 mg total) by mouth once daily., Disp: 90 tablet, Rfl: 3    cyclobenzaprine (FLEXERIL) 10 MG tablet, Take 10 mg by mouth 3 (three) times daily., Disp: , Rfl:     EScitalopram oxalate (LEXAPRO) 20 MG tablet, Take 1 tablet (20 mg total) by mouth once daily., Disp: 90 tablet, Rfl: 3    hydrALAZINE (APRESOLINE) 10 MG tablet, Take 1 tablet (10 mg total) by mouth 2 (two) times daily., Disp: 180 tablet, Rfl: 3    hydroCHLOROthiazide (HYDRODIURIL) 12.5 MG Tab, Take 1 tablet (12.5 mg total) by mouth once daily., Disp: 90 tablet, Rfl: 3    HYDROcodone-acetaminophen (NORCO)  mg per tablet, Take 1 tablet by mouth every 8 (eight) hours as needed., Disp: , Rfl:     hydrOXYzine HCL (ATARAX) 10 MG Tab, 1-2 tabs Q6hrs prn for nerves, itching, insomnia, or nausea, Disp: 120 tablet, Rfl: 5    ibuprofen (ADVIL,MOTRIN) 800 MG tablet, Take 1 tablet (800 mg total) by mouth 2 (two) times a day., Disp: 180 tablet, Rfl: 1    LINZESS 145 mcg Cap capsule, TAKE 1 CAPSULE DAILY BEFORE BREAKFAST., Disp: 90 capsule, Rfl: 3    loteprednol (LOTEMAX) 0.5 % ophthalmic suspension, 1 drop. As directed, Disp: , Rfl:     MIRABEGRON ORAL, Take by mouth., Disp: , Rfl:     multivitamin (MULTIPLE VITAMINS ORAL), Take 1 tablet by mouth once daily. OTC: w/ Iron & Folic Acid, Disp: , Rfl:     mupirocin (BACTROBAN) 2 % ointment, Apply topically once daily., Disp: 30 g, Rfl: 12    pantoprazole (PROTONIX) 40 MG tablet, Take 1 tablet (40 mg total) by mouth once daily., Disp: 90 tablet, Rfl: 3    phentermine (ADIPEX-P) 37.5 MG capsule, Take 37.5 mg by mouth every morning., Disp: , Rfl:     progesterone (PROMETRIUM) 200 MG capsule,  Take 1 capsule by mouth every evening., Disp: , Rfl:     propranoloL (INDERAL) 40 MG tablet, TAKE 1 TABLET TWICE DAILY, Disp: 180 tablet, Rfl: 3    traZODone (DESYREL) 50 MG tablet, Take 50 mg by mouth nightly as needed., Disp: , Rfl:   No current facility-administered medications for this visit.    Facility-Administered Medications Ordered in Other Visits:     0.9%  NaCl infusion, , Intravenous, Continuous, Cain Treviño MD, Last Rate: 500 mL/hr at 09/02/22 1002, New Bag at 07/19/23 1458    Review of Systems   Constitutional: Negative for diaphoresis, malaise/fatigue, night sweats and weight gain.   HENT:  Positive for congestion. Negative for ear pain, hearing loss, nosebleeds and sore throat.         Sesonal allergies   Eyes:  Negative for blurred vision, double vision, pain, photophobia and visual disturbance.   Cardiovascular:  Positive for chest pain and palpitations. Negative for claudication, dyspnea on exertion, irregular heartbeat, leg swelling, near-syncope, orthopnea and syncope.   Respiratory:  Negative for cough, shortness of breath, sleep disturbances due to breathing, snoring and wheezing.    Endocrine: Negative for cold intolerance, heat intolerance, polydipsia, polyphagia and polyuria.   Hematologic/Lymphatic: Negative for bleeding problem. Does not bruise/bleed easily.   Skin:  Negative for dry skin, flushing, itching, rash and skin cancer.   Musculoskeletal:  Negative for arthritis, back pain, falls, joint pain, muscle cramps, muscle weakness and myalgias.   Gastrointestinal:  Positive for change in bowel habit, constipation, diarrhea and heartburn. Negative for abdominal pain, dysphagia, nausea and vomiting.        HX IBS   Genitourinary:  Negative for bladder incontinence, dysuria, flank pain, frequency and nocturia.        Recurrent UTIs   Neurological:  Positive for light-headedness. Negative for dizziness, focal weakness, headaches, loss of balance, numbness, paresthesias and seizures.  "  Psychiatric/Behavioral:  Positive for depression. Negative for memory loss and substance abuse. The patient is nervous/anxious.         Anxiety attacks.    Allergic/Immunologic: Negative for environmental allergies.          Objective:   /70 (BP Location: Left arm, Patient Position: Sitting)   Ht 5' 5" (1.651 m)   Wt 93.4 kg (206 lb)   BMI 34.28 kg/m²       Physical Exam  Vitals and nursing note reviewed.   Constitutional:       Appearance: Normal appearance. She is obese.   HENT:      Head: Normocephalic and atraumatic.      Right Ear: External ear normal.      Left Ear: External ear normal.   Eyes:      General: No scleral icterus.        Right eye: No discharge.         Left eye: No discharge.      Extraocular Movements: Extraocular movements intact.      Conjunctiva/sclera: Conjunctivae normal.      Pupils: Pupils are equal, round, and reactive to light.   Cardiovascular:      Rate and Rhythm: Normal rate and regular rhythm.      Pulses: Normal pulses.      Heart sounds: Normal heart sounds. No murmur heard.     No friction rub. No gallop.   Pulmonary:      Effort: Pulmonary effort is normal.      Breath sounds: Normal breath sounds. No wheezing, rhonchi or rales.   Chest:      Chest wall: No tenderness.   Abdominal:      General: Abdomen is flat. Bowel sounds are normal. There is no distension.      Palpations: Abdomen is soft.      Tenderness: There is no abdominal tenderness. There is no guarding or rebound.   Musculoskeletal:         General: No swelling or tenderness. Normal range of motion.      Cervical back: Normal range of motion and neck supple.      Right lower leg: No edema.   Skin:     General: Skin is warm and dry.      Findings: No erythema or rash.   Neurological:      General: No focal deficit present.      Mental Status: She is alert and oriented to person, place, and time.      Cranial Nerves: No cranial nerve deficit.      Motor: No weakness.      Gait: Gait normal.   Psychiatric:  "        Mood and Affect: Mood normal.         Behavior: Behavior normal.         Thought Content: Thought content normal.         Judgment: Judgment normal.     Assessment:     1. Panic attacks        2. Essential hypertension, benign  Ambulatory referral/consult to Cardiology    EKG 12-lead      3. Chest pain, unspecified type  Ambulatory referral/consult to Cardiology      4. Reactive depression              Plan:   Chest pain, atypical, will order treadmill cardiolyte stress test to eval for ischemic substrate  Palpitations, order Zio and echo to eval for structural heart disease.  Anxiety attacks, recovering from the loss of her mother, in therapy  Hypertension: controlled on home meds  Morbid obesity, discussed lifestyle changes.   6.   Asthma: controlled on MDIs  7.   IBS: controlled  8.   GERD: controlled    Follow up to review results of above.

## 2024-05-02 NOTE — PROGRESS NOTES
A1C is still in prediabetic range. Watch diet. Increase activity. Weight loss for better control and to prevent diabetes. Repeat labs in 1 year.

## 2024-05-09 LAB
OHS QRS DURATION: 74 MS
OHS QTC CALCULATION: 424 MS

## 2024-06-03 ENCOUNTER — HOSPITAL ENCOUNTER (OUTPATIENT)
Dept: CARDIOLOGY | Facility: HOSPITAL | Age: 59
Discharge: HOME | End: 2024-06-03
Attending: INTERNAL MEDICINE
Payer: MEDICARE

## 2024-06-03 ENCOUNTER — HOSPITAL ENCOUNTER (OUTPATIENT)
Dept: RADIOLOGY | Facility: HOSPITAL | Age: 59
Discharge: HOME | End: 2024-06-03
Attending: INTERNAL MEDICINE
Payer: MEDICARE

## 2024-06-03 VITALS — BODY MASS INDEX: 34.32 KG/M2 | HEIGHT: 65 IN | WEIGHT: 206 LBS

## 2024-06-03 DIAGNOSIS — R07.9 CHEST PAIN, UNSPECIFIED TYPE: ICD-10-CM

## 2024-06-03 PROCEDURE — 93017 CV STRESS TEST TRACING ONLY: CPT

## 2024-06-03 PROCEDURE — A9500 TC99M SESTAMIBI: HCPCS | Performed by: INTERNAL MEDICINE

## 2024-06-03 PROCEDURE — 63600175 PHARM REV CODE 636 W HCPCS: Performed by: INTERNAL MEDICINE

## 2024-06-03 PROCEDURE — 78452 HT MUSCLE IMAGE SPECT MULT: CPT | Mod: 26,,, | Performed by: STUDENT IN AN ORGANIZED HEALTH CARE EDUCATION/TRAINING PROGRAM

## 2024-06-03 PROCEDURE — 93018 CV STRESS TEST I&R ONLY: CPT | Mod: ,,, | Performed by: INTERNAL MEDICINE

## 2024-06-03 PROCEDURE — 93306 TTE W/DOPPLER COMPLETE: CPT

## 2024-06-03 PROCEDURE — 93306 TTE W/DOPPLER COMPLETE: CPT | Mod: 26,,, | Performed by: INTERNAL MEDICINE

## 2024-06-03 PROCEDURE — 78452 HT MUSCLE IMAGE SPECT MULT: CPT | Mod: TC

## 2024-06-03 PROCEDURE — 93016 CV STRESS TEST SUPVJ ONLY: CPT | Mod: ,,, | Performed by: NURSE PRACTITIONER

## 2024-06-03 RX ORDER — TETRAKIS(2-METHOXYISOBUTYLISOCYANIDE)COPPER(I) TETRAFLUOROBORATE 1 MG/ML
11.6 INJECTION, POWDER, LYOPHILIZED, FOR SOLUTION INTRAVENOUS
Status: COMPLETED | OUTPATIENT
Start: 2024-06-03 | End: 2024-06-03

## 2024-06-03 RX ORDER — REGADENOSON 0.08 MG/ML
0.4 INJECTION, SOLUTION INTRAVENOUS ONCE
Status: COMPLETED | OUTPATIENT
Start: 2024-06-03 | End: 2024-06-03

## 2024-06-03 RX ORDER — TETRAKIS(2-METHOXYISOBUTYLISOCYANIDE)COPPER(I) TETRAFLUOROBORATE 1 MG/ML
36 INJECTION, POWDER, LYOPHILIZED, FOR SOLUTION INTRAVENOUS
Status: COMPLETED | OUTPATIENT
Start: 2024-06-03 | End: 2024-06-03

## 2024-06-03 RX ADMIN — KIT FOR THE PREPARATION OF TECHNETIUM TC99M SESTAMIBI 11.6 MILLICURIE: 1 INJECTION, POWDER, LYOPHILIZED, FOR SOLUTION PARENTERAL at 08:06

## 2024-06-03 RX ADMIN — REGADENOSON 0.4 MG: 0.08 INJECTION, SOLUTION INTRAVENOUS at 10:06

## 2024-06-03 RX ADMIN — KIT FOR THE PREPARATION OF TECHNETIUM TC99M SESTAMIBI 36 MILLICURIE: 1 INJECTION, POWDER, LYOPHILIZED, FOR SOLUTION PARENTERAL at 10:06

## 2024-06-11 LAB
AORTIC ROOT ANNULUS: 2.39 CM
AORTIC VALVE CUSP SEPERATION: 1.85 CM
AV INDEX (PROSTH): 0.73
AV MEAN GRADIENT: 4 MMHG
AV PEAK GRADIENT: 7 MMHG
AV VALVE AREA BY VELOCITY RATIO: 1.84 CM²
AV VALVE AREA: 1.86 CM²
AV VELOCITY RATIO: 0.72
BSA FOR ECHO PROCEDURE: 2.07 M2
CV ECHO LV RWT: 0.51 CM
CV STRESS BASE HR: 60 BPM
DIASTOLIC BLOOD PRESSURE: 92 MMHG
DOP CALC AO PEAK VEL: 1.34 M/S
DOP CALC AO VTI: 28.4 CM
DOP CALC LVOT AREA: 2.5 CM2
DOP CALC LVOT DIAMETER: 1.8 CM
DOP CALC LVOT PEAK VEL: 0.97 M/S
DOP CALC LVOT STROKE VOLUME: 52.9 CM3
DOP CALCLVOT PEAK VEL VTI: 20.8 CM
E WAVE DECELERATION TIME: 206.13 MSEC
E/A RATIO: 1.26
E/E' RATIO: 7.68 M/S
ECHO LV POSTERIOR WALL: 1.05 CM (ref 0.6–1.1)
FRACTIONAL SHORTENING: 21 % (ref 28–44)
INTERVENTRICULAR SEPTUM: 0.92 CM (ref 0.6–1.1)
IVC DIAMETER: 1.41 CM
LEFT ATRIUM VOLUME INDEX MOD: 22.4 ML/M2
LEFT ATRIUM VOLUME MOD: 44.77 CM3
LEFT INTERNAL DIMENSION IN SYSTOLE: 3.25 CM (ref 2.1–4)
LEFT VENTRICLE DIASTOLIC VOLUME INDEX: 36.87 ML/M2
LEFT VENTRICLE DIASTOLIC VOLUME: 73.73 ML
LEFT VENTRICLE MASS INDEX: 64 G/M2
LEFT VENTRICLE SYSTOLIC VOLUME INDEX: 21.2 ML/M2
LEFT VENTRICLE SYSTOLIC VOLUME: 42.48 ML
LEFT VENTRICULAR INTERNAL DIMENSION IN DIASTOLE: 4.09 CM (ref 3.5–6)
LEFT VENTRICULAR MASS: 128.83 G
LV LATERAL E/E' RATIO: 6.64 M/S
LV SEPTAL E/E' RATIO: 9.13 M/S
LVOT MG: 1.91 MMHG
LVOT MV: 0.65 CM/S
MV PEAK A VEL: 0.58 M/S
MV PEAK E VEL: 0.73 M/S
OHS CV CPX 1 MINUTE RECOVERY HEART RATE: 80 BPM
OHS CV CPX 85 PERCENT MAX PREDICTED HEART RATE MALE: 138
OHS CV CPX MAX PREDICTED HEART RATE: 162
OHS CV CPX PATIENT IS FEMALE: 1
OHS CV CPX PATIENT IS MALE: 0
OHS CV CPX PEAK DIASTOLIC BLOOD PRESSURE: 95 MMHG
OHS CV CPX PEAK HEAR RATE: 88 BPM
OHS CV CPX PEAK RATE PRESSURE PRODUCT: NORMAL
OHS CV CPX PEAK SYSTOLIC BLOOD PRESSURE: 146 MMHG
OHS CV CPX PERCENT MAX PREDICTED HEART RATE ACHIEVED: 57
OHS CV CPX RATE PRESSURE PRODUCT PRESENTING: 8340
OHS CV RV/LV RATIO: 0.78 CM
PISA MRMAX VEL: 3.31 M/S
PISA TR MAX VEL: 2.49 M/S
PV PEAK GRADIENT: 3 MMHG
PV PEAK VELOCITY: 0.81 M/S
RA VOL SYS: 37.05 ML
RIGHT ATRIAL AREA: 14.4 CM2
RIGHT VENTRICLE DIASTOLIC LENGTH: 7.1 CM
RIGHT VENTRICLE DIASTOLIC MID DIMENSION: 2.1 CM
RIGHT VENTRICULAR END-DIASTOLIC DIMENSION: 3.2 CM
RIGHT VENTRICULAR LENGTH IN DIASTOLE (APICAL 4-CHAMBER VIEW): 7.14 CM
RV MID DIAMA: 2.08 CM
SYSTOLIC BLOOD PRESSURE: 139 MMHG
TDI LATERAL: 0.11 M/S
TDI SEPTAL: 0.08 M/S
TDI: 0.1 M/S
TR MAX PG: 25 MMHG
Z-SCORE OF LEFT VENTRICULAR DIMENSION IN END DIASTOLE: -3.54
Z-SCORE OF LEFT VENTRICULAR DIMENSION IN END SYSTOLE: -0.76

## 2024-07-01 ENCOUNTER — OFFICE VISIT (OUTPATIENT)
Dept: GASTROENTEROLOGY | Facility: CLINIC | Age: 59
End: 2024-07-01
Payer: MEDICARE

## 2024-07-01 ENCOUNTER — TELEPHONE (OUTPATIENT)
Dept: GASTROENTEROLOGY | Facility: CLINIC | Age: 59
End: 2024-07-01
Payer: MEDICARE

## 2024-07-01 VITALS — HEIGHT: 65 IN | BODY MASS INDEX: 34.43 KG/M2 | WEIGHT: 206.63 LBS

## 2024-07-01 DIAGNOSIS — K59.00 CONSTIPATION, UNSPECIFIED CONSTIPATION TYPE: ICD-10-CM

## 2024-07-01 DIAGNOSIS — K74.00 LIVER FIBROSIS: Primary | ICD-10-CM

## 2024-07-01 DIAGNOSIS — K76.0 FATTY LIVER: ICD-10-CM

## 2024-07-01 DIAGNOSIS — K21.9 GASTROESOPHAGEAL REFLUX DISEASE, UNSPECIFIED WHETHER ESOPHAGITIS PRESENT: ICD-10-CM

## 2024-07-01 PROCEDURE — 99999 PR PBB SHADOW E&M-EST. PATIENT-LVL IV: CPT | Mod: PBBFAC,,,

## 2024-07-01 PROCEDURE — 85610 PROTHROMBIN TIME: CPT

## 2024-07-01 PROCEDURE — 99214 OFFICE O/P EST MOD 30 MIN: CPT | Mod: PBBFAC

## 2024-07-01 RX ORDER — PANTOPRAZOLE SODIUM 40 MG/1
40 TABLET, DELAYED RELEASE ORAL 2 TIMES DAILY
Qty: 180 TABLET | Refills: 3 | Status: SHIPPED | OUTPATIENT
Start: 2024-07-01 | End: 2025-07-01

## 2024-07-01 NOTE — TELEPHONE ENCOUNTER
Results called to patient. Verbalized understanding.                ----- Message from Danna Moses NP sent at 7/1/2024  3:47 PM CDT -----  Labs are good

## 2024-07-01 NOTE — PROGRESS NOTES
"  CC:  Follow-up fatty liver    HPI 59 y.o. AA female presents today for six-month follow-up.  Patient state she has having an increase in nausea and heartburn.  Patient state she did vomit yesterday but denies any dysphagia.  Patient is taking Protonix 40 mg daily.  We did discuss her diet patient is eating peppermint daily and fried greasy foods daily patient does drink sodas as well.  Discussed cutting types since being out of diet we will send home instruction to what to stay away from.  We will increase her Protonix to 40 mg twice a day.  Patient denies any abdominal pain, no hematochezia or melena.  Patient continues take her Linzess and states that he is working and she is having good bowel movements.  Labs reviewed no elevated liver enzymes or no anemia.  Interval HPI:  Presents for follow-up after CT abdomen and pelvis.  CT abdomen and pelvis was performed on 11/20/2023, no acute pathology.  Portal/hepatic venous shunt again demonstrated along the falciform ligament. Prior cholecystectomy.  Prior hysterectomy.  EGD dilation on 10/31/2023, mild reflux, gastritis.  No H pylori.  Reports that she occasionally has abdominal discomfort but feels that it is her "nerves".  Patient has a history of anxiety and depression.  She has previously been followed by [facility].  States that she is due to follow-up.  She also has a history of fatty liver and liver fibrosis.  Metavir score F2 and F3 on 11/22/2021.  Hepatic function panel on 11/06 is normal.  Last colonoscopy was 07/07/2016, no colon polyps, recommendation to repeat in 10 years.  Is currently taking Linzess for constipation.  Reports that this does improve her constipation symptoms.   Medical records reviewed. Additional history supplemented by nursing.     Past Medical History:   Diagnosis Date    Arthritis     Esophageal dysphagia 4/29/2021    Gastroparesis 7/29/2022    GERD (gastroesophageal reflux disease)     Mitral insufficiency     Panic attacks     SOB " "(shortness of breath)          Review of Systems  General ROS: negative for chills, fever or weight loss  Cardiovascular ROS: no chest pain or dyspnea on exertion  Gastrointestinal ROS: no abdominal pain, change in bowel habits, or black/ bloody stools    Physical Examination  Ht 5' 5" (1.651 m)   Wt 93.7 kg (206 lb 9.6 oz)   BMI 34.38 kg/m²   General appearance: alert, cooperative, no distress  HENT: Normocephalic, atraumatic, neck symmetrical, no nasal discharge   Lungs: no labored breathing, symmetric chest wall expansion bilaterally  Heart: regular rate and rhythm without rub; no displacement of the PMI   Abdomen: soft, non-tender; bowel sounds normoactive; no organomegaly    Labs:  Lab Results   Component Value Date    WBC 4.52 04/30/2024    HGB 14.0 04/30/2024    HCT 43.4 04/30/2024    MCV 91.4 04/30/2024     04/30/2024       CMP  Sodium   Date Value Ref Range Status   04/30/2024 138 136 - 145 mmol/L Final     Potassium   Date Value Ref Range Status   04/30/2024 3.6 3.5 - 5.1 mmol/L Final     Chloride   Date Value Ref Range Status   04/30/2024 105 98 - 107 mmol/L Final     CO2   Date Value Ref Range Status   04/30/2024 27 21 - 32 mmol/L Final     Glucose   Date Value Ref Range Status   04/30/2024 161 (H) 74 - 106 mg/dL Final     BUN   Date Value Ref Range Status   04/30/2024 6 (L) 7 - 18 mg/dL Final     Creatinine   Date Value Ref Range Status   04/30/2024 0.87 0.55 - 1.02 mg/dL Final     Calcium   Date Value Ref Range Status   04/30/2024 9.4 8.5 - 10.1 mg/dL Final     Total Protein   Date Value Ref Range Status   04/30/2024 7.0 6.4 - 8.2 g/dL Final     Albumin   Date Value Ref Range Status   04/30/2024 3.5 3.5 - 5.0 g/dL Final     Bilirubin, Total   Date Value Ref Range Status   04/30/2024 0.7 >0.0 - 1.2 mg/dL Final     Alk Phos   Date Value Ref Range Status   04/30/2024 94 46 - 118 U/L Final     AST   Date Value Ref Range Status   04/30/2024 28 15 - 37 U/L Final     ALT   Date Value Ref Range Status "   04/30/2024 39 13 - 56 U/L Final     Anion Gap   Date Value Ref Range Status   04/30/2024 10 7 - 16 mmol/L Final     eGFR    Date Value Ref Range Status   12/21/2021 102 >=60 mL/min/1.73m² Final     eGFR   Date Value Ref Range Status   04/01/2022 73 >=60 mL/min/1.73m² Final       Imaging:  US ELASTOGRAPHY LIVER W/ IMAGING     CLINICAL HISTORY:  Fatty (change of) liver, not elsewhere classified     COMPARISON:  None     TECHNIQUE:  Ultrasound elastography was performed. Multiple reference points were used in the right lobe of the liver.     FINDINGS:  The median kPa is 7.14.  The average kPa is 7.16.  IQR/median 4.4 %.     Impression:     Metavir Score: F 0-1.     Point of Service: [facility]        Electronically signed by:Dayne Wolfe  Date:                                            01/17/2024  Time:                                           11:11           Exam Ended: 01/17/24 11:01 CST             Independently reviewed    Assessment: Sheri Ho 58 yo AAF here with:  Fatty disease  GERD  History of dysphagia    Plan:  Increase Protonix 40 mg 2 twice daily  Ultrasound of the liver at six-month appointment  Labs PT, CBC, CMP at six-month appointment  Follow-up six-month    30 minutes of total time spent on the encounter, which includes face to face time and non-face to face time preparing to see the patient (eg, review of tests), obtaining and/or reviewing separately obtained history, documenting clinical information in the electronic or other health record, Independently interpreting results (not separately reported) and communicating results to the patient/family/caregiver, or care coordination (not separately reported).         Carla Adams, FNP [facility]

## 2024-07-01 NOTE — PATIENT INSTRUCTIONS
Allegra or Xyzal for allergies     -Avoid spicy, greasy foods  -Avoid caffeine, citric acid, chocolate, peppermint, and carbonated drinks  -Do not lay down within 3 hours of eating  -Exercise 150 minutes per week  -Increase fluid to 64 ounces daily  -Avoid antiinflammatory medications such as motrin, advil, aleve, ibuprofen, and BC powder     -Exercise 150 minutes per week  -Weight loss of 7-10%. Weight loss should be gradual  -Diet low in saturated fats and carbohydrates  -Good glucose and cholesterol control

## 2024-07-03 ENCOUNTER — OFFICE VISIT (OUTPATIENT)
Dept: CARDIOLOGY | Facility: CLINIC | Age: 59
End: 2024-07-03
Payer: MEDICARE

## 2024-07-03 VITALS
WEIGHT: 206 LBS | BODY MASS INDEX: 34.32 KG/M2 | HEIGHT: 65 IN | SYSTOLIC BLOOD PRESSURE: 130 MMHG | HEART RATE: 72 BPM | DIASTOLIC BLOOD PRESSURE: 90 MMHG

## 2024-07-03 DIAGNOSIS — I25.119 ATHEROSCLEROSIS OF NATIVE CORONARY ARTERY OF NATIVE HEART WITH ANGINA PECTORIS: ICD-10-CM

## 2024-07-03 DIAGNOSIS — R07.2 PRECORDIAL PAIN: ICD-10-CM

## 2024-07-03 DIAGNOSIS — R00.2 PALPITATIONS: Primary | ICD-10-CM

## 2024-07-03 PROCEDURE — 3008F BODY MASS INDEX DOCD: CPT | Mod: CPTII,,, | Performed by: INTERNAL MEDICINE

## 2024-07-03 PROCEDURE — 99214 OFFICE O/P EST MOD 30 MIN: CPT | Mod: S$PBB,,, | Performed by: INTERNAL MEDICINE

## 2024-07-03 PROCEDURE — 3044F HG A1C LEVEL LT 7.0%: CPT | Mod: CPTII,,, | Performed by: INTERNAL MEDICINE

## 2024-07-03 PROCEDURE — 99999 PR PBB SHADOW E&M-EST. PATIENT-LVL IV: CPT | Mod: PBBFAC,,, | Performed by: INTERNAL MEDICINE

## 2024-07-03 PROCEDURE — 99214 OFFICE O/P EST MOD 30 MIN: CPT | Mod: PBBFAC | Performed by: INTERNAL MEDICINE

## 2024-07-03 PROCEDURE — 3080F DIAST BP >= 90 MM HG: CPT | Mod: CPTII,,, | Performed by: INTERNAL MEDICINE

## 2024-07-03 PROCEDURE — 3075F SYST BP GE 130 - 139MM HG: CPT | Mod: CPTII,,, | Performed by: INTERNAL MEDICINE

## 2024-07-03 RX ORDER — DILTIAZEM HYDROCHLORIDE 240 MG/1
240 CAPSULE, COATED, EXTENDED RELEASE ORAL DAILY
Qty: 30 CAPSULE | Refills: 5 | Status: SHIPPED | OUTPATIENT
Start: 2024-07-03 | End: 2024-12-30

## 2024-07-12 ENCOUNTER — OFFICE VISIT (OUTPATIENT)
Dept: FAMILY MEDICINE | Facility: CLINIC | Age: 59
End: 2024-07-12
Payer: MEDICARE

## 2024-07-12 VITALS
SYSTOLIC BLOOD PRESSURE: 132 MMHG | RESPIRATION RATE: 20 BRPM | HEART RATE: 79 BPM | TEMPERATURE: 98 F | HEIGHT: 65 IN | BODY MASS INDEX: 34.16 KG/M2 | WEIGHT: 205 LBS | DIASTOLIC BLOOD PRESSURE: 69 MMHG | OXYGEN SATURATION: 100 %

## 2024-07-12 DIAGNOSIS — R21 SKIN RASH: ICD-10-CM

## 2024-07-12 DIAGNOSIS — U07.1 COVID: Primary | ICD-10-CM

## 2024-07-12 DIAGNOSIS — U07.1 COVID-19 VIRUS DETECTED: ICD-10-CM

## 2024-07-12 DIAGNOSIS — R68.2 DRY MOUTH: ICD-10-CM

## 2024-07-12 DIAGNOSIS — N39.0 URINARY TRACT INFECTION WITHOUT HEMATURIA, SITE UNSPECIFIED: ICD-10-CM

## 2024-07-12 DIAGNOSIS — Z20.828 CONTACT WITH OR EXPOSURE TO VIRAL DISEASE: ICD-10-CM

## 2024-07-12 DIAGNOSIS — R30.0 DYSURIA: ICD-10-CM

## 2024-07-12 PROBLEM — E11.9 TYPE 2 DIABETES MELLITUS WITHOUT COMPLICATIONS: Status: ACTIVE | Noted: 2024-07-12

## 2024-07-12 PROBLEM — I25.119 ATHEROSCLEROTIC HEART DISEASE OF NATIVE CORONARY ARTERY WITH UNSPECIFIED ANGINA PECTORIS: Status: ACTIVE | Noted: 2024-07-12

## 2024-07-12 LAB
BILIRUB SERPL-MCNC: NEGATIVE MG/DL
BLOOD URINE, POC: NEGATIVE
CLARITY, POC UA: CLEAR
COLOR, POC UA: YELLOW
CTP QC/QA: YES
CTP QC/QA: YES
GLUCOSE UR QL STRIP: NEGATIVE
KETONES UR QL STRIP: NEGATIVE
LEUKOCYTE ESTERASE URINE, POC: ABNORMAL
NITRITE, POC UA: NEGATIVE
PH, POC UA: 7
POC MOLECULAR INFLUENZA A AGN: NEGATIVE
POC MOLECULAR INFLUENZA B AGN: NEGATIVE
PROTEIN, POC: NEGATIVE
SARS-COV-2 RDRP RESP QL NAA+PROBE: POSITIVE
SPECIFIC GRAVITY, POC UA: 1.02
UROBILINOGEN, POC UA: >=8

## 2024-07-12 RX ORDER — NIRMATRELVIR AND RITONAVIR 300-100 MG
KIT ORAL
Qty: 30 TABLET | Refills: 0 | Status: SHIPPED | OUTPATIENT
Start: 2024-07-12 | End: 2024-07-17

## 2024-07-12 RX ORDER — DEXAMETHASONE SODIUM PHOSPHATE 4 MG/ML
4 INJECTION, SOLUTION INTRA-ARTICULAR; INTRALESIONAL; INTRAMUSCULAR; INTRAVENOUS; SOFT TISSUE
Status: COMPLETED | OUTPATIENT
Start: 2024-07-12 | End: 2024-07-12

## 2024-07-12 RX ORDER — DOXYCYCLINE 100 MG/1
100 CAPSULE ORAL 2 TIMES DAILY
Qty: 20 CAPSULE | Refills: 0 | Status: SHIPPED | OUTPATIENT
Start: 2024-07-12 | End: 2024-07-22

## 2024-07-12 RX ORDER — NIRMATRELVIR AND RITONAVIR 300-100 MG
KIT ORAL
Qty: 30 TABLET | Refills: 0 | Status: SHIPPED | OUTPATIENT
Start: 2024-07-12 | End: 2024-07-12

## 2024-07-12 RX ADMIN — DEXAMETHASONE SODIUM PHOSPHATE 4 MG: 4 INJECTION, SOLUTION INTRA-ARTICULAR; INTRALESIONAL; INTRAMUSCULAR; INTRAVENOUS; SOFT TISSUE at 02:07

## 2024-07-12 NOTE — PROGRESS NOTES
"Subjective:       Patient ID: Sheri Ho is a 59 y.o. female.    Chief Complaint: Rash (Patient states that she started a new medication last week and now has a rash), COVID-19 Concerns (Body aches, cough, congestion, vomiting, sore throat), and Dysuria (Dysuria with urinary urgency)      Presents to clinic with complaint of cough, congestion, sore throat, body aches, fever, and skin rash.  Skin rashes mildly itchy.  No shortness a breath.  No nausea or vomiting.  Just recently started Cardizem prescribed by her cardiologist. Also complained of dysuria and urinary frequency.  Denies flank pain.      Review of Systems   Constitutional:  Positive for chills, fever and malaise/fatigue.   HENT:  Positive for congestion, sinus pain and sore throat.    Respiratory:  Positive for cough. Negative for hemoptysis, sputum production, shortness of breath and wheezing.    Cardiovascular: Negative.    Gastrointestinal: Negative.    Musculoskeletal:  Positive for myalgias.   Skin:  Positive for rash.   Neurological:  Positive for headaches.          Reviewed family, medical, surgical, and social history.    Objective:      /69   Pulse 79   Temp 98 °F (36.7 °C)   Resp 20   Ht 5' 5" (1.651 m)   Wt 93 kg (205 lb)   SpO2 100%   BMI 34.11 kg/m²   Physical Exam  Vitals and nursing note reviewed.   Constitutional:       General: She is not in acute distress.     Appearance: Normal appearance. She is not ill-appearing, toxic-appearing or diaphoretic.   HENT:      Head: Normocephalic.      Right Ear: Hearing, tympanic membrane, ear canal and external ear normal.      Left Ear: Hearing, tympanic membrane, ear canal and external ear normal.      Nose: Mucosal edema, congestion and rhinorrhea present. Rhinorrhea is clear.      Right Turbinates: Enlarged and swollen.      Left Turbinates: Enlarged and swollen.      Right Sinus: No maxillary sinus tenderness or frontal sinus tenderness.      Left Sinus: No maxillary sinus " tenderness or frontal sinus tenderness.      Mouth/Throat:      Lips: Pink.      Mouth: Mucous membranes are moist.      Pharynx: Uvula midline. Posterior oropharyngeal erythema present. No pharyngeal swelling, oropharyngeal exudate or uvula swelling.      Tonsils: No tonsillar exudate or tonsillar abscesses.   Cardiovascular:      Rate and Rhythm: Normal rate and regular rhythm.      Heart sounds: Normal heart sounds.   Pulmonary:      Effort: Pulmonary effort is normal.      Breath sounds: Normal breath sounds.   Abdominal:      General: Abdomen is flat. Bowel sounds are normal. There is no distension.      Palpations: Abdomen is soft. There is no mass.      Tenderness: There is no abdominal tenderness. There is no right CVA tenderness, left CVA tenderness, guarding or rebound.      Hernia: No hernia is present.   Musculoskeletal:      Cervical back: Normal range of motion and neck supple. No rigidity or tenderness.   Lymphadenopathy:      Cervical: No cervical adenopathy.   Skin:     General: Skin is warm and dry.      Findings: Rash present.      Comments:   Red, raised, maculopapular rash to back trunk, and arms.  No urticarial lesions   Neurological:      Mental Status: She is alert.   Psychiatric:         Mood and Affect: Mood normal.         Behavior: Behavior normal.         Thought Content: Thought content normal.         Judgment: Judgment normal.            Office Visit on 07/12/2024   Component Date Value Ref Range Status    Color, UA 07/12/2024 Yellow   Final    Clarity, UA 07/12/2024 Clear   Final    Spec Grav UA 07/12/2024 1.020   Final    pH, UA 07/12/2024 7.0   Final    WBC, UA 07/12/2024 Small   Final    Nitrite, UA 07/12/2024 Negative   Final    Protein, POC 07/12/2024 Negative   Final    Glucose, UA 07/12/2024 Negative   Final    Ketones, UA 07/12/2024 Negative   Final    Bilirubin, POC 07/12/2024 Negative   Final    Urobilinogen, UA 07/12/2024 >=8.0   Final    Blood, UA 07/12/2024 Negative    Final    POC Rapid COVID 07/12/2024 Positive (A)  Negative Final     Acceptable 07/12/2024 Yes   Final    POC Molecular Influenza A Ag 07/12/2024 Negative  Negative Final    POC Molecular Influenza B Ag 07/12/2024 Negative  Negative Final     Acceptable 07/12/2024 Yes   Final      Assessment:       1. COVID    2. Contact with or exposure to viral disease    3. Dysuria    4. Dry mouth    5. Skin rash    6. Urinary tract infection without hematuria, site unspecified        Plan:       COVID  -     Discontinue: nirmatrelvir-ritonavir (PAXLOVID) 300 mg (150 mg x 2)-100 mg copackaged tablets (EUA); Take 3 tablets by mouth 2 (two) times daily. Each dose contains 2 nirmatrelvir (pink tablets) and 1 ritonavir (white tablet). Take all 3 tablets together  Dispense: 30 tablet; Refill: 0  -     nirmatrelvir-ritonavir (PAXLOVID) 300 mg (150 mg x 2)-100 mg copackaged tablets (EUA); Take 3 tablets by mouth 2 (two) times daily. Each dose contains 2 nirmatrelvir (pink tablets) and 1 ritonavir (white tablet). Take all 3 tablets together  Dispense: 30 tablet; Refill: 0    Contact with or exposure to viral disease  -     POCT COVID-19 Rapid Screening  -     POCT Influenza A/B Molecular    Dysuria  -     POCT URINALYSIS W/O SCOPE    Dry mouth  -     POCT glucose    Skin rash  -     dexAMETHasone injection 4 mg    Urinary tract infection without hematuria, site unspecified  -     doxycycline (VIBRAMYCIN) 100 MG Cap; Take 1 capsule (100 mg total) by mouth 2 (two) times daily. for 10 days  Dispense: 20 capsule; Refill: 0    Notify Dr. Jimenez that she developed a rash after starting new medication.  Hold Cardizem for now  Quarantine for 5 days   Drink plenty of fluids   Over-the-counter medicines for symptoms   Antibiotics and for UTI   Steroid shot given in clinic for rash   Discussed Paxlovid risks and benefits and desires to take it.  Normal GFR  Go the emergency room with difficulty breathing   Return to  clinic as needed          Risks, benefits, and side effects were discussed with the patient. All questions were answered to the fullest satisfaction of the patient, and pt verbalized understanding and agreement to treatment plan. Pt was to call with any new or worsening symptoms, or present to the ER.

## 2024-07-12 NOTE — PATIENT INSTRUCTIONS
Notify Dr. Jimenez that she developed a rash after starting new medication   Quarantine for 5 days   Drink plenty of fluids   Over-the-counter medicines for symptoms   Antibiotics and for UTI   Steroid shot given in clinic for rash   Discussed Paxlovid risks and benefits and desires to take it.  Normal GFR  Go the emergency room with difficulty breathing   Return to clinic as needed

## 2024-07-15 NOTE — PROGRESS NOTES
PCP: Ayleen Casas FNP    Referring Provider:     Subjective:   Sheri Ho is a 59 y.o. female with hx of anxiety, asthma  who presents for evaluation of chest pain, shortness of breath.    She reports chest pain and shortness of breath have improved, continues to experience occasional palpitations, come and go spontaneously, are lasting five to ten seconds.  She notes episodes of palpitations are provoking anxiety.         Fhx:  Family History   Problem Relation Name Age of Onset    Hypertension Mother      Diabetes Father      Hypertension Father      Heart disease Father      Heart attack Father      Hypertension Sister      Hypertension Maternal Grandmother      Breast cancer Cousin        Shx:   Past Surgical History:   Procedure Laterality Date    ARTHROSCOPIC REPAIR OF ROTATOR CUFF OF SHOULDER Right 11/30/2021    Procedure: REPAIR, ROTATOR CUFF, ARTHROSCOPIC;  Surgeon: Cain Treviño MD;  Location: [facility] OR;  Service: Orthopedics;  Laterality: Right;    BLOCK, NERVE, SHOULDER Left 7/19/2023    Procedure: BLOCK, NERVE, SHOULDER;  Surgeon: Bernard Ellington MD;  [facility];  Service: Pain Management;  Laterality: Left;  Left shoulder NB    BLOCK, NERVE, SHOULDER Right 4/10/2024    Procedure: BLOCK, NERVE, SHOULDER;  Surgeon: Bernard Ellington MD;  [facility];  Service: Pain Management;  Laterality: Right;  Right shoulder NB    CHOLECYSTECTOMY      COLONOSCOPY      CYSTOSCOPY      ESOPHAGOGASTRODUODENOSCOPY      HYSTERECTOMY      LIPOMA RESECTION N/A 09/02/2022    Procedure: EXCISION, BACK LIPOMA;  Surgeon: Zane Crowe DO;  Location: [facility] OR;  Service: General;  Laterality: N/A;    OOPHORECTOMY      ROTATOR CUFF REPAIR      SHOULDER ARTHROSCOPY      SHOULDER ARTHROSCOPY Right 11/30/2021    Procedure: ARTHROSCOPY, SHOULDER;  Surgeon: Cain Treviño MD;  Location: [facility] OR;  Service: Orthopedics;  Laterality: Right;        EKG - NSR with  PVCs.     ECHO - No results found for this or any previous visit.       CATH - No results found for this or any previous visit.       Stress - No results found for this or any previous visit.       Lab Results   Component Value Date     07/01/2024    K 3.8 07/01/2024     07/01/2024    CO2 28 07/01/2024    BUN 5 (L) 07/01/2024    CREATININE 0.88 07/01/2024    CALCIUM 8.8 07/01/2024    ANIONGAP 13 07/01/2024    ESTGFRAFRICA 102 12/21/2021    EGFRNONAA 73 04/01/2022       Lab Results   Component Value Date    CHOL 166 04/06/2023    CHOL 175 04/01/2022    CHOL 167 08/18/2021     Lab Results   Component Value Date    HDL 56 04/06/2023    HDL 61 (H) 04/01/2022    HDL 58 08/18/2021     Lab Results   Component Value Date    LDLCALC 99 04/06/2023    LDLCALC 97 04/01/2022    LDLCALC 88 08/18/2021     Lab Results   Component Value Date    TRIG 54 04/06/2023    TRIG 87 04/01/2022    TRIG 104 08/18/2021     Lab Results   Component Value Date    CHOLHDL 3.0 04/06/2023    CHOLHDL 2.9 04/01/2022    CHOLHDL 2.9 08/18/2021       Lab Results   Component Value Date    WBC 6.32 07/01/2024    HGB 13.6 07/01/2024    HCT 41.3 07/01/2024    MCV 91.2 07/01/2024     07/01/2024           Current Outpatient Medications:     albuterol (PROVENTIL/VENTOLIN HFA) 90 mcg/actuation inhaler, Inhale 2 puffs into the lungs every 4 (four) hours as needed for Wheezing., Disp: 54 g, Rfl: 3    albuterol sulfate 90 mcg/actuation aebs, Inhale 180 mcg into the lungs every 6 (six) hours as needed., Disp: , Rfl:     aspirin (ECOTRIN) 81 MG EC tablet, Take 81 mg by mouth once daily., Disp: , Rfl:     budesonide-formoterol 160-4.5 mcg (SYMBICORT) 160-4.5 mcg/actuation HFAA, Inhale 2 puffs into the lungs 2 (two) times a day., Disp: 30.6 g, Rfl: 3    cetirizine (ZYRTEC) 10 MG tablet, Take 1 tablet (10 mg total) by mouth once daily., Disp: 90 tablet, Rfl: 3    cyclobenzaprine (FLEXERIL) 10 MG tablet, Take 10 mg by mouth 3 (three) times daily., Disp:  , Rfl:     EScitalopram oxalate (LEXAPRO) 20 MG tablet, Take 1 tablet (20 mg total) by mouth once daily., Disp: 90 tablet, Rfl: 3    hydrALAZINE (APRESOLINE) 10 MG tablet, Take 1 tablet (10 mg total) by mouth 2 (two) times daily., Disp: 180 tablet, Rfl: 3    HYDROcodone-acetaminophen (NORCO)  mg per tablet, Take 1 tablet by mouth every 8 (eight) hours as needed., Disp: , Rfl:     hydrOXYzine HCL (ATARAX) 10 MG Tab, 1-2 tabs Q6hrs prn for nerves, itching, insomnia, or nausea, Disp: 120 tablet, Rfl: 5    ibuprofen (ADVIL,MOTRIN) 800 MG tablet, Take 1 tablet (800 mg total) by mouth 2 (two) times a day., Disp: 180 tablet, Rfl: 1    LINZESS 145 mcg Cap capsule, TAKE 1 CAPSULE DAILY BEFORE BREAKFAST., Disp: 90 capsule, Rfl: 3    multivitamin (MULTIPLE VITAMINS ORAL), Take 1 tablet by mouth once daily. OTC: w/ Iron & Folic Acid, Disp: , Rfl:     pantoprazole (PROTONIX) 40 MG tablet, Take 1 tablet (40 mg total) by mouth 2 (two) times daily., Disp: 180 tablet, Rfl: 3    diltiaZEM (CARDIZEM CD) 240 MG 24 hr capsule, Take 1 capsule (240 mg total) by mouth once daily., Disp: 30 capsule, Rfl: 5    MIRABEGRON ORAL, Take by mouth., Disp: , Rfl:     progesterone (PROMETRIUM) 200 MG capsule, Take 1 capsule by mouth every evening., Disp: , Rfl:   No current facility-administered medications for this visit.    Facility-Administered Medications Ordered in Other Visits:     0.9%  NaCl infusion, , Intravenous, Continuous, Cain Treviño MD, Last Rate: 500 mL/hr at 09/02/22 1002, New Bag at 07/19/23 1458    Review of Systems   Constitutional: Negative for diaphoresis, malaise/fatigue, night sweats and weight gain.   HENT:  Positive for congestion. Negative for ear pain, hearing loss, nosebleeds and sore throat.         Sesonal allergies   Eyes:  Negative for blurred vision, double vision, pain, photophobia and visual disturbance.   Cardiovascular:  Positive for chest pain and palpitations. Negative for claudication, dyspnea on  "exertion, irregular heartbeat, leg swelling, near-syncope, orthopnea and syncope.   Respiratory:  Negative for cough, shortness of breath, sleep disturbances due to breathing, snoring and wheezing.    Endocrine: Negative for cold intolerance, heat intolerance, polydipsia, polyphagia and polyuria.   Hematologic/Lymphatic: Negative for bleeding problem. Does not bruise/bleed easily.   Skin:  Negative for dry skin, flushing, itching, rash and skin cancer.   Musculoskeletal:  Negative for arthritis, back pain, falls, joint pain, muscle cramps, muscle weakness and myalgias.   Gastrointestinal:  Positive for change in bowel habit, constipation, diarrhea and heartburn. Negative for abdominal pain, dysphagia, nausea and vomiting.        HX IBS   Genitourinary:  Negative for bladder incontinence, dysuria, flank pain, frequency and nocturia.        Recurrent UTIs   Neurological:  Positive for light-headedness. Negative for dizziness, focal weakness, headaches, loss of balance, numbness, paresthesias and seizures.   Psychiatric/Behavioral:  Positive for depression. Negative for memory loss and substance abuse. The patient is nervous/anxious.         Anxiety attacks.    Allergic/Immunologic: Negative for environmental allergies.          Objective:   BP (!) 130/90 (BP Location: Left arm, Patient Position: Sitting)   Pulse 72   Ht 5' 5" (1.651 m)   Wt 93.4 kg (206 lb)   BMI 34.28 kg/m²       Physical Exam  Vitals and nursing note reviewed.   Constitutional:       Appearance: Normal appearance. She is obese.   HENT:      Head: Normocephalic and atraumatic.      Right Ear: External ear normal.      Left Ear: External ear normal.   Eyes:      General: No scleral icterus.        Right eye: No discharge.         Left eye: No discharge.      Extraocular Movements: Extraocular movements intact.      Conjunctiva/sclera: Conjunctivae normal.      Pupils: Pupils are equal, round, and reactive to light.   Cardiovascular:      Rate and " Rhythm: Normal rate and regular rhythm.      Pulses: Normal pulses.      Heart sounds: Normal heart sounds. No murmur heard.     No friction rub. No gallop.   Pulmonary:      Effort: Pulmonary effort is normal.      Breath sounds: Normal breath sounds. No wheezing, rhonchi or rales.   Chest:      Chest wall: No tenderness.   Abdominal:      General: Abdomen is flat. Bowel sounds are normal. There is no distension.      Palpations: Abdomen is soft.      Tenderness: There is no abdominal tenderness. There is no guarding or rebound.   Musculoskeletal:         General: No swelling or tenderness. Normal range of motion.      Cervical back: Normal range of motion and neck supple.      Right lower leg: No edema.   Skin:     General: Skin is warm and dry.      Findings: No erythema or rash.   Neurological:      General: No focal deficit present.      Mental Status: She is alert and oriented to person, place, and time.      Cranial Nerves: No cranial nerve deficit.      Motor: No weakness.      Gait: Gait normal.   Psychiatric:         Mood and Affect: Mood normal.         Behavior: Behavior normal.         Thought Content: Thought content normal.         Judgment: Judgment normal.     Results for orders placed during the hospital encounter of 06/03/24    Echo    Interpretation Summary    Left Ventricle: The left ventricle is normal in size. Normal wall thickness. There is normal systolic function with a visually estimated ejection fraction of 55 - 70%. There is normal diastolic function.    Right Ventricle: Normal right ventricular cavity size. Systolic function is normal.    Aortic Valve: The aortic valve is a trileaflet valve.    Mitral Valve: There is mild regurgitation.    Tricuspid Valve: There is mild regurgitation.     Results for orders placed during the hospital encounter of 06/03/24    Nuclear Stress Test    Interpretation Summary    The ECG portion of the study is negative for ischemia.  Details    Reading  Physician Reading Date Result Priority   Tereza Patel MD  [phone] 6/3/2024 Routine     Narrative & Impression  EXAMINATION:  NM MYOCARDIAL PERFUSION SPECT MULTI STUDY     CLINICAL HISTORY:  chest pain;  Chest pain, unspecified     TECHNIQUE:  SPECT images in short, vertical and horizontal long axis were acquired 30 minutes after the injection of 11.6 mCi of Tc-99m sestamibi at rest and 36 mCi during a cardiac stress. The clinical stress and ECG portion of the study is to be read separately.     COMPARISON:  None.     FINDINGS:  The quality of the study is good VS is compromised by patient motion/GI activity adjacent to the inferior wall.     Small size, mild intensity fixed apical anterior defect with normal wall motion and thickening, likely artifact.     The gated post-stress images reveal normal wall motion and normal systolic wall thickening with an estimated LVEF of 68 %. The LV cavity (is not) dilated with an end-diastolic volume of (67 ml- normal less than 140) ml and an end-systolic volume of (21 ml- normal less than 70) ml.     Impression:     1.  Scintigraphically negative for ischemia or infarct.  2. Small size, mild intensity fixed apical anterior defect with normal wall motion and thickening, likely artifact.  3. the global left ventricular systolic function is normal with an LV ejection fraction of 68 % and no evidence of LV dilatation. Wall motion is normal.       The patient reported no chest pain during the stress test.      Zio shows predominant NSR, symptomatic PACs, PVCs, short runs SVT  Assessment:     1. Palpitations        2. Precordial pain        3. Atherosclerosis of native coronary artery of native heart with angina pectoris              Plan:   Chest pain, atypical, has improved, treadmill cardiolyte stress test negative for ischemic substrate, unlikely cardiac cause of chest pain,   Palpitations, symptomatic PACs, PVCs,  SVT, will add diltiazem  mg q d, monitor symptoms    Hypertension: controlled, monitor with addition of diltiazem. Morbid obesity, discussed lifestyle changes.   6.   Asthma: controlled on MDIs  7.   IBS: controlled  8.   GERD: controlled    Follow up in four to six weeks.

## 2024-07-17 ENCOUNTER — NURSE TRIAGE (OUTPATIENT)
Dept: ADMINISTRATIVE | Facility: CLINIC | Age: 59
End: 2024-07-17

## 2024-07-17 NOTE — TELEPHONE ENCOUNTER
Sheri reports diagnosed with Covid on 7/12/24; red widespread rash present at UC visit but has worsened since seen by UC provider. Pt asking if this is related to Covid. Reports minimal relief with applying Benadryl topically & Aloe Vera. +itching Advised pt per triage protocol to see a physician in office today. V/u. VV appt scheduled for 1640 today.     Reason for Disposition   Hives or itching    Additional Information   Negative: Difficulty breathing or wheezing   Negative: Hoarseness or cough that started soon after 1st dose of drug   Negative: Swollen tongue that started soon after first dose of drug   Negative: Fever and purple or blood-colored spots or dots   Negative: Too weak or sick to stand   Negative: Sounds like a life-threatening emergency to the triager   Negative: Rash is only on 1 part of the body (localized)   Negative: Swollen tongue   Negative: Widespread hives and onset < 2 hours of exposure to 1st dose of drug   Negative: Patient sounds very sick or weak to the triager   Negative: Fever   Negative: Face or lip swelling   Negative: Purple or blood-colored spots or dots (no fever and sounds well to triager)   Negative: Joint pain or swelling   Negative: Bloody crusts on lips or in mouth   Negative: Large or small blisters on skin (i.e., fluid filled bubbles or sacs)   Negative: Pregnant   Negative: Rash beginning within 4 hours of a new prescription medication    Protocols used: Rash - Widespread On Drugs-A-OH

## 2024-08-06 ENCOUNTER — TELEPHONE (OUTPATIENT)
Dept: GASTROENTEROLOGY | Facility: CLINIC | Age: 59
End: 2024-08-06
Payer: MEDICARE

## 2024-08-06 ENCOUNTER — HOSPITAL ENCOUNTER (OUTPATIENT)
Dept: RADIOLOGY | Facility: HOSPITAL | Age: 59
Discharge: HOME | End: 2024-08-06
Payer: MEDICARE

## 2024-08-06 DIAGNOSIS — K74.00 LIVER FIBROSIS: ICD-10-CM

## 2024-08-06 PROCEDURE — 76705 ECHO EXAM OF ABDOMEN: CPT | Mod: 26,,, | Performed by: RADIOLOGY

## 2024-08-06 PROCEDURE — 76705 ECHO EXAM OF ABDOMEN: CPT | Mod: TC

## 2024-08-09 DIAGNOSIS — Z71.89 COMPLEX CARE COORDINATION: ICD-10-CM

## 2024-08-15 ENCOUNTER — OFFICE VISIT (OUTPATIENT)
Dept: CARDIOLOGY | Facility: CLINIC | Age: 59
End: 2024-08-15
Payer: MEDICARE

## 2024-08-15 VITALS
HEART RATE: 80 BPM | HEIGHT: 65 IN | WEIGHT: 205 LBS | SYSTOLIC BLOOD PRESSURE: 144 MMHG | DIASTOLIC BLOOD PRESSURE: 86 MMHG | BODY MASS INDEX: 34.16 KG/M2

## 2024-08-15 DIAGNOSIS — I25.119 ATHEROSCLEROSIS OF NATIVE CORONARY ARTERY OF NATIVE HEART WITH ANGINA PECTORIS: ICD-10-CM

## 2024-08-15 DIAGNOSIS — R07.2 PRECORDIAL PAIN: ICD-10-CM

## 2024-08-15 DIAGNOSIS — F41.0 PANIC ATTACKS: Primary | ICD-10-CM

## 2024-08-15 DIAGNOSIS — R00.2 INTERMITTENT PALPITATIONS: ICD-10-CM

## 2024-08-15 DIAGNOSIS — F32.9 REACTIVE DEPRESSION: ICD-10-CM

## 2024-08-15 PROCEDURE — 3044F HG A1C LEVEL LT 7.0%: CPT | Mod: CPTII,,, | Performed by: INTERNAL MEDICINE

## 2024-08-15 PROCEDURE — 99999 PR PBB SHADOW E&M-EST. PATIENT-LVL IV: CPT | Mod: PBBFAC,,, | Performed by: INTERNAL MEDICINE

## 2024-08-15 PROCEDURE — 1159F MED LIST DOCD IN RCRD: CPT | Mod: CPTII,,, | Performed by: INTERNAL MEDICINE

## 2024-08-15 PROCEDURE — 99214 OFFICE O/P EST MOD 30 MIN: CPT | Mod: S$PBB,,, | Performed by: INTERNAL MEDICINE

## 2024-08-15 PROCEDURE — 3008F BODY MASS INDEX DOCD: CPT | Mod: CPTII,,, | Performed by: INTERNAL MEDICINE

## 2024-08-15 PROCEDURE — 99214 OFFICE O/P EST MOD 30 MIN: CPT | Mod: PBBFAC | Performed by: INTERNAL MEDICINE

## 2024-08-15 PROCEDURE — 3077F SYST BP >= 140 MM HG: CPT | Mod: CPTII,,, | Performed by: INTERNAL MEDICINE

## 2024-08-15 PROCEDURE — 3079F DIAST BP 80-89 MM HG: CPT | Mod: CPTII,,, | Performed by: INTERNAL MEDICINE

## 2024-08-15 RX ORDER — BUPROPION HYDROCHLORIDE 100 MG/1
100 TABLET, EXTENDED RELEASE ORAL DAILY
COMMUNITY

## 2024-08-15 NOTE — PROGRESS NOTES
PCP: Ayleen Casas FNP    Referring Provider:     Subjective:   Sheri Ho is a 59 y.o. female with hx of anxiety, asthma  who presents for evaluation of chest pain, shortness of breath.    She reports chest pain and shortness of breath have improved, however has noted fine macular rash since starting diltiazem,, pt reports no change in palpitations or fatigue. She notes episodes of palpitations are provoking anxiety.         Fhx:  Family History   Problem Relation Name Age of Onset    Hypertension Mother      Diabetes Father      Hypertension Father      Heart disease Father      Heart attack Father      Hypertension Sister      Hypertension Maternal Grandmother      Breast cancer Cousin        Shx:   Past Surgical History:   Procedure Laterality Date    ARTHROSCOPIC REPAIR OF ROTATOR CUFF OF SHOULDER Right 11/30/2021    Procedure: REPAIR, ROTATOR CUFF, ARTHROSCOPIC;  Surgeon: Cain Treviño MD;  Location: [facility] OR;  Service: Orthopedics;  Laterality: Right;    BLOCK, NERVE, SHOULDER Left 7/19/2023    Procedure: BLOCK, NERVE, SHOULDER;  Surgeon: Bernard Ellington MD;  Location: [facility]IN [facility];  Service: Pain Management;  Laterality: Left;  Left shoulder NB    BLOCK, NERVE, SHOULDER Right 4/10/2024    Procedure: BLOCK, NERVE, SHOULDER;  Surgeon: Bernard Ellington MD;  Location: [facility]IN [facility];  Service: Pain Management;  Laterality: Right;  Right shoulder NB    CHOLECYSTECTOMY      COLONOSCOPY      CYSTOSCOPY      ESOPHAGOGASTRODUODENOSCOPY      HYSTERECTOMY      LIPOMA RESECTION N/A 09/02/2022    Procedure: EXCISION, BACK LIPOMA;  Surgeon: Zane Crowe DO;  Location: [facility] OR;  Service: General;  Laterality: N/A;    OOPHORECTOMY      ROTATOR CUFF REPAIR      SHOULDER ARTHROSCOPY      SHOULDER ARTHROSCOPY Right 11/30/2021    Procedure: ARTHROSCOPY, SHOULDER;  Surgeon: Cain Treviño MD;  Location: [facility] OR;  Service: Orthopedics;  Laterality: Right;        EKG - NSR with  PVCs.     ECHO - No results found for this or any previous visit.       CATH - No results found for this or any previous visit.       Stress - No results found for this or any previous visit.       Lab Results   Component Value Date     07/01/2024    K 3.8 07/01/2024     07/01/2024    CO2 28 07/01/2024    BUN 5 (L) 07/01/2024    CREATININE 0.88 07/01/2024    CALCIUM 8.8 07/01/2024    ANIONGAP 13 07/01/2024    ESTGFRAFRICA 102 12/21/2021    EGFRNONAA 73 04/01/2022       Lab Results   Component Value Date    CHOL 166 04/06/2023    CHOL 175 04/01/2022    CHOL 167 08/18/2021     Lab Results   Component Value Date    HDL 56 04/06/2023    HDL 61 (H) 04/01/2022    HDL 58 08/18/2021     Lab Results   Component Value Date    LDLCALC 99 04/06/2023    LDLCALC 97 04/01/2022    LDLCALC 88 08/18/2021     Lab Results   Component Value Date    TRIG 54 04/06/2023    TRIG 87 04/01/2022    TRIG 104 08/18/2021     Lab Results   Component Value Date    CHOLHDL 3.0 04/06/2023    CHOLHDL 2.9 04/01/2022    CHOLHDL 2.9 08/18/2021       Lab Results   Component Value Date    WBC 6.32 07/01/2024    HGB 13.6 07/01/2024    HCT 41.3 07/01/2024    MCV 91.2 07/01/2024     07/01/2024           Current Outpatient Medications:     albuterol (PROVENTIL/VENTOLIN HFA) 90 mcg/actuation inhaler, Inhale 2 puffs into the lungs every 4 (four) hours as needed for Wheezing., Disp: 54 g, Rfl: 3    albuterol sulfate 90 mcg/actuation aebs, Inhale 180 mcg into the lungs every 6 (six) hours as needed., Disp: , Rfl:     aspirin (ECOTRIN) 81 MG EC tablet, Take 81 mg by mouth once daily., Disp: , Rfl:     budesonide-formoterol 160-4.5 mcg (SYMBICORT) 160-4.5 mcg/actuation HFAA, Inhale 2 puffs into the lungs 2 (two) times a day., Disp: 30.6 g, Rfl: 3    buPROPion (WELLBUTRIN SR) 100 MG TBSR 12 hr tablet, Take 100 mg by mouth once daily., Disp: , Rfl:     cyclobenzaprine (FLEXERIL) 10 MG tablet, Take 10 mg by mouth 3 (three) times daily., Disp: , Rfl:      diltiaZEM (CARDIZEM CD) 240 MG 24 hr capsule, Take 1 capsule (240 mg total) by mouth once daily., Disp: 30 capsule, Rfl: 5    EScitalopram oxalate (LEXAPRO) 20 MG tablet, Take 1 tablet (20 mg total) by mouth once daily., Disp: 90 tablet, Rfl: 3    hydrALAZINE (APRESOLINE) 10 MG tablet, Take 1 tablet (10 mg total) by mouth 2 (two) times daily., Disp: 180 tablet, Rfl: 3    HYDROcodone-acetaminophen (NORCO)  mg per tablet, Take 1 tablet by mouth every 8 (eight) hours as needed., Disp: , Rfl:     hydrOXYzine HCL (ATARAX) 10 MG Tab, 1-2 tabs Q6hrs prn for nerves, itching, insomnia, or nausea, Disp: 120 tablet, Rfl: 5    ibuprofen (ADVIL,MOTRIN) 800 MG tablet, Take 1 tablet (800 mg total) by mouth 2 (two) times a day., Disp: 180 tablet, Rfl: 1    LINZESS 145 mcg Cap capsule, TAKE 1 CAPSULE DAILY BEFORE BREAKFAST., Disp: 90 capsule, Rfl: 3    multivitamin (MULTIPLE VITAMINS ORAL), Take 1 tablet by mouth once daily. OTC: w/ Iron & Folic Acid, Disp: , Rfl:     pantoprazole (PROTONIX) 40 MG tablet, Take 1 tablet (40 mg total) by mouth 2 (two) times daily., Disp: 180 tablet, Rfl: 3    cetirizine (ZYRTEC) 10 MG tablet, Take 1 tablet (10 mg total) by mouth once daily. (Patient not taking: Reported on 8/15/2024), Disp: 90 tablet, Rfl: 3    MIRABEGRON ORAL, Take by mouth. (Patient not taking: Reported on 8/15/2024), Disp: , Rfl:     progesterone (PROMETRIUM) 200 MG capsule, Take 1 capsule by mouth every evening. (Patient not taking: Reported on 8/15/2024), Disp: , Rfl:   No current facility-administered medications for this visit.    Facility-Administered Medications Ordered in Other Visits:     0.9%  NaCl infusion, , Intravenous, Continuous, Cain Treviño MD, Last Rate: 500 mL/hr at 09/02/22 1002, New Bag at 07/19/23 1458    Review of Systems   Constitutional: Negative for diaphoresis, malaise/fatigue, night sweats and weight gain.   HENT:  Positive for congestion. Negative for ear pain, hearing loss, nosebleeds and  "sore throat.         Sesonal allergies   Eyes:  Negative for blurred vision, double vision, pain, photophobia and visual disturbance.   Cardiovascular:  Positive for chest pain and palpitations. Negative for claudication, dyspnea on exertion, irregular heartbeat, leg swelling, near-syncope, orthopnea and syncope.   Respiratory:  Negative for cough, shortness of breath, sleep disturbances due to breathing, snoring and wheezing.    Endocrine: Negative for cold intolerance, heat intolerance, polydipsia, polyphagia and polyuria.   Hematologic/Lymphatic: Negative for bleeding problem. Does not bruise/bleed easily.   Skin:  Negative for dry skin, flushing, itching, rash and skin cancer.   Musculoskeletal:  Negative for arthritis, back pain, falls, joint pain, muscle cramps, muscle weakness and myalgias.   Gastrointestinal:  Positive for change in bowel habit, constipation, diarrhea and heartburn. Negative for abdominal pain, dysphagia, nausea and vomiting.        HX IBS   Genitourinary:  Negative for bladder incontinence, dysuria, flank pain, frequency and nocturia.        Recurrent UTIs   Neurological:  Positive for light-headedness. Negative for dizziness, focal weakness, headaches, loss of balance, numbness, paresthesias and seizures.   Psychiatric/Behavioral:  Positive for depression. Negative for memory loss and substance abuse. The patient is nervous/anxious.         Anxiety attacks.    Allergic/Immunologic: Negative for environmental allergies.          Objective:   BP (!) 144/86 (BP Location: Left arm, Patient Position: Sitting)   Pulse 80   Ht 5' 5" (1.651 m)   Wt 93 kg (205 lb)   BMI 34.11 kg/m²       Physical Exam  Vitals and nursing note reviewed.   Constitutional:       Appearance: Normal appearance. She is obese.   HENT:      Head: Normocephalic and atraumatic.      Right Ear: External ear normal.      Left Ear: External ear normal.   Eyes:      General: No scleral icterus.        Right eye: No " discharge.         Left eye: No discharge.      Extraocular Movements: Extraocular movements intact.      Conjunctiva/sclera: Conjunctivae normal.      Pupils: Pupils are equal, round, and reactive to light.   Cardiovascular:      Rate and Rhythm: Normal rate and regular rhythm.      Pulses: Normal pulses.      Heart sounds: Normal heart sounds. No murmur heard.     No friction rub. No gallop.   Pulmonary:      Effort: Pulmonary effort is normal.      Breath sounds: Normal breath sounds. No wheezing, rhonchi or rales.   Chest:      Chest wall: No tenderness.   Abdominal:      General: Abdomen is flat. Bowel sounds are normal. There is no distension.      Palpations: Abdomen is soft.      Tenderness: There is no abdominal tenderness. There is no guarding or rebound.   Musculoskeletal:         General: No swelling or tenderness. Normal range of motion.      Cervical back: Normal range of motion and neck supple.      Right lower leg: No edema.   Skin:     General: Skin is warm and dry.      Findings: No erythema or rash.   Neurological:      General: No focal deficit present.      Mental Status: She is alert and oriented to person, place, and time.      Cranial Nerves: No cranial nerve deficit.      Motor: No weakness.      Gait: Gait normal.   Psychiatric:         Mood and Affect: Mood normal.         Behavior: Behavior normal.         Thought Content: Thought content normal.         Judgment: Judgment normal.   Results for orders placed during the hospital encounter of 06/03/24    Echo    Interpretation Summary    Left Ventricle: The left ventricle is normal in size. Normal wall thickness. There is normal systolic function with a visually estimated ejection fraction of 55 - 70%. There is normal diastolic function.    Right Ventricle: Normal right ventricular cavity size. Systolic function is normal.    Aortic Valve: The aortic valve is a trileaflet valve.    Mitral Valve: There is mild regurgitation.    Tricuspid  Valve: There is mild regurgitation.     Results for orders placed during the hospital encounter of 06/03/24    Nuclear Stress Test    Interpretation Summary    The ECG portion of the study is negative for ischemia.  Details    Reading Physician Reading Date Result Priority   Tereza Patel MD  [phone] 6/3/2024 Routine     Narrative & Impression  EXAMINATION:  NM MYOCARDIAL PERFUSION SPECT MULTI STUDY     CLINICAL HISTORY:  chest pain;  Chest pain, unspecified     TECHNIQUE:  SPECT images in short, vertical and horizontal long axis were acquired 30 minutes after the injection of 11.6 mCi of Tc-99m sestamibi at rest and 36 mCi during a cardiac stress. The clinical stress and ECG portion of the study is to be read separately.     COMPARISON:  None.     FINDINGS:  The quality of the study is good VS is compromised by patient motion/GI activity adjacent to the inferior wall.     Small size, mild intensity fixed apical anterior defect with normal wall motion and thickening, likely artifact.     The gated post-stress images reveal normal wall motion and normal systolic wall thickening with an estimated LVEF of 68 %. The LV cavity (is not) dilated with an end-diastolic volume of (67 ml- normal less than 140) ml and an end-systolic volume of (21 ml- normal less than 70) ml.     Impression:     1.  Scintigraphically negative for ischemia or infarct.  2. Small size, mild intensity fixed apical anterior defect with normal wall motion and thickening, likely artifact.  3. the global left ventricular systolic function is normal with an LV ejection fraction of 68 % and no evidence of LV dilatation. Wall motion is normal.       The patient reported no chest pain during the stress test.      Zio shows predominant NSR, symptomatic PACs, PVCs, short runs SVT  Assessment:     1. Panic attacks        2. Reactive depression        3. Precordial pain        4. Atherosclerosis of native coronary artery of native heart with angina  pectoris              Plan:   Chest pain, atypical, has improved, treadmill cardiolyte stress test negative for ischemic substrate, unlikely cardiac cause of chest pain,   Palpitations, symptomatic PACs, PVCs,  SVT, improved with diltiazem   mg q d, Unable to tolerate due to rash, will change to Verapamil CD, 120 mg q d, continue to monitor symptoms   Hypertension: controlled, monitor with change to verapamil,  . Morbid obesity, discussed lifestyle changes.   6.   Asthma: controlled on MDIs  7.   IBS: controlled  8.   GERD: controlled    Follow up in four to six weeks.

## 2024-08-18 RX ORDER — VERAPAMIL HYDROCHLORIDE 120 MG/1
120 CAPSULE, EXTENDED RELEASE ORAL DAILY
Qty: 30 CAPSULE | Refills: 11 | Status: SHIPPED | OUTPATIENT
Start: 2024-08-18 | End: 2025-08-18

## 2024-08-27 DIAGNOSIS — I10 HYPERTENSION, UNSPECIFIED TYPE: ICD-10-CM

## 2024-08-27 RX ORDER — HYDRALAZINE HYDROCHLORIDE 10 MG/1
10 TABLET, FILM COATED ORAL 2 TIMES DAILY
Qty: 180 TABLET | Refills: 3 | Status: SHIPPED | OUTPATIENT
Start: 2024-08-27 | End: 2024-08-30 | Stop reason: SDUPTHER

## 2024-08-30 DIAGNOSIS — I10 HYPERTENSION, UNSPECIFIED TYPE: ICD-10-CM

## 2024-08-30 RX ORDER — HYDRALAZINE HYDROCHLORIDE 10 MG/1
10 TABLET, FILM COATED ORAL 2 TIMES DAILY
Qty: 60 TABLET | Refills: 0 | Status: SHIPPED | OUTPATIENT
Start: 2024-08-30

## 2024-08-30 NOTE — TELEPHONE ENCOUNTER
Pt called stating that you sent her hydralazine to [facility] pharmacy and  its gone be 10 days before she can get medication and they was wondering if you can send it in to [facility] pharmacy on [address] thanks !

## 2024-09-20 ENCOUNTER — OFFICE VISIT (OUTPATIENT)
Dept: CARDIOLOGY | Facility: CLINIC | Age: 59
End: 2024-09-20
Payer: MEDICARE

## 2024-09-20 VITALS
HEART RATE: 84 BPM | BODY MASS INDEX: 32.99 KG/M2 | HEIGHT: 65 IN | WEIGHT: 198 LBS | DIASTOLIC BLOOD PRESSURE: 84 MMHG | SYSTOLIC BLOOD PRESSURE: 130 MMHG

## 2024-09-20 DIAGNOSIS — I25.119 ATHEROSCLEROSIS OF NATIVE CORONARY ARTERY OF NATIVE HEART WITH ANGINA PECTORIS: ICD-10-CM

## 2024-09-20 DIAGNOSIS — R00.2 INTERMITTENT PALPITATIONS: ICD-10-CM

## 2024-09-20 DIAGNOSIS — I10 ESSENTIAL HYPERTENSION, BENIGN: Primary | Chronic | ICD-10-CM

## 2024-09-20 PROBLEM — R07.9 CHEST PAIN: Status: RESOLVED | Noted: 2024-04-30 | Resolved: 2024-09-20

## 2024-09-20 PROCEDURE — 3008F BODY MASS INDEX DOCD: CPT | Mod: CPTII,,, | Performed by: INTERNAL MEDICINE

## 2024-09-20 PROCEDURE — 99214 OFFICE O/P EST MOD 30 MIN: CPT | Mod: S$PBB,,, | Performed by: INTERNAL MEDICINE

## 2024-09-20 PROCEDURE — 3075F SYST BP GE 130 - 139MM HG: CPT | Mod: CPTII,,, | Performed by: INTERNAL MEDICINE

## 2024-09-20 PROCEDURE — 99214 OFFICE O/P EST MOD 30 MIN: CPT | Mod: PBBFAC | Performed by: INTERNAL MEDICINE

## 2024-09-20 PROCEDURE — 1159F MED LIST DOCD IN RCRD: CPT | Mod: CPTII,,, | Performed by: INTERNAL MEDICINE

## 2024-09-20 PROCEDURE — 3044F HG A1C LEVEL LT 7.0%: CPT | Mod: CPTII,,, | Performed by: INTERNAL MEDICINE

## 2024-09-20 PROCEDURE — 99999 PR PBB SHADOW E&M-EST. PATIENT-LVL IV: CPT | Mod: PBBFAC,,, | Performed by: INTERNAL MEDICINE

## 2024-09-20 PROCEDURE — 3079F DIAST BP 80-89 MM HG: CPT | Mod: CPTII,,, | Performed by: INTERNAL MEDICINE

## 2024-09-20 NOTE — PROGRESS NOTES
PCP: Ayleen Casas FNP    Referring Provider:     Subjective:   Sheri Ho is a 59 y.o. female with hx of anxiety, asthma  who presents for evaluation of chest pain, shortness of breath.    She reports chest pain and shortness of breath have improved, tolerating verapmil without side effects.  She is under increased stress caring for her terminal brother.  She is active, walking daily without symptoms.  She notes episodes of palpitations are provoking anxiety are much better controlled. .         Fhx:  Family History   Problem Relation Name Age of Onset    Hypertension Mother      Diabetes Father      Hypertension Father      Heart disease Father      Heart attack Father      Hypertension Sister      Hypertension Maternal Grandmother      Breast cancer Cousin        Shx:   Past Surgical History:   Procedure Laterality Date    ARTHROSCOPIC REPAIR OF ROTATOR CUFF OF SHOULDER Right 11/30/2021    Procedure: REPAIR, ROTATOR CUFF, ARTHROSCOPIC;  Surgeon: Cain Treviño MD;  Location: [facility] OR;  Service: Orthopedics;  Laterality: Right;    BLOCK, NERVE, SHOULDER Left 7/19/2023    Procedure: BLOCK, NERVE, SHOULDER;  Surgeon: Bernard Ellington MD;  Location: [facility];  Service: Pain Management;  Laterality: Left;  Left shoulder NB    BLOCK, NERVE, SHOULDER Right 4/10/2024    Procedure: BLOCK, NERVE, SHOULDER;  Surgeon: Bernard Ellington MD;  Location: [facility];  Service: Pain Management;  Laterality: Right;  Right shoulder NB    CHOLECYSTECTOMY      COLONOSCOPY      CYSTOSCOPY      ESOPHAGOGASTRODUODENOSCOPY      HYSTERECTOMY      LIPOMA RESECTION N/A 09/02/2022    Procedure: EXCISION, BACK LIPOMA;  Surgeon: Zane Crowe DO;  Location: [facility] OR;  Service: General;  Laterality: N/A;    OOPHORECTOMY      ROTATOR CUFF REPAIR      SHOULDER ARTHROSCOPY      SHOULDER ARTHROSCOPY Right 11/30/2021    Procedure: ARTHROSCOPY, SHOULDER;  Surgeon: Cain Treviño MD;  Location: [facility]  ORTHO OR;  Service: Orthopedics;  Laterality: Right;        EKG - NSR with PVCs.     ECHO - No results found for this or any previous visit.       CATH - No results found for this or any previous visit.       Stress - No results found for this or any previous visit.       Lab Results   Component Value Date     07/01/2024    K 3.8 07/01/2024     07/01/2024    CO2 28 07/01/2024    BUN 5 (L) 07/01/2024    CREATININE 0.88 07/01/2024    CALCIUM 8.8 07/01/2024    ANIONGAP 13 07/01/2024    ESTGFRAFRICA 102 12/21/2021    EGFRNONAA 73 04/01/2022       Lab Results   Component Value Date    CHOL 166 04/06/2023    CHOL 175 04/01/2022    CHOL 167 08/18/2021     Lab Results   Component Value Date    HDL 56 04/06/2023    HDL 61 (H) 04/01/2022    HDL 58 08/18/2021     Lab Results   Component Value Date    LDLCALC 99 04/06/2023    LDLCALC 97 04/01/2022    LDLCALC 88 08/18/2021     Lab Results   Component Value Date    TRIG 54 04/06/2023    TRIG 87 04/01/2022    TRIG 104 08/18/2021     Lab Results   Component Value Date    CHOLHDL 3.0 04/06/2023    CHOLHDL 2.9 04/01/2022    CHOLHDL 2.9 08/18/2021       Lab Results   Component Value Date    WBC 6.32 07/01/2024    HGB 13.6 07/01/2024    HCT 41.3 07/01/2024    MCV 91.2 07/01/2024     07/01/2024           Current Outpatient Medications:     albuterol (PROVENTIL/VENTOLIN HFA) 90 mcg/actuation inhaler, Inhale 2 puffs into the lungs every 4 (four) hours as needed for Wheezing., Disp: 54 g, Rfl: 3    albuterol sulfate 90 mcg/actuation aebs, Inhale 180 mcg into the lungs every 6 (six) hours as needed., Disp: , Rfl:     aspirin (ECOTRIN) 81 MG EC tablet, Take 81 mg by mouth once daily., Disp: , Rfl:     budesonide-formoterol 160-4.5 mcg (SYMBICORT) 160-4.5 mcg/actuation HFAA, Inhale 2 puffs into the lungs 2 (two) times a day., Disp: 30.6 g, Rfl: 3    buPROPion (WELLBUTRIN SR) 100 MG TBSR 12 hr tablet, Take 100 mg by mouth once daily., Disp: , Rfl:     cetirizine (ZYRTEC) 10  MG tablet, Take 1 tablet (10 mg total) by mouth once daily., Disp: 90 tablet, Rfl: 3    cyclobenzaprine (FLEXERIL) 10 MG tablet, Take 10 mg by mouth 3 (three) times daily., Disp: , Rfl:     EScitalopram oxalate (LEXAPRO) 20 MG tablet, Take 1 tablet (20 mg total) by mouth once daily. (Patient taking differently: Take 20 mg by mouth once daily. Patient states that she was prescribed an extra 5 mg), Disp: 90 tablet, Rfl: 3    hydrALAZINE (APRESOLINE) 10 MG tablet, Take 1 tablet (10 mg total) by mouth 2 (two) times daily., Disp: 60 tablet, Rfl: 0    HYDROcodone-acetaminophen (NORCO)  mg per tablet, Take 1 tablet by mouth every 8 (eight) hours as needed., Disp: , Rfl:     hydrOXYzine HCL (ATARAX) 10 MG Tab, 1-2 tabs Q6hrs prn for nerves, itching, insomnia, or nausea, Disp: 120 tablet, Rfl: 5    ibuprofen (ADVIL,MOTRIN) 800 MG tablet, Take 1 tablet (800 mg total) by mouth 2 (two) times a day., Disp: 180 tablet, Rfl: 1    LINZESS 145 mcg Cap capsule, TAKE 1 CAPSULE DAILY BEFORE BREAKFAST., Disp: 90 capsule, Rfl: 3    multivitamin (MULTIPLE VITAMINS ORAL), Take 1 tablet by mouth once daily. OTC: w/ Iron & Folic Acid, Disp: , Rfl:     pantoprazole (PROTONIX) 40 MG tablet, Take 1 tablet (40 mg total) by mouth 2 (two) times daily., Disp: 180 tablet, Rfl: 3    verapamiL (VERELAN) 120 MG C24P, Take 1 capsule (120 mg total) by mouth once daily., Disp: 30 capsule, Rfl: 11    MIRABEGRON ORAL, Take by mouth. (Patient not taking: Reported on 8/15/2024), Disp: , Rfl:     progesterone (PROMETRIUM) 200 MG capsule, Take 1 capsule by mouth every evening. (Patient not taking: Reported on 8/15/2024), Disp: , Rfl:   No current facility-administered medications for this visit.    Facility-Administered Medications Ordered in Other Visits:     0.9%  NaCl infusion, , Intravenous, Continuous, Cain Treviño MD, Last Rate: 500 mL/hr at 09/02/22 1002, New Bag at 07/19/23 1278    Review of Systems   Constitutional: Negative for diaphoresis,  "malaise/fatigue, night sweats and weight gain.   HENT:  Positive for congestion. Negative for ear pain, hearing loss, nosebleeds and sore throat.         Sesonal allergies   Eyes:  Negative for blurred vision, double vision, pain, photophobia and visual disturbance.   Cardiovascular:  Positive for chest pain and palpitations. Negative for claudication, dyspnea on exertion, irregular heartbeat, leg swelling, near-syncope, orthopnea and syncope.   Respiratory:  Negative for cough, shortness of breath, sleep disturbances due to breathing, snoring and wheezing.    Endocrine: Negative for cold intolerance, heat intolerance, polydipsia, polyphagia and polyuria.   Hematologic/Lymphatic: Negative for bleeding problem. Does not bruise/bleed easily.   Skin:  Negative for dry skin, flushing, itching, rash and skin cancer.   Musculoskeletal:  Negative for arthritis, back pain, falls, joint pain, muscle cramps, muscle weakness and myalgias.   Gastrointestinal:  Positive for change in bowel habit, constipation, diarrhea and heartburn. Negative for abdominal pain, dysphagia, nausea and vomiting.        HX IBS   Genitourinary:  Negative for bladder incontinence, dysuria, flank pain, frequency and nocturia.        Recurrent UTIs   Neurological:  Positive for light-headedness. Negative for dizziness, focal weakness, headaches, loss of balance, numbness, paresthesias and seizures.   Psychiatric/Behavioral:  Positive for depression. Negative for memory loss and substance abuse. The patient is nervous/anxious.         Anxiety attacks.    Allergic/Immunologic: Negative for environmental allergies.          Objective:   /84 (BP Location: Left arm, Patient Position: Sitting)   Pulse 84   Ht 5' 5" (1.651 m)   Wt 89.8 kg (198 lb)   BMI 32.95 kg/m²       Physical Exam  Vitals and nursing note reviewed.   Constitutional:       Appearance: Normal appearance. She is obese.   HENT:      Head: Normocephalic and atraumatic.      Right " Ear: External ear normal.      Left Ear: External ear normal.   Eyes:      General: No scleral icterus.        Right eye: No discharge.         Left eye: No discharge.      Extraocular Movements: Extraocular movements intact.      Conjunctiva/sclera: Conjunctivae normal.      Pupils: Pupils are equal, round, and reactive to light.   Cardiovascular:      Rate and Rhythm: Normal rate and regular rhythm.      Pulses: Normal pulses.      Heart sounds: Normal heart sounds. No murmur heard.     No friction rub. No gallop.   Pulmonary:      Effort: Pulmonary effort is normal.      Breath sounds: Normal breath sounds. No wheezing, rhonchi or rales.   Chest:      Chest wall: No tenderness.   Abdominal:      General: Abdomen is flat. Bowel sounds are normal. There is no distension.      Palpations: Abdomen is soft.      Tenderness: There is no abdominal tenderness. There is no guarding or rebound.   Musculoskeletal:         General: No swelling or tenderness. Normal range of motion.      Cervical back: Normal range of motion and neck supple.      Right lower leg: No edema.   Skin:     General: Skin is warm and dry.      Findings: No erythema or rash.   Neurological:      General: No focal deficit present.      Mental Status: She is alert and oriented to person, place, and time.      Cranial Nerves: No cranial nerve deficit.      Motor: No weakness.      Gait: Gait normal.   Psychiatric:         Mood and Affect: Mood normal.         Behavior: Behavior normal.         Thought Content: Thought content normal.         Judgment: Judgment normal.     Results for orders placed during the hospital encounter of 06/03/24    Echo    Interpretation Summary    Left Ventricle: The left ventricle is normal in size. Normal wall thickness. There is normal systolic function with a visually estimated ejection fraction of 55 - 70%. There is normal diastolic function.    Right Ventricle: Normal right ventricular cavity size. Systolic function is  normal.    Aortic Valve: The aortic valve is a trileaflet valve.    Mitral Valve: There is mild regurgitation.    Tricuspid Valve: There is mild regurgitation.     Results for orders placed during the hospital encounter of 06/03/24    Nuclear Stress Test    Interpretation Summary    The ECG portion of the study is negative for ischemia.  Details    Reading Physician Reading Date Result Priority   Tereza Patel MD  [phone] 6/3/2024 Routine     Narrative & Impression  EXAMINATION:  NM MYOCARDIAL PERFUSION SPECT MULTI STUDY     CLINICAL HISTORY:  chest pain;  Chest pain, unspecified     TECHNIQUE:  SPECT images in short, vertical and horizontal long axis were acquired 30 minutes after the injection of 11.6 mCi of Tc-99m sestamibi at rest and 36 mCi during a cardiac stress. The clinical stress and ECG portion of the study is to be read separately.     COMPARISON:  None.     FINDINGS:  The quality of the study is good VS is compromised by patient motion/GI activity adjacent to the inferior wall.     Small size, mild intensity fixed apical anterior defect with normal wall motion and thickening, likely artifact.     The gated post-stress images reveal normal wall motion and normal systolic wall thickening with an estimated LVEF of 68 %. The LV cavity (is not) dilated with an end-diastolic volume of (67 ml- normal less than 140) ml and an end-systolic volume of (21 ml- normal less than 70) ml.     Impression:     1.  Scintigraphically negative for ischemia or infarct.  2. Small size, mild intensity fixed apical anterior defect with normal wall motion and thickening, likely artifact.  3. the global left ventricular systolic function is normal with an LV ejection fraction of 68 % and no evidence of LV dilatation. Wall motion is normal.       The patient reported no chest pain during the stress test.      Zio shows predominant NSR, symptomatic PACs, PVCs, short runs SVT  Assessment:     1. Essential hypertension, benign         2. Intermittent palpitations        3. Atherosclerosis of native coronary artery of native heart with angina pectoris              Plan:   Chest pain, atypical, has improved, treadmill cardiolyte stress test negative for ischemic substrate, unlikely cardiac cause of chest pain,   Palpitations, symptomatic PACs, PVCs,  SVT, now contolled on Verapamil CD, 120 mg q d, Hypertension: controlled, monitor with change to verapamil,  Morbid obesity, discussed lifestyle changes.   6.   Asthma: controlled on MDIs  7.   IBS: controlled  8.   GERD: controlled    Follow up in six months with Alysa Valladares NP, see me in one year.

## 2024-09-21 DIAGNOSIS — I10 HYPERTENSION, UNSPECIFIED TYPE: ICD-10-CM

## 2024-09-22 RX ORDER — HYDRALAZINE HYDROCHLORIDE 10 MG/1
10 TABLET, FILM COATED ORAL 2 TIMES DAILY
Qty: 180 TABLET | Refills: 1 | Status: SHIPPED | OUTPATIENT
Start: 2024-09-22

## 2024-10-30 ENCOUNTER — OFFICE VISIT (OUTPATIENT)
Dept: FAMILY MEDICINE | Facility: CLINIC | Age: 59
End: 2024-10-30
Payer: MEDICARE

## 2024-10-30 VITALS
WEIGHT: 198.88 LBS | SYSTOLIC BLOOD PRESSURE: 129 MMHG | HEART RATE: 76 BPM | OXYGEN SATURATION: 89 % | DIASTOLIC BLOOD PRESSURE: 85 MMHG | BODY MASS INDEX: 33.13 KG/M2 | RESPIRATION RATE: 18 BRPM | HEIGHT: 65 IN

## 2024-10-30 DIAGNOSIS — F32.A DEPRESSION, UNSPECIFIED DEPRESSION TYPE: ICD-10-CM

## 2024-10-30 DIAGNOSIS — I10 ESSENTIAL HYPERTENSION, BENIGN: Primary | ICD-10-CM

## 2024-10-30 DIAGNOSIS — Z91.09 ENVIRONMENTAL ALLERGIES: ICD-10-CM

## 2024-10-30 DIAGNOSIS — R73.03 PREDIABETES: ICD-10-CM

## 2024-10-30 PROBLEM — R10.9 ABDOMINAL PAIN: Status: RESOLVED | Noted: 2021-11-01 | Resolved: 2024-10-30

## 2024-10-30 PROBLEM — R13.10 DYSPHAGIA: Status: RESOLVED | Noted: 2021-04-29 | Resolved: 2024-10-30

## 2024-10-30 PROBLEM — K20.90 ESOPHAGITIS: Status: RESOLVED | Noted: 2023-10-31 | Resolved: 2024-10-30

## 2024-10-30 LAB
ALBUMIN SERPL BCP-MCNC: 3.4 G/DL (ref 3.5–5)
ALBUMIN/GLOB SERPL: 1 {RATIO}
ALP SERPL-CCNC: 94 U/L (ref 46–118)
ALT SERPL W P-5'-P-CCNC: 27 U/L (ref 13–56)
ANION GAP SERPL CALCULATED.3IONS-SCNC: 10 MMOL/L (ref 7–16)
AST SERPL W P-5'-P-CCNC: 18 U/L (ref 15–37)
BASOPHILS # BLD AUTO: 0.04 K/UL (ref 0–0.2)
BASOPHILS NFR BLD AUTO: 1.1 % (ref 0–1)
BILIRUB SERPL-MCNC: 0.7 MG/DL (ref ?–1.2)
BILIRUB UR QL STRIP: NEGATIVE
BUN SERPL-MCNC: 9 MG/DL (ref 7–18)
BUN/CREAT SERPL: 10 (ref 6–20)
CALCIUM SERPL-MCNC: 8.8 MG/DL (ref 8.5–10.1)
CHLORIDE SERPL-SCNC: 106 MMOL/L (ref 98–107)
CLARITY UR: ABNORMAL
CO2 SERPL-SCNC: 27 MMOL/L (ref 21–32)
COLOR UR: YELLOW
CREAT SERPL-MCNC: 0.92 MG/DL (ref 0.55–1.02)
DIFFERENTIAL METHOD BLD: ABNORMAL
EGFR (NO RACE VARIABLE) (RUSH/TITUS): 72 ML/MIN/1.73M2
EOSINOPHIL # BLD AUTO: 0.09 K/UL (ref 0–0.5)
EOSINOPHIL NFR BLD AUTO: 2.5 % (ref 1–4)
ERYTHROCYTE [DISTWIDTH] IN BLOOD BY AUTOMATED COUNT: 12.8 % (ref 11.5–14.5)
EST. AVERAGE GLUCOSE BLD GHB EST-MCNC: 131 MG/DL
GLOBULIN SER-MCNC: 3.5 G/DL (ref 2–4)
GLUCOSE SERPL-MCNC: 140 MG/DL (ref 74–106)
GLUCOSE UR STRIP-MCNC: NORMAL MG/DL
HBA1C MFR BLD HPLC: 6.2 % (ref 4.5–6.6)
HCT VFR BLD AUTO: 40.1 % (ref 38–47)
HGB BLD-MCNC: 12.9 G/DL (ref 12–16)
IMM GRANULOCYTES # BLD AUTO: 0.01 K/UL (ref 0–0.04)
IMM GRANULOCYTES NFR BLD: 0.3 % (ref 0–0.4)
KETONES UR STRIP-SCNC: NEGATIVE MG/DL
LEUKOCYTE ESTERASE UR QL STRIP: NEGATIVE
LYMPHOCYTES # BLD AUTO: 1.8 K/UL (ref 1–4.8)
LYMPHOCYTES NFR BLD AUTO: 49.5 % (ref 27–41)
MCH RBC QN AUTO: 29.5 PG (ref 27–31)
MCHC RBC AUTO-ENTMCNC: 32.2 G/DL (ref 32–36)
MCV RBC AUTO: 91.8 FL (ref 80–96)
MONOCYTES # BLD AUTO: 0.31 K/UL (ref 0–0.8)
MONOCYTES NFR BLD AUTO: 8.5 % (ref 2–6)
MPC BLD CALC-MCNC: 10.5 FL (ref 9.4–12.4)
NEUTROPHILS # BLD AUTO: 1.39 K/UL (ref 1.8–7.7)
NEUTROPHILS NFR BLD AUTO: 38.1 % (ref 53–65)
NITRITE UR QL STRIP: NEGATIVE
NRBC # BLD AUTO: 0 X10E3/UL
NRBC, AUTO (.00): 0 %
PH UR STRIP: 5.5 PH UNITS
PLATELET # BLD AUTO: 308 K/UL (ref 150–400)
POTASSIUM SERPL-SCNC: 3.4 MMOL/L (ref 3.5–5.1)
PROT SERPL-MCNC: 6.9 G/DL (ref 6.4–8.2)
PROT UR QL STRIP: 20
RBC # BLD AUTO: 4.37 M/UL (ref 4.2–5.4)
RBC # UR STRIP: NEGATIVE /UL
SODIUM SERPL-SCNC: 140 MMOL/L (ref 136–145)
SP GR UR STRIP: 1.03
UROBILINOGEN UR STRIP-ACNC: 2 MG/DL
WBC # BLD AUTO: 3.64 K/UL (ref 4.5–11)

## 2024-10-30 PROCEDURE — 1159F MED LIST DOCD IN RCRD: CPT | Mod: ,,, | Performed by: NURSE PRACTITIONER

## 2024-10-30 PROCEDURE — 3079F DIAST BP 80-89 MM HG: CPT | Mod: ,,, | Performed by: NURSE PRACTITIONER

## 2024-10-30 PROCEDURE — 3074F SYST BP LT 130 MM HG: CPT | Mod: ,,, | Performed by: NURSE PRACTITIONER

## 2024-10-30 PROCEDURE — 81003 URINALYSIS AUTO W/O SCOPE: CPT | Mod: QW,,, | Performed by: CLINICAL MEDICAL LABORATORY

## 2024-10-30 PROCEDURE — 99214 OFFICE O/P EST MOD 30 MIN: CPT | Mod: ,,, | Performed by: NURSE PRACTITIONER

## 2024-10-30 PROCEDURE — 83036 HEMOGLOBIN GLYCOSYLATED A1C: CPT | Mod: ,,, | Performed by: CLINICAL MEDICAL LABORATORY

## 2024-10-30 PROCEDURE — 1160F RVW MEDS BY RX/DR IN RCRD: CPT | Mod: ,,, | Performed by: NURSE PRACTITIONER

## 2024-10-30 PROCEDURE — 85025 COMPLETE CBC W/AUTO DIFF WBC: CPT | Mod: ,,, | Performed by: CLINICAL MEDICAL LABORATORY

## 2024-10-30 PROCEDURE — 3008F BODY MASS INDEX DOCD: CPT | Mod: ,,, | Performed by: NURSE PRACTITIONER

## 2024-10-30 PROCEDURE — 80053 COMPREHEN METABOLIC PANEL: CPT | Mod: ,,, | Performed by: CLINICAL MEDICAL LABORATORY

## 2024-10-30 PROCEDURE — 3044F HG A1C LEVEL LT 7.0%: CPT | Mod: ,,, | Performed by: NURSE PRACTITIONER

## 2024-10-30 RX ORDER — ESCITALOPRAM OXALATE 20 MG/1
20 TABLET ORAL DAILY
Qty: 90 TABLET | Refills: 3 | Status: SHIPPED | OUTPATIENT
Start: 2024-10-30

## 2024-10-30 RX ORDER — CETIRIZINE HYDROCHLORIDE 10 MG/1
10 TABLET ORAL DAILY
Qty: 90 TABLET | Refills: 3 | Status: SHIPPED | OUTPATIENT
Start: 2024-10-30

## 2024-10-31 PROBLEM — E11.9 TYPE 2 DIABETES MELLITUS WITHOUT COMPLICATIONS: Status: RESOLVED | Noted: 2024-07-12 | Resolved: 2024-10-31

## 2024-11-01 ENCOUNTER — TELEPHONE (OUTPATIENT)
Dept: FAMILY MEDICINE | Facility: CLINIC | Age: 59
End: 2024-11-01
Payer: MEDICARE

## 2024-11-20 ENCOUNTER — OFFICE VISIT (OUTPATIENT)
Dept: FAMILY MEDICINE | Facility: CLINIC | Age: 59
End: 2024-11-20
Payer: MEDICARE

## 2024-11-20 VITALS
BODY MASS INDEX: 33.32 KG/M2 | TEMPERATURE: 98 F | OXYGEN SATURATION: 97 % | RESPIRATION RATE: 18 BRPM | DIASTOLIC BLOOD PRESSURE: 76 MMHG | WEIGHT: 200 LBS | SYSTOLIC BLOOD PRESSURE: 131 MMHG | HEIGHT: 65 IN | HEART RATE: 80 BPM

## 2024-11-20 DIAGNOSIS — J32.9 SINUSITIS, UNSPECIFIED CHRONICITY, UNSPECIFIED LOCATION: Primary | ICD-10-CM

## 2024-11-20 DIAGNOSIS — Z20.828 CONTACT WITH OR EXPOSURE TO VIRAL DISEASE: ICD-10-CM

## 2024-11-20 LAB
CTP QC/QA: YES
CTP QC/QA: YES
POC MOLECULAR INFLUENZA A AGN: NEGATIVE
POC MOLECULAR INFLUENZA B AGN: NEGATIVE
SARS-COV-2 RDRP RESP QL NAA+PROBE: NEGATIVE

## 2024-11-20 PROCEDURE — 1160F RVW MEDS BY RX/DR IN RCRD: CPT | Mod: ,,, | Performed by: FAMILY MEDICINE

## 2024-11-20 PROCEDURE — 87502 INFLUENZA DNA AMP PROBE: CPT | Mod: QW,,, | Performed by: FAMILY MEDICINE

## 2024-11-20 PROCEDURE — 3008F BODY MASS INDEX DOCD: CPT | Mod: ,,, | Performed by: FAMILY MEDICINE

## 2024-11-20 PROCEDURE — 87635 SARS-COV-2 COVID-19 AMP PRB: CPT | Mod: QW,,, | Performed by: FAMILY MEDICINE

## 2024-11-20 PROCEDURE — 3044F HG A1C LEVEL LT 7.0%: CPT | Mod: ,,, | Performed by: FAMILY MEDICINE

## 2024-11-20 PROCEDURE — 96372 THER/PROPH/DIAG INJ SC/IM: CPT | Mod: ,,, | Performed by: FAMILY MEDICINE

## 2024-11-20 PROCEDURE — 3075F SYST BP GE 130 - 139MM HG: CPT | Mod: ,,, | Performed by: FAMILY MEDICINE

## 2024-11-20 PROCEDURE — 99214 OFFICE O/P EST MOD 30 MIN: CPT | Mod: 25,,, | Performed by: FAMILY MEDICINE

## 2024-11-20 PROCEDURE — 1159F MED LIST DOCD IN RCRD: CPT | Mod: ,,, | Performed by: FAMILY MEDICINE

## 2024-11-20 PROCEDURE — 3078F DIAST BP <80 MM HG: CPT | Mod: ,,, | Performed by: FAMILY MEDICINE

## 2024-11-20 RX ORDER — CEFTRIAXONE 500 MG/1
500 INJECTION, POWDER, FOR SOLUTION INTRAMUSCULAR; INTRAVENOUS
Status: COMPLETED | OUTPATIENT
Start: 2024-11-20 | End: 2024-11-20

## 2024-11-20 RX ORDER — AMOXICILLIN AND CLAVULANATE POTASSIUM 875; 125 MG/1; MG/1
1 TABLET, FILM COATED ORAL 2 TIMES DAILY
Qty: 14 TABLET | Refills: 0 | Status: SHIPPED | OUTPATIENT
Start: 2024-11-20 | End: 2024-11-27

## 2024-11-20 RX ORDER — BENZONATATE 100 MG/1
100 CAPSULE ORAL 3 TIMES DAILY PRN
Qty: 20 CAPSULE | Refills: 0 | Status: SHIPPED | OUTPATIENT
Start: 2024-11-20

## 2024-11-20 RX ORDER — PREDNISONE 20 MG/1
20 TABLET ORAL DAILY
Qty: 5 TABLET | Refills: 0 | Status: SHIPPED | OUTPATIENT
Start: 2024-11-20 | End: 2024-11-25

## 2024-11-20 RX ORDER — DEXAMETHASONE SODIUM PHOSPHATE 4 MG/ML
4 INJECTION, SOLUTION INTRA-ARTICULAR; INTRALESIONAL; INTRAMUSCULAR; INTRAVENOUS; SOFT TISSUE
Status: COMPLETED | OUTPATIENT
Start: 2024-11-20 | End: 2024-11-20

## 2024-11-20 RX ADMIN — DEXAMETHASONE SODIUM PHOSPHATE 4 MG: 4 INJECTION, SOLUTION INTRA-ARTICULAR; INTRALESIONAL; INTRAMUSCULAR; INTRAVENOUS; SOFT TISSUE at 07:11

## 2024-11-20 RX ADMIN — CEFTRIAXONE 500 MG: 500 INJECTION, POWDER, FOR SOLUTION INTRAMUSCULAR; INTRAVENOUS at 07:11

## 2024-11-21 NOTE — PROGRESS NOTES
Subjective:       Patient ID: Sheri Ho is a 59 y.o. female.    Chief Complaint: Sinusitis (Pt complains of sinus congestion x 2 days.)    Sinusitis  Associated symptoms include congestion, coughing and sinus pressure. Pertinent negatives include no chills, diaphoresis, ear pain, headaches, neck pain, shortness of breath, sneezing or sore throat.     Review of Systems   Constitutional:  Negative for activity change, appetite change, chills, diaphoresis, fatigue, fever and unexpected weight change.   HENT:  Positive for nasal congestion, postnasal drip, rhinorrhea and sinus pressure/congestion. Negative for dental problem, drooling, ear discharge, ear pain, facial swelling, hearing loss, mouth sores, nosebleeds, sneezing, sore throat, tinnitus, trouble swallowing, voice change and goiter.    Eyes:  Negative for photophobia, discharge, itching and visual disturbance.   Respiratory:  Positive for cough. Negative for apnea, choking, chest tightness, shortness of breath, wheezing and stridor.    Cardiovascular:  Negative for chest pain, palpitations, leg swelling and claudication.   Gastrointestinal:  Negative for abdominal distention, abdominal pain, anal bleeding, blood in stool, change in bowel habit, constipation, diarrhea, nausea and vomiting.   Endocrine: Negative for cold intolerance, heat intolerance, polydipsia, polyphagia and polyuria.   Genitourinary:  Negative for bladder incontinence, decreased urine volume, difficulty urinating, dysuria, enuresis, flank pain, frequency, hematuria, nocturia, pelvic pain and urgency.   Musculoskeletal:  Negative for arthralgias, back pain, gait problem, joint swelling, leg pain, myalgias, neck pain, neck stiffness and joint deformity.   Integumentary:  Negative for pallor, rash, wound, breast mass and breast tenderness.   Allergic/Immunologic: Negative for environmental allergies, food allergies and immunocompromised state.   Neurological:  Negative for dizziness,  vertigo, tremors, seizures, syncope, facial asymmetry, speech difficulty, weakness, light-headedness, numbness, headaches, memory loss and coordination difficulties.   Hematological:  Negative for adenopathy. Does not bruise/bleed easily.   Psychiatric/Behavioral:  Negative for agitation, behavioral problems, confusion, decreased concentration, dysphoric mood, hallucinations, self-injury, sleep disturbance and suicidal ideas. The patient is not nervous/anxious and is not hyperactive.    Breast: Negative for mass and tenderness        Objective:      Physical Exam  Vitals reviewed.   Constitutional:       Appearance: Normal appearance.   HENT:      Head: Normocephalic and atraumatic.      Right Ear: Tympanic membrane, ear canal and external ear normal.      Left Ear: Tympanic membrane, ear canal and external ear normal.      Nose: Congestion and rhinorrhea present.      Mouth/Throat:      Mouth: Mucous membranes are moist.      Pharynx: Oropharynx is clear. Posterior oropharyngeal erythema present.   Eyes:      Extraocular Movements: Extraocular movements intact.      Conjunctiva/sclera: Conjunctivae normal.      Pupils: Pupils are equal, round, and reactive to light.   Cardiovascular:      Rate and Rhythm: Normal rate and regular rhythm.      Pulses: Normal pulses.      Heart sounds: Normal heart sounds.   Pulmonary:      Effort: Pulmonary effort is normal.      Breath sounds: Normal breath sounds.   Abdominal:      General: Bowel sounds are normal.      Palpations: Abdomen is soft.   Musculoskeletal:         General: Normal range of motion.      Cervical back: Normal range of motion and neck supple.   Skin:     General: Skin is warm and dry.   Neurological:      General: No focal deficit present.      Mental Status: She is alert. Mental status is at baseline.   Psychiatric:         Mood and Affect: Mood normal.         Behavior: Behavior normal.         Thought Content: Thought content normal.         Judgment:  Judgment normal.         Assessment:       1. Sinusitis, unspecified chronicity, unspecified location    2. Contact with or exposure to viral disease        Plan:     Sinusitis, unspecified chronicity, unspecified location  -     cefTRIAXone injection 500 mg  -     dexAMETHasone injection 4 mg  -     amoxicillin-clavulanate 875-125mg (AUGMENTIN) 875-125 mg per tablet; Take 1 tablet by mouth 2 (two) times a day. for 7 days  Dispense: 14 tablet; Refill: 0  -     benzonatate (TESSALON) 100 MG capsule; Take 1 capsule (100 mg total) by mouth 3 (three) times daily as needed for Cough.  Dispense: 20 capsule; Refill: 0  -     predniSONE (DELTASONE) 20 MG tablet; Take 1 tablet (20 mg total) by mouth once daily. for 5 days  Dispense: 5 tablet; Refill: 0    Contact with or exposure to viral disease  -     POCT Influenza A/B Molecular  -     POCT COVID-19 Rapid Screening

## 2024-12-16 ENCOUNTER — HOSPITAL ENCOUNTER (OUTPATIENT)
Dept: RADIOLOGY | Facility: HOSPITAL | Age: 59
Discharge: HOME | End: 2024-12-16
Attending: ORTHOPAEDIC SURGERY
Payer: MEDICARE

## 2024-12-16 ENCOUNTER — OFFICE VISIT (OUTPATIENT)
Dept: ORTHOPEDICS | Facility: CLINIC | Age: 59
End: 2024-12-16
Payer: MEDICARE

## 2024-12-16 VITALS — HEIGHT: 65 IN | WEIGHT: 200.5 LBS | BODY MASS INDEX: 33.41 KG/M2

## 2024-12-16 DIAGNOSIS — M25.512 BILATERAL SHOULDER PAIN, UNSPECIFIED CHRONICITY: ICD-10-CM

## 2024-12-16 DIAGNOSIS — M25.511 BILATERAL SHOULDER PAIN, UNSPECIFIED CHRONICITY: Primary | ICD-10-CM

## 2024-12-16 DIAGNOSIS — M75.52 BURSITIS OF BOTH SHOULDERS: ICD-10-CM

## 2024-12-16 DIAGNOSIS — M25.512 BILATERAL SHOULDER PAIN, UNSPECIFIED CHRONICITY: Primary | ICD-10-CM

## 2024-12-16 DIAGNOSIS — M25.511 BILATERAL SHOULDER PAIN, UNSPECIFIED CHRONICITY: ICD-10-CM

## 2024-12-16 DIAGNOSIS — M75.51 BURSITIS OF BOTH SHOULDERS: ICD-10-CM

## 2024-12-16 PROCEDURE — 99999PBSHW PR PBB SHADOW TECHNICAL ONLY FILED TO HB: Mod: PBBFAC,,,

## 2024-12-16 PROCEDURE — 99999 PR PBB SHADOW E&M-EST. PATIENT-LVL IV: CPT | Mod: PBBFAC,,, | Performed by: ORTHOPAEDIC SURGERY

## 2024-12-16 PROCEDURE — 3008F BODY MASS INDEX DOCD: CPT | Mod: CPTII,,, | Performed by: ORTHOPAEDIC SURGERY

## 2024-12-16 PROCEDURE — 20610 DRAIN/INJ JOINT/BURSA W/O US: CPT | Mod: 50,PBBFAC | Performed by: ORTHOPAEDIC SURGERY

## 2024-12-16 PROCEDURE — 3044F HG A1C LEVEL LT 7.0%: CPT | Mod: CPTII,,, | Performed by: ORTHOPAEDIC SURGERY

## 2024-12-16 PROCEDURE — 99213 OFFICE O/P EST LOW 20 MIN: CPT | Mod: S$PBB,25,, | Performed by: ORTHOPAEDIC SURGERY

## 2024-12-16 PROCEDURE — 20610 DRAIN/INJ JOINT/BURSA W/O US: CPT | Mod: PBBFAC | Performed by: ORTHOPAEDIC SURGERY

## 2024-12-16 PROCEDURE — 73030 X-RAY EXAM OF SHOULDER: CPT | Mod: 26,RT,, | Performed by: ORTHOPAEDIC SURGERY

## 2024-12-16 PROCEDURE — 73030 X-RAY EXAM OF SHOULDER: CPT | Mod: TC,RT

## 2024-12-16 PROCEDURE — 99214 OFFICE O/P EST MOD 30 MIN: CPT | Mod: PBBFAC,25 | Performed by: ORTHOPAEDIC SURGERY

## 2024-12-16 PROCEDURE — 1159F MED LIST DOCD IN RCRD: CPT | Mod: CPTII,,, | Performed by: ORTHOPAEDIC SURGERY

## 2024-12-16 RX ORDER — TRIAMCINOLONE ACETONIDE 40 MG/ML
40 INJECTION, SUSPENSION INTRA-ARTICULAR; INTRAMUSCULAR
Status: DISCONTINUED | OUTPATIENT
Start: 2024-12-16 | End: 2024-12-16 | Stop reason: HOSPADM

## 2024-12-16 RX ORDER — BUPIVACAINE HYDROCHLORIDE 2.5 MG/ML
1 INJECTION, SOLUTION EPIDURAL; INFILTRATION; INTRACAUDAL
Status: DISCONTINUED | OUTPATIENT
Start: 2024-12-16 | End: 2024-12-16 | Stop reason: HOSPADM

## 2024-12-16 RX ADMIN — BUPIVACAINE HYDROCHLORIDE 1 ML: 2.5 INJECTION, SOLUTION EPIDURAL; INFILTRATION; INTRACAUDAL at 10:12

## 2024-12-16 RX ADMIN — TRIAMCINOLONE ACETONIDE 40 MG: 40 INJECTION, SUSPENSION INTRA-ARTICULAR; INTRAMUSCULAR at 10:12

## 2024-12-16 NOTE — PROGRESS NOTES
Patient is here for right greater than left shoulder pain.  He is having pain on impingement testing bilaterally.  Tender over the anterolateral acromion.  She has healed scars on right shoulder.  X-rays of the right shoulder show no fracture subluxation there are some postsurgical changes.  We discussed treatment options.  I injected her shoulders each warm with 1 cc of Marcaine 1 cc Kenalog.  Let her use her arm as tolerates.  I will follow back up in 3 months.  She is doing strengthening exercises.

## 2024-12-16 NOTE — PROCEDURES
Large Joint Aspiration/Injection: bilateral subacromial bursa    Date/Time: 12/16/2024 10:30 AM    Performed by: Cain Treviño MD  Authorized by: Cain Treviño MD    Consent Done?:  Yes (Verbal)  Indications:  Pain    Details:  Needle Size:  22 G  Ultrasonic Guidance for needle placement?: No    Approach:  Posterior  Location:  Shoulder  Laterality:  Bilateral  Site:  Bilateral subacromial bursa  Medications (Right):  1 mL BUPivacaine (PF) 0.25% (2.5 mg/ml) 0.25 % (2.5 mg/mL); 40 mg triamcinolone acetonide 40 mg/mL  Medications (Left):  1 mL BUPivacaine (PF) 0.25% (2.5 mg/ml) 0.25 % (2.5 mg/mL); 40 mg triamcinolone acetonide 40 mg/mL  Patient tolerance:  Patient tolerated the procedure well with no immediate complications

## 2024-12-16 NOTE — PROGRESS NOTES
Radiology Interpretation        Patient Name: Sheri Ho  Date: 12/16/2024  YOB: 1965  MRN# [id number]        ORDERING DIAGNOSIS:    Encounter Diagnosis   Name Primary?    Bilateral shoulder pain, unspecified chronicity Yes      Two views right shoulder skeletally mature individual no fractures or subluxations.  There is some mild spurring lateral acromion no bony lesions impression degenerative change AC joint right shoulder no fractures                 Cain Treviño MD

## 2025-01-02 ENCOUNTER — OFFICE VISIT (OUTPATIENT)
Dept: GASTROENTEROLOGY | Facility: CLINIC | Age: 60
End: 2025-01-02
Payer: MEDICARE

## 2025-01-02 VITALS
HEIGHT: 65 IN | WEIGHT: 202.63 LBS | BODY MASS INDEX: 33.76 KG/M2 | OXYGEN SATURATION: 96 % | SYSTOLIC BLOOD PRESSURE: 151 MMHG | HEART RATE: 75 BPM | DIASTOLIC BLOOD PRESSURE: 89 MMHG

## 2025-01-02 DIAGNOSIS — K31.84 GASTROPARESIS: ICD-10-CM

## 2025-01-02 DIAGNOSIS — Z80.0 FAMILY HISTORY OF COLON CANCER: ICD-10-CM

## 2025-01-02 DIAGNOSIS — R10.30 LOWER ABDOMINAL PAIN: ICD-10-CM

## 2025-01-02 DIAGNOSIS — K76.0 FATTY LIVER: Primary | ICD-10-CM

## 2025-01-02 PROCEDURE — 99215 OFFICE O/P EST HI 40 MIN: CPT | Mod: PBBFAC

## 2025-01-02 PROCEDURE — 1160F RVW MEDS BY RX/DR IN RCRD: CPT | Mod: CPTII,,,

## 2025-01-02 PROCEDURE — 85610 PROTHROMBIN TIME: CPT

## 2025-01-02 PROCEDURE — 3008F BODY MASS INDEX DOCD: CPT | Mod: CPTII,,,

## 2025-01-02 PROCEDURE — 3079F DIAST BP 80-89 MM HG: CPT | Mod: CPTII,,,

## 2025-01-02 PROCEDURE — 99999 PR PBB SHADOW E&M-EST. PATIENT-LVL V: CPT | Mod: PBBFAC,,,

## 2025-01-02 PROCEDURE — 1159F MED LIST DOCD IN RCRD: CPT | Mod: CPTII,,,

## 2025-01-02 PROCEDURE — 99214 OFFICE O/P EST MOD 30 MIN: CPT | Mod: S$PBB,,,

## 2025-01-02 PROCEDURE — 3077F SYST BP >= 140 MM HG: CPT | Mod: CPTII,,,

## 2025-01-02 NOTE — PATIENT INSTRUCTIONS
Constipation   - I recommend starting a daily fiber supplement with Citrucel or Fibercon (can purchase at your local pharmacy)  - I recommend that you also use Miralax 17 grams daily-to-twice daily as needed to have a regular, soft BM without straining you can adjust how often you need miralax, but start with daily dosing and go from there  - I recommend drinking at least 60-80 ounces of water daily unless you have a medical condition that requires fluid restriction

## 2025-01-02 NOTE — PROGRESS NOTES
CC:  Dysphagia, decreased appetite, lower abdominal pain, history of fatty liver    HPI 59 y.o. female presents today with a complaint of dysphagia food and pills are getting stuck once again with nausea and decreased appetite.  She also states that her brother just passed away in September with a diagnosis of colorectal cancer.  He was diagnosed in 2023 March in has away in September of 2024.  Patient's most recent colonoscopy 2016 no polyps no biopsies recommended repeat 10 years however since her brother's diagnosis she has been having some lower abdominal pain.  Labs reviewed no elevated liver enzymes and no anemia.  We will repeat lab today  Interval  HPI 59 y.o. AA female presents today for six-month follow-up.  Patient state she has having an increase in nausea and heartburn.  Patient state she did vomit yesterday but denies any dysphagia.  Patient is taking Protonix 40 mg daily.  We did discuss her diet patient is eating peppermint daily and fried greasy foods daily patient does drink sodas as well.  Discussed cutting types since being out of diet we will send home instruction to what to stay away from.  We will increase her Protonix to 40 mg twice a day.  Patient denies any abdominal pain, no hematochezia or melena.  Patient continues take her Linzess and states that he is working and she is having good bowel movements.  Labs reviewed no elevated liver enzymes or no anemia.  Medical records reviewed. Additional history supplemented by nursing.     Past Medical History:   Diagnosis Date    Arthritis     Esophageal dysphagia 4/29/2021    Gastroparesis 7/29/2022    GERD (gastroesophageal reflux disease)     Mitral insufficiency     Panic attacks     SOB (shortness of breath)          Review of Systems  General ROS: negative for chills, fever or weight loss  Cardiovascular ROS: no chest pain or dyspnea on exertion  Gastrointestinal ROS: no abdominal pain, change in bowel habits, or black/ bloody  "stools    Physical Examination  BP (!) 151/89   Pulse 75   Ht 5' 5" (1.651 m)   Wt 91.9 kg (202 lb 9.6 oz)   SpO2 96%   BMI 33.71 kg/m²   General appearance: alert, cooperative, no distress  HENT: Normocephalic, atraumatic, neck symmetrical, no nasal discharge   Lungs: no labored breathing, symmetric chest wall expansion bilaterally  Heart: regular rate and rhythm without rub; no displacement of the PMI   Abdomen: soft, non-tender; bowel sounds normoactive; no organomegaly    Labs:  Lab Results   Component Value Date    WBC 3.64 (L) 10/30/2024    HGB 12.9 10/30/2024    HCT 40.1 10/30/2024    MCV 91.8 10/30/2024     10/30/2024       CMP  Sodium   Date Value Ref Range Status   10/30/2024 140 136 - 145 mmol/L Final     Potassium   Date Value Ref Range Status   10/30/2024 3.4 (L) 3.5 - 5.1 mmol/L Final     Chloride   Date Value Ref Range Status   10/30/2024 106 98 - 107 mmol/L Final     CO2   Date Value Ref Range Status   10/30/2024 27 21 - 32 mmol/L Final     Glucose   Date Value Ref Range Status   10/30/2024 140 (H) 74 - 106 mg/dL Final     BUN   Date Value Ref Range Status   10/30/2024 9 7 - 18 mg/dL Final     Creatinine   Date Value Ref Range Status   10/30/2024 0.92 0.55 - 1.02 mg/dL Final     Calcium   Date Value Ref Range Status   10/30/2024 8.8 8.5 - 10.1 mg/dL Final     Total Protein   Date Value Ref Range Status   10/30/2024 6.9 6.4 - 8.2 g/dL Final     Albumin   Date Value Ref Range Status   10/30/2024 3.4 (L) 3.5 - 5.0 g/dL Final     Bilirubin, Total   Date Value Ref Range Status   10/30/2024 0.7 >0.0 - 1.2 mg/dL Final     Alk Phos   Date Value Ref Range Status   10/30/2024 94 46 - 118 U/L Final     AST   Date Value Ref Range Status   10/30/2024 18 15 - 37 U/L Final     ALT   Date Value Ref Range Status   10/30/2024 27 13 - 56 U/L Final     Anion Gap   Date Value Ref Range Status   10/30/2024 10 7 - 16 mmol/L Final     eGFR    Date Value Ref Range Status   12/21/2021 102 >=60 " mL/min/1.73m² Final     eGFR   Date Value Ref Range Status   04/01/2022 73 >=60 mL/min/1.73m² Final       Imaging:  Ultrasound of liver fatty disease    Independently reviewed    Assessment:  Sheri Ho 58 yo AAF here with:  Dysphagia  Decreased appetite  Nausea  History of gastroparesis  Lower abdominal pain  Family history of CRC  Fatty liver    Plan:  EGD scheduled for dysphagia instructed patient that she needed to have clear liquids only the day before her procedure  Colonoscopy scheduled due to lower abdominal pain and recent family history of CRC  Ultrasound of the liver for history of fatty liver disease  Labs today CBC CMP and PT  Low FODMAP diet instructions given due to patient's history of gastroparesis  Do not lay down within 3 hours of eating.  Avoid spicy, greasy foods  Eat 6-8 small meals per day.  Exercise 150 minutes per week  Avoid raw fruits and vegetables  Small amount of protein and fiber at one time  Follow-up post colonoscopy    30 minutes of total time spent on the encounter, which includes face to face time and non-face to face time preparing to see the patient (eg, review of tests), obtaining and/or reviewing separately obtained history, documenting clinical information in the electronic or other health record, Independently interpreting results (not separately reported) and communicating results to the patient/family/caregiver, or care coordination (not separately reported).          Carla Adams, FNP [facility]

## 2025-01-09 ENCOUNTER — HOSPITAL ENCOUNTER (OUTPATIENT)
Dept: RADIOLOGY | Facility: HOSPITAL | Age: 60
Discharge: HOME | End: 2025-01-09
Payer: MEDICARE

## 2025-01-09 DIAGNOSIS — K76.0 FATTY LIVER: ICD-10-CM

## 2025-01-09 PROCEDURE — 76705 ECHO EXAM OF ABDOMEN: CPT | Mod: TC

## 2025-01-24 ENCOUNTER — TELEPHONE (OUTPATIENT)
Dept: GASTROENTEROLOGY | Facility: CLINIC | Age: 60
End: 2025-01-24
Payer: MEDICARE

## 2025-01-24 NOTE — TELEPHONE ENCOUNTER
----- Message from Danna Moses NP sent at 1/23/2025 12:22 PM CST -----  No acute sonographic abnormality.

## 2025-01-24 NOTE — TELEPHONE ENCOUNTER
Unable to reach pt at time of call  to review result - VM left - pt advised to call clinic if any questions

## 2025-01-31 ENCOUNTER — TELEPHONE (OUTPATIENT)
Dept: GASTROENTEROLOGY | Facility: CLINIC | Age: 60
End: 2025-01-31
Payer: MEDICARE

## 2025-01-31 NOTE — TELEPHONE ENCOUNTER
----- Message from Danna Moses NP sent at 1/30/2025  4:23 PM CST -----  Labs are okay no anemia or elevated liver enzymes

## 2025-03-07 ENCOUNTER — PATIENT MESSAGE (OUTPATIENT)
Dept: GASTROENTEROLOGY | Facility: CLINIC | Age: 60
End: 2025-03-07
Payer: MEDICARE

## 2025-03-11 ENCOUNTER — TELEPHONE (OUTPATIENT)
Dept: GASTROENTEROLOGY | Facility: CLINIC | Age: 60
End: 2025-03-11
Payer: MEDICARE

## 2025-03-17 ENCOUNTER — HOSPITAL ENCOUNTER (OUTPATIENT)
Dept: RADIOLOGY | Facility: HOSPITAL | Age: 60
Discharge: HOME | End: 2025-03-17
Attending: ORTHOPAEDIC SURGERY
Payer: MEDICARE

## 2025-03-17 ENCOUNTER — OFFICE VISIT (OUTPATIENT)
Dept: ORTHOPEDICS | Facility: CLINIC | Age: 60
End: 2025-03-17
Payer: MEDICARE

## 2025-03-17 DIAGNOSIS — M75.51 BURSITIS OF BOTH SHOULDERS: ICD-10-CM

## 2025-03-17 DIAGNOSIS — M25.511 BILATERAL SHOULDER PAIN, UNSPECIFIED CHRONICITY: ICD-10-CM

## 2025-03-17 DIAGNOSIS — G89.29 CHRONIC RIGHT SHOULDER PAIN: Primary | ICD-10-CM

## 2025-03-17 DIAGNOSIS — M25.512 BILATERAL SHOULDER PAIN, UNSPECIFIED CHRONICITY: ICD-10-CM

## 2025-03-17 DIAGNOSIS — M75.52 BURSITIS OF BOTH SHOULDERS: ICD-10-CM

## 2025-03-17 DIAGNOSIS — M25.511 CHRONIC RIGHT SHOULDER PAIN: Primary | ICD-10-CM

## 2025-03-17 PROCEDURE — 73030 X-RAY EXAM OF SHOULDER: CPT | Mod: TC,LT

## 2025-03-17 PROCEDURE — 99999PBSHW PR PBB SHADOW TECHNICAL ONLY FILED TO HB: Mod: PBBFAC,,,

## 2025-03-17 PROCEDURE — 20610 DRAIN/INJ JOINT/BURSA W/O US: CPT | Mod: 50,PBBFAC | Performed by: ORTHOPAEDIC SURGERY

## 2025-03-17 PROCEDURE — 73030 X-RAY EXAM OF SHOULDER: CPT | Mod: 26,LT,, | Performed by: ORTHOPAEDIC SURGERY

## 2025-03-17 PROCEDURE — 99999 PR PBB SHADOW E&M-EST. PATIENT-LVL V: CPT | Mod: PBBFAC,,, | Performed by: ORTHOPAEDIC SURGERY

## 2025-03-17 PROCEDURE — 99215 OFFICE O/P EST HI 40 MIN: CPT | Mod: PBBFAC,25 | Performed by: ORTHOPAEDIC SURGERY

## 2025-03-17 RX ORDER — TRIAMCINOLONE ACETONIDE 40 MG/ML
40 INJECTION, SUSPENSION INTRA-ARTICULAR; INTRAMUSCULAR
Status: DISCONTINUED | OUTPATIENT
Start: 2025-03-17 | End: 2025-03-17 | Stop reason: HOSPADM

## 2025-03-17 RX ORDER — BUPIVACAINE HYDROCHLORIDE 2.5 MG/ML
1 INJECTION, SOLUTION EPIDURAL; INFILTRATION; INTRACAUDAL; PERINEURAL
Status: DISCONTINUED | OUTPATIENT
Start: 2025-03-17 | End: 2025-03-17 | Stop reason: HOSPADM

## 2025-03-17 RX ADMIN — TRIAMCINOLONE ACETONIDE 40 MG: 40 INJECTION, SUSPENSION INTRA-ARTICULAR; INTRAMUSCULAR at 08:03

## 2025-03-17 RX ADMIN — BUPIVACAINE HYDROCHLORIDE 1 ML: 2.5 INJECTION, SOLUTION EPIDURAL; INFILTRATION; INTRACAUDAL; PERINEURAL at 08:03

## 2025-03-17 NOTE — PATIENT INSTRUCTIONS
MRI bilateral shoulders scheduled at [facility] on 4/04/2025 at 10:30a.m.  Follow up appointment with Dr. Treviño to get your results scheduled on 04/09/2025 at 9:30a.m

## 2025-03-17 NOTE — PROCEDURES
Large Joint Aspiration/Injection: bilateral subacromial bursa    Date/Time: 3/17/2025 8:50 AM    Performed by: Cain Treviño MD  Authorized by: Cain Treviño MD    Consent Done?:  Yes (Verbal)  Indications:  Pain    Details:  Needle Size:  22 G  Ultrasonic Guidance for needle placement?: No    Approach:  Posterior  Location:  Shoulder  Laterality:  Bilateral  Site:  Bilateral subacromial bursa  Medications (Right):  1 mL BUPivacaine (PF) 0.25% (2.5 mg/ml) 0.25 % (2.5 mg/mL); 40 mg triamcinolone acetonide 40 mg/mL  Medications (Left):  1 mL BUPivacaine (PF) 0.25% (2.5 mg/ml) 0.25 % (2.5 mg/mL); 40 mg triamcinolone acetonide 40 mg/mL  Patient tolerance:  Patient tolerated the procedure well with no immediate complications

## 2025-03-17 NOTE — PROGRESS NOTES
Patient is here for bilateral shoulder bursitis and impingement.  Her x-rays on left show she has some degenerative changes AC joint.  There were no fractures.  She has pain on impingement testing both shoulders.  Some pain and weakness on forward flexion and abduction.  We discussed treatment options.  I injected each shoulder with 1 1 cc of Marcaine 1 cc Kenalog.  Let her use her arm as tolerates.  She has been doing strengthening exercises.  I will get MRIs of both shoulders.  She is claustrophobic we will do open air MRIs.

## 2025-03-17 NOTE — PROGRESS NOTES
Radiology Interpretation        Patient Name: Sheri Ho  Date: 3/17/2025  YOB: 1965  MRN# [id number]        ORDERING DIAGNOSIS:    Encounter Diagnoses   Name Primary?    Chronic right shoulder pain Yes    Bursitis of both shoulders         Two views left shoulder skeletally mature individual there is some degenerative changes AC joint downsloping lateral acromion no fractures impression degenerative changes AC joint left shoulder               Cain Treviño MD

## 2025-03-19 ENCOUNTER — OFFICE VISIT (OUTPATIENT)
Dept: CARDIOLOGY | Facility: CLINIC | Age: 60
End: 2025-03-19
Payer: MEDICARE

## 2025-03-19 VITALS
DIASTOLIC BLOOD PRESSURE: 78 MMHG | SYSTOLIC BLOOD PRESSURE: 132 MMHG | HEART RATE: 74 BPM | HEIGHT: 65 IN | BODY MASS INDEX: 34.49 KG/M2 | WEIGHT: 207 LBS

## 2025-03-19 DIAGNOSIS — I49.3 PVC (PREMATURE VENTRICULAR CONTRACTION): Primary | ICD-10-CM

## 2025-03-19 DIAGNOSIS — I49.3 FREQUENT PVCS: ICD-10-CM

## 2025-03-19 DIAGNOSIS — R00.2 PALPITATIONS: ICD-10-CM

## 2025-03-19 DIAGNOSIS — I10 ESSENTIAL HYPERTENSION, BENIGN: Chronic | ICD-10-CM

## 2025-03-19 PROCEDURE — 99215 OFFICE O/P EST HI 40 MIN: CPT | Mod: PBBFAC | Performed by: NURSE PRACTITIONER

## 2025-03-19 PROCEDURE — 3075F SYST BP GE 130 - 139MM HG: CPT | Mod: CPTII,,, | Performed by: NURSE PRACTITIONER

## 2025-03-19 PROCEDURE — 99214 OFFICE O/P EST MOD 30 MIN: CPT | Mod: S$PBB,,, | Performed by: NURSE PRACTITIONER

## 2025-03-19 PROCEDURE — 99999 PR PBB SHADOW E&M-EST. PATIENT-LVL V: CPT | Mod: PBBFAC,,, | Performed by: NURSE PRACTITIONER

## 2025-03-19 PROCEDURE — 3008F BODY MASS INDEX DOCD: CPT | Mod: CPTII,,, | Performed by: NURSE PRACTITIONER

## 2025-03-19 PROCEDURE — 1159F MED LIST DOCD IN RCRD: CPT | Mod: CPTII,,, | Performed by: NURSE PRACTITIONER

## 2025-03-19 PROCEDURE — 3078F DIAST BP <80 MM HG: CPT | Mod: CPTII,,, | Performed by: NURSE PRACTITIONER

## 2025-03-19 RX ORDER — METOPROLOL SUCCINATE 25 MG/1
25 TABLET, EXTENDED RELEASE ORAL DAILY
Qty: 90 TABLET | Refills: 3 | Status: SHIPPED | OUTPATIENT
Start: 2025-03-19 | End: 2026-03-19

## 2025-03-19 NOTE — PROGRESS NOTES
PCP: Ayleen Huertas FNP    Referring Provider:     Subjective:   Sheri Ho is a 59 y.o. female with hx of palpitations, anxiety, asthma, IBS, GERD who presents for follow up.    Pt has chronic, stable SOB on exertion which she attributes to her asthma. She denies any other cardiac symptoms. EKG today shows NSR with frequent PVCs. Echo and NM stress test 06/2024 were normal.         Fhx: Father: MI, CAD, HTN, Mother: HTN, Sister: HTN  Shx: Never smoker, no ETOH or illicit drug use    EKG   Results for orders placed or performed in visit on 05/01/24   EKG 12-lead    Collection Time: 05/01/24  1:24 PM   Result Value Ref Range    QRS Duration 74 ms    [facility] QTC Calculation 424 ms    Narrative    Test Reason : I10,    Vent. Rate : 065 BPM     Atrial Rate : 065 BPM     P-R Int : 136 ms          QRS Dur : 074 ms      QT Int : 408 ms       P-R-T Axes : 071 063 067 degrees     QTc Int : 424 ms    Sinus rhythm with marked sinus arrythmia with occasional Premature  ventricular complexes  Otherwise normal ECG  When compared with ECG of 30-AUG-2022 10:02,  Premature ventricular complexes are now Present  Questionable change in QRS duration  Borderline criteria for Lateral infarct are no longer Present  Confirmed by Benito Jimenez DO ([id number]) on 5/9/2024 1:13:00 PM    Referred By: AYLEEN HUERTAS           Confirmed By:Benito Jimenez DO     ECHO Results for orders placed during the hospital encounter of 06/03/24    Echo    Interpretation Summary    Left Ventricle: The left ventricle is normal in size. Normal wall thickness. There is normal systolic function with a visually estimated ejection fraction of 55 - 70%. There is normal diastolic function.    Right Ventricle: Normal right ventricular cavity size. Systolic function is normal.    Aortic Valve: The aortic valve is a trileaflet valve.    Mitral Valve: There is mild regurgitation.    Tricuspid Valve: There is mild regurgitation.    [facility] No results found for this or any previous  "visit.        Lab Results   Component Value Date     01/02/2025    K 4.3 01/02/2025     01/02/2025    CO2 28 01/02/2025    BUN 12 01/02/2025    CREATININE 0.87 01/02/2025    CALCIUM 9.0 01/02/2025    ANIONGAP 10 01/02/2025    ESTGFRAFRICA 102 12/21/2021    EGFRNONAA 73 04/01/2022       Lab Results   Component Value Date    CHOL 166 04/06/2023    CHOL 175 04/01/2022    CHOL 167 08/18/2021     Lab Results   Component Value Date    HDL 56 04/06/2023    HDL 61 (H) 04/01/2022    HDL 58 08/18/2021     Lab Results   Component Value Date    LDLCALC 99 04/06/2023    LDLCALC 97 04/01/2022    LDLCALC 88 08/18/2021     Lab Results   Component Value Date    TRIG 54 04/06/2023    TRIG 87 04/01/2022    TRIG 104 08/18/2021     Lab Results   Component Value Date    CHOLHDL 3.0 04/06/2023    CHOLHDL 2.9 04/01/2022    CHOLHDL 2.9 08/18/2021       Lab Results   Component Value Date    WBC 7.59 01/02/2025    HGB 14.0 01/02/2025    HCT 44.4 01/02/2025    MCV 93.9 01/02/2025     01/02/2025         Current Medications[1]    ROS      Objective:   /78 (BP Location: Left arm, Patient Position: Sitting)   Pulse 74   Ht 5' 5" (1.651 m)   Wt 93.9 kg (207 lb)   BMI 34.45 kg/m²     Physical Exam  Constitutional:       General: She is not in acute distress.     Appearance: Normal appearance. She is not ill-appearing.   Cardiovascular:      Rate and Rhythm: Normal rate and regular rhythm.      Heart sounds: Normal heart sounds.   Pulmonary:      Effort: Pulmonary effort is normal.      Breath sounds: Normal breath sounds.   Musculoskeletal:      Cervical back: Neck supple. No rigidity.      Right lower leg: No edema.      Left lower leg: No edema.   Skin:     General: Skin is warm and dry.   Neurological:      Mental Status: She is alert.           Assessment:     1. PVC (premature ventricular contraction)  metoprolol succinate (TOPROL-XL) 25 MG 24 hr tablet      2. Essential hypertension, benign        3. Frequent PVCs  "       4. Palpitations              Plan:   Essential hypertension, benign  Chronic  /78  Starting Toprol XL 25mg for frequent PVCs  Continue hydralazine and verapamil    Frequent PVCs  Start Toprol XL 25mg daily  Continue verapamil  Obtain lab results from Dr. Lanier's office - pt states she just had a thyroid panel there    Palpitations  Resolved in verapamil, however EKG shows frequent PVCs  Start Toprol XL 25mg daily  Obtain lab results from Dr. Lanier's office - pt states she just had a thyroid panel there      Follow up with me in 3 months             [1]   Current Outpatient Medications:     albuterol (PROVENTIL/VENTOLIN HFA) 90 mcg/actuation inhaler, Inhale 2 puffs into the lungs every 4 (four) hours as needed for Wheezing., Disp: 54 g, Rfl: 3    albuterol sulfate 90 mcg/actuation aebs, Inhale 180 mcg into the lungs every 6 (six) hours as needed., Disp: , Rfl:     aspirin (ECOTRIN) 81 MG EC tablet, Take 81 mg by mouth once daily., Disp: , Rfl:     budesonide-formoterol 160-4.5 mcg (SYMBICORT) 160-4.5 mcg/actuation HFAA, Inhale 2 puffs into the lungs 2 (two) times a day., Disp: 30.6 g, Rfl: 3    cyclobenzaprine (FLEXERIL) 10 MG tablet, Take 10 mg by mouth 3 (three) times daily., Disp: , Rfl:     EScitalopram oxalate (LEXAPRO) 20 MG tablet, Take 1 tablet (20 mg total) by mouth once daily., Disp: 90 tablet, Rfl: 3    hydrALAZINE (APRESOLINE) 10 MG tablet, TAKE 1 TABLET BY MOUTH TWICE A DAY, Disp: 180 tablet, Rfl: 1    HYDROcodone-acetaminophen (NORCO)  mg per tablet, Take 1 tablet by mouth every 8 (eight) hours as needed., Disp: , Rfl:     hydrOXYzine HCL (ATARAX) 10 MG Tab, 1-2 tabs Q6hrs prn for nerves, itching, insomnia, or nausea, Disp: 120 tablet, Rfl: 5    ibuprofen (ADVIL,MOTRIN) 800 MG tablet, Take 1 tablet (800 mg total) by mouth 2 (two) times a day., Disp: 180 tablet, Rfl: 1    LINZESS 145 mcg Cap capsule, TAKE 1 CAPSULE DAILY BEFORE BREAKFAST., Disp: 90 capsule, Rfl: 3    multivitamin  (MULTIPLE VITAMINS ORAL), Take 1 tablet by mouth once daily. OTC: w/ Iron & Folic Acid, Disp: , Rfl:     pantoprazole (PROTONIX) 40 MG tablet, Take 1 tablet (40 mg total) by mouth 2 (two) times daily., Disp: 180 tablet, Rfl: 3    verapamiL (VERELAN) 120 MG C24P, Take 1 capsule (120 mg total) by mouth once daily., Disp: 30 capsule, Rfl: 11    benzonatate (TESSALON) 100 MG capsule, Take 1 capsule (100 mg total) by mouth 3 (three) times daily as needed for Cough. (Patient not taking: Reported on 3/19/2025), Disp: 20 capsule, Rfl: 0    buPROPion (WELLBUTRIN SR) 100 MG TBSR 12 hr tablet, Take 100 mg by mouth once daily. (Patient not taking: Reported on 3/19/2025), Disp: , Rfl:     cetirizine (ZYRTEC) 10 MG tablet, Take 1 tablet (10 mg total) by mouth once daily. (Patient not taking: Reported on 3/19/2025), Disp: 90 tablet, Rfl: 3    metoprolol succinate (TOPROL-XL) 25 MG 24 hr tablet, Take 1 tablet (25 mg total) by mouth once daily., Disp: 90 tablet, Rfl: 3  No current facility-administered medications for this visit.    Facility-Administered Medications Ordered in Other Visits:     0.9%  NaCl infusion, , Intravenous, Continuous, Cain Treviño MD, Last Rate: 500 mL/hr at 09/02/22 1002, New Bag at 07/19/23 9364

## 2025-03-19 NOTE — ASSESSMENT & PLAN NOTE
Resolved in verapamil, however EKG shows frequent PVCs  Start Toprol XL 25mg daily  Obtain lab results from Dr. Lanier's office - pt states she just had a thyroid panel there

## 2025-03-19 NOTE — ASSESSMENT & PLAN NOTE
Start Toprol XL 25mg daily  Continue verapamil  Obtain lab results from Dr. Lanier's office - pt states she just had a thyroid panel there

## 2025-03-25 ENCOUNTER — ANESTHESIA (OUTPATIENT)
Dept: GASTROENTEROLOGY | Facility: HOSPITAL | Age: 60
End: 2025-03-25
Payer: MEDICARE

## 2025-03-25 ENCOUNTER — HOSPITAL ENCOUNTER (OUTPATIENT)
Dept: GASTROENTEROLOGY | Facility: HOSPITAL | Age: 60
Discharge: HOME | End: 2025-03-25
Admitting: INTERNAL MEDICINE
Payer: MEDICARE

## 2025-03-25 ENCOUNTER — ANESTHESIA EVENT (OUTPATIENT)
Dept: GASTROENTEROLOGY | Facility: HOSPITAL | Age: 60
End: 2025-03-25
Payer: MEDICARE

## 2025-03-25 VITALS
TEMPERATURE: 98 F | RESPIRATION RATE: 15 BRPM | BODY MASS INDEX: 34.32 KG/M2 | OXYGEN SATURATION: 97 % | HEIGHT: 65 IN | HEART RATE: 69 BPM | SYSTOLIC BLOOD PRESSURE: 131 MMHG | DIASTOLIC BLOOD PRESSURE: 81 MMHG | WEIGHT: 206 LBS

## 2025-03-25 DIAGNOSIS — Z12.11 COLON CANCER SCREENING: Primary | ICD-10-CM

## 2025-03-25 DIAGNOSIS — K31.84 GASTROPARESIS: ICD-10-CM

## 2025-03-25 LAB — GLUCOSE SERPL-MCNC: 123 MG/DL (ref 70–105)

## 2025-03-25 PROCEDURE — 88305 TISSUE EXAM BY PATHOLOGIST: CPT | Mod: 26,,, | Performed by: PATHOLOGY

## 2025-03-25 PROCEDURE — 88305 TISSUE EXAM BY PATHOLOGIST: CPT | Mod: TC,SUR | Performed by: INTERNAL MEDICINE

## 2025-03-25 PROCEDURE — 27000284 HC CANNULA NASAL: Performed by: NURSE ANESTHETIST, CERTIFIED REGISTERED

## 2025-03-25 PROCEDURE — D9220A PRA ANESTHESIA: Mod: ,,, | Performed by: NURSE ANESTHETIST, CERTIFIED REGISTERED

## 2025-03-25 PROCEDURE — 37000008 HC ANESTHESIA 1ST 15 MINUTES

## 2025-03-25 PROCEDURE — 63600175 PHARM REV CODE 636 W HCPCS: Performed by: NURSE ANESTHETIST, CERTIFIED REGISTERED

## 2025-03-25 PROCEDURE — 27201423 OPTIME MED/SURG SUP & DEVICES STERILE SUPPLY

## 2025-03-25 PROCEDURE — 37000009 HC ANESTHESIA EA ADD 15 MINS

## 2025-03-25 RX ORDER — LIDOCAINE HYDROCHLORIDE 20 MG/ML
INJECTION, SOLUTION EPIDURAL; INFILTRATION; INTRACAUDAL; PERINEURAL
Status: DISCONTINUED | OUTPATIENT
Start: 2025-03-25 | End: 2025-03-25

## 2025-03-25 RX ORDER — SODIUM CHLORIDE, SODIUM LACTATE, POTASSIUM CHLORIDE, CALCIUM CHLORIDE 600; 310; 30; 20 MG/100ML; MG/100ML; MG/100ML; MG/100ML
INJECTION, SOLUTION INTRAVENOUS CONTINUOUS
Status: DISCONTINUED | OUTPATIENT
Start: 2025-03-25 | End: 2025-03-26 | Stop reason: HOSPADM

## 2025-03-25 RX ORDER — POLYETHYLENE GLYCOL 3350, SODIUM SULFATE ANHYDROUS, SODIUM BICARBONATE, SODIUM CHLORIDE, POTASSIUM CHLORIDE 236; 22.74; 6.74; 5.86; 2.97 G/4L; G/4L; G/4L; G/4L; G/4L
4 POWDER, FOR SOLUTION ORAL ONCE
Qty: 4000 ML | Refills: 0 | Status: SHIPPED | OUTPATIENT
Start: 2025-03-25 | End: 2025-03-25

## 2025-03-25 RX ORDER — FENTANYL CITRATE 50 UG/ML
INJECTION, SOLUTION INTRAMUSCULAR; INTRAVENOUS
Status: DISCONTINUED | OUTPATIENT
Start: 2025-03-25 | End: 2025-03-25

## 2025-03-25 RX ORDER — GLYCOPYRROLATE 0.2 MG/ML
INJECTION INTRAMUSCULAR; INTRAVENOUS
Status: DISCONTINUED | OUTPATIENT
Start: 2025-03-25 | End: 2025-03-25

## 2025-03-25 RX ORDER — SODIUM CHLORIDE 0.9 % (FLUSH) 0.9 %
10 SYRINGE (ML) INJECTION EVERY 6 HOURS PRN
Status: DISCONTINUED | OUTPATIENT
Start: 2025-03-25 | End: 2025-03-26 | Stop reason: HOSPADM

## 2025-03-25 RX ORDER — PROPOFOL 10 MG/ML
VIAL (ML) INTRAVENOUS
Status: DISCONTINUED | OUTPATIENT
Start: 2025-03-25 | End: 2025-03-25

## 2025-03-25 RX ADMIN — PROPOFOL 50 MG: 10 INJECTION, EMULSION INTRAVENOUS at 11:03

## 2025-03-25 RX ADMIN — FENTANYL CITRATE 100 MCG: 50 INJECTION INTRAMUSCULAR; INTRAVENOUS at 11:03

## 2025-03-25 RX ADMIN — LIDOCAINE HYDROCHLORIDE 100 MG: 20 INJECTION, SOLUTION EPIDURAL; INFILTRATION; INTRACAUDAL; PERINEURAL at 11:03

## 2025-03-25 RX ADMIN — GLYCOPYRROLATE 0.2 MG: 0.2 INJECTION INTRAMUSCULAR; INTRAVENOUS at 11:03

## 2025-03-25 NOTE — DISCHARGE INSTRUCTIONS
Procedure Date  3/25/25     Impression  Overall Impression:   The esophagus appeared normal.  Performed forceps biopsies in the middle third of the esophagus to rule out eosinophilic esophagitis  Dilated in the esophagus with Savary-Lenin dilator  Mild erythematous mucosa in the antrum; performed cold forceps biopsy to rule out H. pylori  The duodenal bulb and 2nd part of the duodenum appeared normal.        Recommendation  - Empiric dilation performed for symptoms of dysphagia  - Only mild gastritis seen on today's exam  - Trial of pepcid as needed in addition to PPI for reflux and bloating  - Discharge patient to home  - Advance diet as tolerated  - Continue present medications  - Await pathology results  - Patient has a contact number available for emergencies. The signs and symptoms of potential delayed complications were discussed with the patient. Return to normal activities tomorrow. Written discharge instructions were provided to the patient.      DO NOT DRIVE FOR 24 HOURS OR OPERATE HEAVY MACHINERY.   DO NOT SIGN LEGAL DOCUMENTS.  DRINK PLENTY OF FLUIDS. RESUME DIET.

## 2025-03-25 NOTE — H&P
History & Physical - Short Stay  Gastroenterology      SUBJECTIVE:     Procedure: EGD    Chief Complaint/Indication for Procedure: Abdominal Pain and Dysphagia    (Not in a hospital admission)      Review of patient's allergies indicates:   Allergen Reactions    Codeine Nausea And Vomiting and Swelling        Past Medical History:   Diagnosis Date    Arthritis     Esophageal dysphagia 4/29/2021    Gastroparesis 7/29/2022    GERD (gastroesophageal reflux disease)     Mitral insufficiency     Panic attacks     SOB (shortness of breath)      Past Surgical History:   Procedure Laterality Date    ARTHROSCOPIC REPAIR OF ROTATOR CUFF OF SHOULDER Right 11/30/2021    Procedure: REPAIR, ROTATOR CUFF, ARTHROSCOPIC;  Surgeon: Cain Treviño MD;  Location: [facility] OR;  Service: Orthopedics;  Laterality: Right;    BLOCK, NERVE, SHOULDER Left 7/19/2023    Procedure: BLOCK, NERVE, SHOULDER;  Surgeon: Bernard Ellington MD;  Location: [facility];  Service: Pain Management;  Laterality: Left;  Left shoulder NB    BLOCK, NERVE, SHOULDER Right 4/10/2024    Procedure: BLOCK, NERVE, SHOULDER;  Surgeon: Bernard Ellington MD;  Location: [facility];  Service: Pain Management;  Laterality: Right;  Right shoulder NB    CHOLECYSTECTOMY      COLONOSCOPY      CYSTOSCOPY      ESOPHAGOGASTRODUODENOSCOPY      HYSTERECTOMY      LIPOMA RESECTION N/A 09/02/2022    Procedure: EXCISION, BACK LIPOMA;  Surgeon: Zane Crowe DO;  Location: [facility] OR;  Service: General;  Laterality: N/A;    OOPHORECTOMY      ROTATOR CUFF REPAIR      SHOULDER ARTHROSCOPY      SHOULDER ARTHROSCOPY Right 11/30/2021    Procedure: ARTHROSCOPY, SHOULDER;  Surgeon: Cain Treviño MD;  Location: [facility] OR;  Service: Orthopedics;  Laterality: Right;     Family History   Problem Relation Name Age of Onset    Hypertension Mother      Diabetes Father      Hypertension Father      Heart disease Father      Heart attack Father       Hypertension Sister      Hypertension Maternal Grandmother      Breast cancer Cousin       Social History[1]      OBJECTIVE:     Physical Exam:                                                       General appearance: alert, cooperative, no distress, comfortable appearing  HENT: Normocephalic, atraumatic, neck symmetrical  Eyes: conjunctivae clear  Lungs: No labored breathing  Abd: Soft, non-tender    ASSESSMENT/PLAN:     Assessment: Abdominal Pain and Dysphagia    Plan: EGD         [1]   Social History  Tobacco Use    Smoking status: Never    Smokeless tobacco: Never   Substance Use Topics    Alcohol use: Never    Drug use: Never

## 2025-03-25 NOTE — ANESTHESIA PREPROCEDURE EVALUATION
03/25/2025  Sheri Ho is a 59 y.o., female.    Social History     Socioeconomic History    Marital status:    Tobacco Use    Smoking status: Never    Smokeless tobacco: Never   Substance and Sexual Activity    Alcohol use: Never    Drug use: Never     Social Drivers of Health     Financial Resource Strain: Low Risk  (2/20/2025)    Overall Financial Resource Strain (CARDIA)     Difficulty of Paying Living Expenses: Not very hard   Food Insecurity: Food Insecurity Present (2/20/2025)    Hunger Vital Sign     Worried About Running Out of Food in the Last Year: Sometimes true     Ran Out of Food in the Last Year: Never true   Transportation Needs: No Transportation Needs (2/20/2025)    PRAPARE - Transportation     Lack of Transportation (Medical): No     Lack of Transportation (Non-Medical): No   Physical Activity: Insufficiently Active (2/20/2025)    Exercise Vital Sign     Days of Exercise per Week: 2 days     Minutes of Exercise per Session: 20 min   Stress: Stress Concern Present (2/20/2025)    [employer] - Occupational Stress Questionnaire     Feeling of Stress : Very much   Housing Stability: High Risk (2/20/2025)    Housing Stability Vital Sign     Unable to Pay for Housing in the Last Year: Yes     Number of Times Moved in the Last Year: 0     Homeless in the Last Year: No      Pre-op Assessment    I have reviewed the Patient Summary Reports.     I have reviewed the Nursing Notes. I have reviewed the NPO Status.   I have reviewed the Medications.     Review of Systems  Anesthesia Hx:  No problems with previous Anesthesia                Social:  Non-Smoker       Cardiovascular:     Hypertension                                          Pulmonary:    Asthma  Shortness of breath                  Hepatic/GI:     GERD Liver Disease,               Psych:  Psychiatric  History                Past Medical History:   Diagnosis Date    Arthritis     Esophageal dysphagia 4/29/2021    Gastroparesis 7/29/2022    GERD (gastroesophageal reflux disease)     Mitral insufficiency     Panic attacks     SOB (shortness of breath)       Past Surgical History:   Procedure Laterality Date    ARTHROSCOPIC REPAIR OF ROTATOR CUFF OF SHOULDER Right 11/30/2021    Procedure: REPAIR, ROTATOR CUFF, ARTHROSCOPIC;  Surgeon: Cain Treviño MD;  Location: [facility] OR;  Service: Orthopedics;  Laterality: Right;    BLOCK, NERVE, SHOULDER Left 7/19/2023    Procedure: BLOCK, NERVE, SHOULDER;  Surgeon: Bernard Ellington MD;  [facility]: [facility];  Service: Pain Management;  Laterality: Left;  Left shoulder NB    BLOCK, NERVE, SHOULDER Right 4/10/2024    Procedure: BLOCK, NERVE, SHOULDER;  Surgeon: Bernard Ellington MD;  Location: [facility];  Service: Pain Management;  Laterality: Right;  Right shoulder NB    CHOLECYSTECTOMY      COLONOSCOPY      CYSTOSCOPY      ESOPHAGOGASTRODUODENOSCOPY      HYSTERECTOMY      LIPOMA RESECTION N/A 09/02/2022    Procedure: EXCISION, BACK LIPOMA;  Surgeon: Zane Crowe DO;  Location: [facility] OR;  Service: General;  Laterality: N/A;    OOPHORECTOMY      ROTATOR CUFF REPAIR      SHOULDER ARTHROSCOPY      SHOULDER ARTHROSCOPY Right 11/30/2021    Procedure: ARTHROSCOPY, SHOULDER;  Surgeon: Cain Treviño MD;  Location: [facility] OR;  Service: Orthopedics;  Laterality: Right;        Physical Exam  General: Well nourished, Cooperative, Alert and Oriented    Airway:  Mallampati: II     Chest/Lungs:  Clear to auscultation    Heart:  Rate: Normal        Anesthesia Plan  Type of Anesthesia, risks & benefits discussed:    Anesthesia Type: Gen Natural Airway, MAC  Intra-op Monitoring Plan: Standard ASA Monitors  Post Op Pain Control Plan: multimodal analgesia and IV/PO Opioids PRN  Induction:  IV  Informed Consent: Informed consent signed with the  Patient and all parties understand the risks and agree with anesthesia plan.  All questions answered. Patient consented to blood products? Yes  ASA Score: 3  Day of Surgery Review of History & Physical: I have interviewed and examined the patient. I have reviewed the patient's H&P dated: There are no significant changes.     Ready For Surgery From Anesthesia Perspective.     .

## 2025-03-27 DIAGNOSIS — I10 HYPERTENSION, UNSPECIFIED TYPE: ICD-10-CM

## 2025-03-27 RX ORDER — HYDRALAZINE HYDROCHLORIDE 10 MG/1
10 TABLET, FILM COATED ORAL 2 TIMES DAILY
Qty: 180 TABLET | Refills: 1 | Status: SHIPPED | OUTPATIENT
Start: 2025-03-27

## 2025-03-27 NOTE — ANESTHESIA POSTPROCEDURE EVALUATION
Anesthesia Post Evaluation    Patient: Sheri Ho    Procedure(s) Performed: * No procedures listed *    Final Anesthesia Type: general      Patient location during evaluation: GI PACU  Patient participation: Yes- Able to Participate  Level of consciousness: responds to stimulation  Post-procedure vital signs: reviewed and stable  Pain management: adequate  Airway patency: patent  LIBIA mitigation strategies: Multimodal analgesia  PONV status at discharge: No PONV  Anesthetic complications: no      Cardiovascular status: hemodynamically stable  Respiratory status: unassisted, spontaneous ventilation and room air  Hydration status: euvolemic  Follow-up not needed.  Comments: The pt was not arousable even with painful stimulation during the procedure.   Refer to nursing note for pain/courtney score upon discharge from recovery.               Vitals Value Taken Time   /77 03/26/25 09:02   Temp 97f 03/27/25 07:31   Pulse 73 03/26/25 09:43   Resp 14 03/26/25 09:39   SpO2 100 % 03/26/25 09:43   Vitals shown include unfiled device data.      Event Time   Out of Recovery 12:11:00         Pain/Courtney Score: No data recorded

## 2025-04-03 ENCOUNTER — RESULTS FOLLOW-UP (OUTPATIENT)
Dept: GASTROENTEROLOGY | Facility: HOSPITAL | Age: 60
End: 2025-04-03

## 2025-04-04 ENCOUNTER — TELEPHONE (OUTPATIENT)
Dept: GASTROENTEROLOGY | Facility: CLINIC | Age: 60
End: 2025-04-04
Payer: MEDICARE

## 2025-04-04 NOTE — TELEPHONE ENCOUNTER
Left detailed Voicemail in regards to results  ----- Message from Ulices Ann MD sent at 4/3/2025  2:42 PM CDT -----  Please notify patient that the biopsy results from recent EGD show gastritis (inflammation in the stomach). I recommend avoiding NSAIDs (advil, ibuprofen, aleve, etc). Continue current medications.   Follow up in GI clinic as scheduled or as needed.  ----- Message -----  From: Interface, Lab In [facility]  Sent: 3/25/2025  11:03 AM CDT  To: Ulices Ann MD

## 2025-04-08 ENCOUNTER — HOSPITAL ENCOUNTER (OUTPATIENT)
Dept: GASTROENTEROLOGY | Facility: HOSPITAL | Age: 60
Discharge: HOME | End: 2025-04-08
Admitting: INTERNAL MEDICINE
Payer: MEDICARE

## 2025-04-08 DIAGNOSIS — Z80.0 FAMILY HISTORY OF COLON CANCER: ICD-10-CM

## 2025-04-08 DIAGNOSIS — R10.30 LOWER ABDOMINAL PAIN: ICD-10-CM

## 2025-04-08 RX ORDER — SODIUM CHLORIDE 0.9 % (FLUSH) 0.9 %
10 SYRINGE (ML) INJECTION EVERY 6 HOURS PRN
Status: DISCONTINUED | OUTPATIENT
Start: 2025-04-08 | End: 2025-04-09 | Stop reason: HOSPADM

## 2025-04-08 RX ORDER — SODIUM CHLORIDE, SODIUM LACTATE, POTASSIUM CHLORIDE, CALCIUM CHLORIDE 600; 310; 30; 20 MG/100ML; MG/100ML; MG/100ML; MG/100ML
INJECTION, SOLUTION INTRAVENOUS CONTINUOUS
Status: DISCONTINUED | OUTPATIENT
Start: 2025-04-08 | End: 2025-04-09 | Stop reason: HOSPADM

## 2025-04-08 NOTE — H&P
History & Physical - Short Stay  Gastroenterology      SUBJECTIVE:     Procedure: Colonoscopy    Chief Complaint/Indication for Procedure: Abdominal Pain    (Not in a hospital admission)      Review of patient's allergies indicates:   Allergen Reactions    Codeine Nausea And Vomiting and Swelling        Past Medical History:   Diagnosis Date    Arthritis     Esophageal dysphagia 4/29/2021    Gastroparesis 7/29/2022    GERD (gastroesophageal reflux disease)     Mitral insufficiency     Panic attacks     SOB (shortness of breath)      Past Surgical History:   Procedure Laterality Date    ARTHROSCOPIC REPAIR OF ROTATOR CUFF OF SHOULDER Right 11/30/2021    Procedure: REPAIR, ROTATOR CUFF, ARTHROSCOPIC;  Surgeon: Cain Treviño MD;  Location: [facility] OR;  Service: Orthopedics;  Laterality: Right;    BLOCK, NERVE, SHOULDER Left 7/19/2023    Procedure: BLOCK, NERVE, SHOULDER;  Surgeon: Bernard Ellington MD;  Location: [facility];  Service: Pain Management;  Laterality: Left;  Left shoulder NB    BLOCK, NERVE, SHOULDER Right 4/10/2024    Procedure: BLOCK, NERVE, SHOULDER;  Surgeon: Bernard Ellington MD;  Location: [facility];  Service: Pain Management;  Laterality: Right;  Right shoulder NB    CHOLECYSTECTOMY      COLONOSCOPY      CYSTOSCOPY      ESOPHAGOGASTRODUODENOSCOPY      HYSTERECTOMY      LIPOMA RESECTION N/A 09/02/2022    Procedure: EXCISION, BACK LIPOMA;  Surgeon: Zane Crowe DO;  Location: [facility] OR;  Service: General;  Laterality: N/A;    OOPHORECTOMY      ROTATOR CUFF REPAIR      SHOULDER ARTHROSCOPY      SHOULDER ARTHROSCOPY Right 11/30/2021    Procedure: ARTHROSCOPY, SHOULDER;  Surgeon: Cain Treviño MD;  Location: [facility] OR;  Service: Orthopedics;  Laterality: Right;     Family History   Problem Relation Name Age of Onset    Hypertension Mother      Diabetes Father      Hypertension Father      Heart disease Father      Heart attack Father      Hypertension  Sister      Hypertension Maternal Grandmother      Breast cancer Cousin       Social History[1]      OBJECTIVE:     Physical Exam:                                                       General appearance: alert, cooperative, no distress, comfortable appearing  HENT: Normocephalic, atraumatic, neck symmetrical  Eyes: conjunctivae clear  Lungs: No labored breathing  Abd: Soft, non-tender    ASSESSMENT/PLAN:     Assessment: Abdominal Pain    Plan: Colonoscopy         [1]   Social History  Tobacco Use    Smoking status: Never    Smokeless tobacco: Never   Substance Use Topics    Alcohol use: Never    Drug use: Never

## 2025-04-09 ENCOUNTER — ANESTHESIA (OUTPATIENT)
Dept: GASTROENTEROLOGY | Facility: HOSPITAL | Age: 60
End: 2025-04-09
Payer: MEDICARE

## 2025-04-09 ENCOUNTER — ANESTHESIA EVENT (OUTPATIENT)
Dept: GASTROENTEROLOGY | Facility: HOSPITAL | Age: 60
End: 2025-04-09
Payer: MEDICARE

## 2025-04-09 ENCOUNTER — HOSPITAL ENCOUNTER (OUTPATIENT)
Dept: GASTROENTEROLOGY | Facility: HOSPITAL | Age: 60
Discharge: HOME | End: 2025-04-09
Admitting: INTERNAL MEDICINE
Payer: MEDICARE

## 2025-04-09 VITALS
TEMPERATURE: 97 F | OXYGEN SATURATION: 98 % | RESPIRATION RATE: 18 BRPM | HEART RATE: 75 BPM | BODY MASS INDEX: 34.32 KG/M2 | DIASTOLIC BLOOD PRESSURE: 63 MMHG | SYSTOLIC BLOOD PRESSURE: 129 MMHG | HEIGHT: 65 IN | WEIGHT: 206 LBS

## 2025-04-09 DIAGNOSIS — Z80.0 FAMILY HISTORY OF COLON CANCER: ICD-10-CM

## 2025-04-09 DIAGNOSIS — R10.30 LOWER ABDOMINAL PAIN: ICD-10-CM

## 2025-04-09 PROCEDURE — 25000003 PHARM REV CODE 250: Performed by: ORTHOPAEDIC SURGERY

## 2025-04-09 PROCEDURE — 88305 TISSUE EXAM BY PATHOLOGIST: CPT | Mod: TC,SUR | Performed by: INTERNAL MEDICINE

## 2025-04-09 PROCEDURE — 27201423 OPTIME MED/SURG SUP & DEVICES STERILE SUPPLY

## 2025-04-09 PROCEDURE — 37000008 HC ANESTHESIA 1ST 15 MINUTES

## 2025-04-09 PROCEDURE — 27000284 HC CANNULA NASAL: Performed by: NURSE ANESTHETIST, CERTIFIED REGISTERED

## 2025-04-09 PROCEDURE — 63600175 PHARM REV CODE 636 W HCPCS: Performed by: NURSE ANESTHETIST, CERTIFIED REGISTERED

## 2025-04-09 PROCEDURE — 37000009 HC ANESTHESIA EA ADD 15 MINS

## 2025-04-09 PROCEDURE — 88305 TISSUE EXAM BY PATHOLOGIST: CPT | Mod: 26,,, | Performed by: PATHOLOGY

## 2025-04-09 RX ORDER — SODIUM CHLORIDE 0.9 % (FLUSH) 0.9 %
10 SYRINGE (ML) INJECTION EVERY 6 HOURS PRN
Status: DISCONTINUED | OUTPATIENT
Start: 2025-04-09 | End: 2025-04-10 | Stop reason: HOSPADM

## 2025-04-09 RX ORDER — PROPOFOL 10 MG/ML
VIAL (ML) INTRAVENOUS
Status: DISCONTINUED | OUTPATIENT
Start: 2025-04-09 | End: 2025-04-09

## 2025-04-09 RX ORDER — SODIUM CHLORIDE, SODIUM LACTATE, POTASSIUM CHLORIDE, CALCIUM CHLORIDE 600; 310; 30; 20 MG/100ML; MG/100ML; MG/100ML; MG/100ML
INJECTION, SOLUTION INTRAVENOUS CONTINUOUS
Status: DISCONTINUED | OUTPATIENT
Start: 2025-04-09 | End: 2025-04-10 | Stop reason: HOSPADM

## 2025-04-09 RX ORDER — LIDOCAINE HYDROCHLORIDE 20 MG/ML
INJECTION, SOLUTION EPIDURAL; INFILTRATION; INTRACAUDAL; PERINEURAL
Status: DISCONTINUED | OUTPATIENT
Start: 2025-04-09 | End: 2025-04-09

## 2025-04-09 RX ADMIN — PROPOFOL 50 MG: 10 INJECTION, EMULSION INTRAVENOUS at 10:04

## 2025-04-09 RX ADMIN — SODIUM CHLORIDE: 9 INJECTION, SOLUTION INTRAVENOUS at 10:04

## 2025-04-09 RX ADMIN — LIDOCAINE HYDROCHLORIDE 100 MG: 20 INJECTION, SOLUTION EPIDURAL; INFILTRATION; INTRACAUDAL; PERINEURAL at 10:04

## 2025-04-09 NOTE — ANESTHESIA PREPROCEDURE EVALUATION
04/09/2025  Sheri Ho is a 59 y.o., female.  Social History     Socioeconomic History    Marital status:    Tobacco Use    Smoking status: Never    Smokeless tobacco: Never   Substance and Sexual Activity    Alcohol use: Never    Drug use: Never     Social Drivers of Health     Financial Resource Strain: Low Risk  (2/20/2025)    Overall Financial Resource Strain (CARDIA)     Difficulty of Paying Living Expenses: Not very hard   Food Insecurity: Food Insecurity Present (2/20/2025)    Hunger Vital Sign     Worried About Running Out of Food in the Last Year: Sometimes true     Ran Out of Food in the Last Year: Never true   Transportation Needs: No Transportation Needs (2/20/2025)    PRAPARE - Transportation     Lack of Transportation (Medical): No     Lack of Transportation (Non-Medical): No   Physical Activity: Insufficiently Active (2/20/2025)    Exercise Vital Sign     Days of Exercise per Week: 2 days     Minutes of Exercise per Session: 20 min   Stress: Stress Concern Present (2/20/2025)    [employer] - Occupational Stress Questionnaire     Feeling of Stress : Very much   Housing Stability: High Risk (2/20/2025)    Housing Stability Vital Sign     Unable to Pay for Housing in the Last Year: Yes     Number of Times Moved in the Last Year: 0     Homeless in the Last Year: No        Pre-op Assessment    I have reviewed the Patient Summary Reports.     I have reviewed the Nursing Notes. I have reviewed the NPO Status.   I have reviewed the Medications.     Review of Systems  Anesthesia Hx:  No problems with previous Anesthesia                Social:  Non-Smoker       Cardiovascular:     Hypertension                                          Pulmonary:    Asthma  Shortness of breath                  Hepatic/GI:     GERD Liver Disease,               Psych:  Psychiatric  History                Past Medical History:   Diagnosis Date    Arthritis     Esophageal dysphagia 4/29/2021    Gastroparesis 7/29/2022    GERD (gastroesophageal reflux disease)     Mitral insufficiency     Panic attacks     SOB (shortness of breath)       Past Surgical History:   Procedure Laterality Date    ARTHROSCOPIC REPAIR OF ROTATOR CUFF OF SHOULDER Right 11/30/2021    Procedure: REPAIR, ROTATOR CUFF, ARTHROSCOPIC;  Surgeon: Cain Treviño MD;  Location: [facility] OR;  Service: Orthopedics;  Laterality: Right;    BLOCK, NERVE, SHOULDER Left 7/19/2023    Procedure: BLOCK, NERVE, SHOULDER;  Surgeon: Bernard Ellington MD;  Location: [facility];  Service: Pain Management;  Laterality: Left;  Left shoulder NB    BLOCK, NERVE, SHOULDER Right 4/10/2024    Procedure: BLOCK, NERVE, SHOULDER;  Surgeon: Bernard Ellington MD;  Location: [facility];  Service: Pain Management;  Laterality: Right;  Right shoulder NB    CHOLECYSTECTOMY      COLONOSCOPY      CYSTOSCOPY      ESOPHAGOGASTRODUODENOSCOPY      HYSTERECTOMY      LIPOMA RESECTION N/A 09/02/2022    Procedure: EXCISION, BACK LIPOMA;  Surgeon: Zane Crowe DO;  Location: [facility] OR;  Service: General;  Laterality: N/A;    OOPHORECTOMY      ROTATOR CUFF REPAIR      SHOULDER ARTHROSCOPY      SHOULDER ARTHROSCOPY Right 11/30/2021    Procedure: ARTHROSCOPY, SHOULDER;  Surgeon: Cain Treviño MD;  Location: [facility] OR;  Service: Orthopedics;  Laterality: Right;        Physical Exam  General: Well nourished, Cooperative, Alert and Oriented    Airway:  Mallampati: II     Chest/Lungs:  Clear to auscultation    Heart:  Rate: Normal        Anesthesia Plan  Type of Anesthesia, risks & benefits discussed:    Anesthesia Type: Gen Natural Airway, MAC  Intra-op Monitoring Plan: Standard ASA Monitors  Post Op Pain Control Plan: multimodal analgesia and IV/PO Opioids PRN  Induction:  IV  Informed Consent: Informed consent signed with the  Patient and all parties understand the risks and agree with anesthesia plan.  All questions answered. Patient consented to blood products? Yes  ASA Score: 3  Day of Surgery Review of History & Physical: I have interviewed and examined the patient. I have reviewed the patient's H&P dated: There are no significant changes.     Ready For Surgery From Anesthesia Perspective.     .

## 2025-04-09 NOTE — ANESTHESIA POSTPROCEDURE EVALUATION
Anesthesia Post Evaluation    Patient: Sheri Ho    Procedure(s) Performed: * No procedures listed *    Final Anesthesia Type: general      Patient location during evaluation: GI PACU  Patient participation: Yes- Able to Participate  Level of consciousness: responds to stimulation  Post-procedure vital signs: reviewed and stable  Pain management: adequate  Airway patency: patent  LIBIA mitigation strategies: Multimodal analgesia  PONV status at discharge: No PONV  Anesthetic complications: no      Cardiovascular status: hemodynamically stable  Respiratory status: unassisted, spontaneous ventilation and room air  Hydration status: euvolemic  Follow-up not needed.  Comments: The pt was not arousable even with painful stimulation during the procedure.   Refer to nursing note for pain/courtney score upon discharge from recovery.               Vitals Value Taken Time   /63 04/09/25 11:25   Temp 36.1 °C (97 °F) 04/09/25 10:58   Pulse 73 04/09/25 11:26   Resp 18 04/09/25 11:26   SpO2 97 % 04/09/25 11:26   Vitals shown include unfiled device data.      Event Time   Out of Recovery 11:34:22         Pain/Courtney Score: Courtney Score: 10 (4/9/2025 11:22 AM)

## 2025-04-09 NOTE — TRANSFER OF CARE
"Anesthesia Transfer of Care Note    Patient: Sheri Ho    Procedure(s) Performed: * No procedures listed *    Patient location: GI    Anesthesia Type: general    Transport from OR: Transported from OR on room air with adequate spontaneous ventilation    Post pain: adequate analgesia    Post assessment: no apparent anesthetic complications    Post vital signs: stable    Level of consciousness: responds to stimulation    Nausea/Vomiting: no nausea/vomiting    Complications: none    Transfer of care protocol was followed      Last vitals: Visit Vitals  BP (!) 95/54 (BP Location: Left arm, Patient Position: Lying)   Pulse 73   Temp 36.1 °C (97 °F) (Oral)   Resp (!) 34   Ht 5' 5" (1.651 m)   Wt 93.4 kg (206 lb)   SpO2 97%   Breastfeeding No   BMI 34.28 kg/m²     "

## 2025-04-09 NOTE — DISCHARGE INSTRUCTIONS
Procedure Date  4/9/25     Impression  Overall Impression:   Subcentimeter polyp in the sigmoid colon; performed cold snare  Diverticulosis of severe severity in the sigmoid colon  Tortuous colon requiring change in position and abdominal pressure to advance to cecum        Recommendation  - Suspect abdominal pain is related to constipation in the setting of luminal sigmoid stenosis from diverticulosis  - Continue linzess and miralax daily  - Repeat colonoscopy in 5 years  - Discharge patient to home  - Advance diet as tolerated  - Continue present medications  - Await pathology results  - Patient has a contact number available for emergencies. The signs and symptoms of potential delayed complications were discussed with the patient. Return to normal activities tomorrow. Written discharge instructions were provided to the patient  NO DRIVING, OPERATING EQUIPMENT, OR SIGNING LEGAL DOCUMENTS FOR 24 HOURS.   non-distended

## 2025-04-09 NOTE — H&P
History & Physical - Short Stay  Gastroenterology      SUBJECTIVE:     Procedure: Colonoscopy    Chief Complaint/Indication for Procedure: Abdominal Pain    (Not in a hospital admission)      Review of patient's allergies indicates:   Allergen Reactions    Codeine Nausea And Vomiting and Swelling        Past Medical History:   Diagnosis Date    Arthritis     Esophageal dysphagia 4/29/2021    Gastroparesis 7/29/2022    GERD (gastroesophageal reflux disease)     Mitral insufficiency     Panic attacks     SOB (shortness of breath)      Past Surgical History:   Procedure Laterality Date    ARTHROSCOPIC REPAIR OF ROTATOR CUFF OF SHOULDER Right 11/30/2021    Procedure: REPAIR, ROTATOR CUFF, ARTHROSCOPIC;  Surgeon: Cain Treviño MD;  Location: [facility] OR;  Service: Orthopedics;  Laterality: Right;    BLOCK, NERVE, SHOULDER Left 7/19/2023    Procedure: BLOCK, NERVE, SHOULDER;  Surgeon: Bernard Ellington MD;  Location: [facility];  Service: Pain Management;  Laterality: Left;  Left shoulder NB    BLOCK, NERVE, SHOULDER Right 4/10/2024    Procedure: BLOCK, NERVE, SHOULDER;  Surgeon: Bernard Ellington MD;  Location: [facility];  Service: Pain Management;  Laterality: Right;  Right shoulder NB    CHOLECYSTECTOMY      COLONOSCOPY      CYSTOSCOPY      ESOPHAGOGASTRODUODENOSCOPY      HYSTERECTOMY      LIPOMA RESECTION N/A 09/02/2022    Procedure: EXCISION, BACK LIPOMA;  Surgeon: Zane Crowe DO;  Location: [facility] OR;  Service: General;  Laterality: N/A;    OOPHORECTOMY      ROTATOR CUFF REPAIR      SHOULDER ARTHROSCOPY      SHOULDER ARTHROSCOPY Right 11/30/2021    Procedure: ARTHROSCOPY, SHOULDER;  Surgeon: Cain Treviño MD;  Location: [facility] OR;  Service: Orthopedics;  Laterality: Right;     Family History   Problem Relation Name Age of Onset    Hypertension Mother      Diabetes Father      Hypertension Father      Heart disease Father      Heart attack Father      Hypertension  Sister      Hypertension Maternal Grandmother      Breast cancer Cousin       Social History[1]      OBJECTIVE:     Physical Exam:                                                       General appearance: alert, cooperative, no distress, comfortable appearing  HENT: Normocephalic, atraumatic, neck symmetrical  Eyes: conjunctivae clear  Lungs: No labored breathing  Abd: Soft, non-tender    ASSESSMENT/PLAN:     Assessment: Abdominal Pain    Plan: Colonoscopy         [1]   Social History  Tobacco Use    Smoking status: Never    Smokeless tobacco: Never   Substance Use Topics    Alcohol use: Never    Drug use: Never

## 2025-04-11 ENCOUNTER — RESULTS FOLLOW-UP (OUTPATIENT)
Dept: GASTROENTEROLOGY | Facility: HOSPITAL | Age: 60
End: 2025-04-11

## 2025-04-17 ENCOUNTER — TELEPHONE (OUTPATIENT)
Dept: GASTROENTEROLOGY | Facility: CLINIC | Age: 60
End: 2025-04-17
Payer: MEDICARE

## 2025-04-17 NOTE — TELEPHONE ENCOUNTER
----- Message from Ulices Ann MD sent at 4/16/2025  3:47 PM CDT -----  Please advise patient that polyp pathology was reviewed and is benign and is the adenomatous type which is a precancerous/risk factor for cancer. Repeat colonoscopy recommended in 5 years. Place   reminder in system for repeat colonoscopy.  ----- Message -----  From: Lab, Background User  Sent: 4/10/2025   8:54 AM CDT  To: Ulices Ann MD

## 2025-04-17 NOTE — TELEPHONE ENCOUNTER
Spoke with the patient and gave her results. Patient verbalized a good understanding. Reminder placed in chart

## 2025-04-19 DIAGNOSIS — K21.9 GASTROESOPHAGEAL REFLUX DISEASE, UNSPECIFIED WHETHER ESOPHAGITIS PRESENT: ICD-10-CM

## 2025-04-22 RX ORDER — PANTOPRAZOLE SODIUM 40 MG/1
40 TABLET, DELAYED RELEASE ORAL 2 TIMES DAILY
Qty: 180 TABLET | Refills: 3 | Status: SHIPPED | OUTPATIENT
Start: 2025-04-22

## 2025-04-23 ENCOUNTER — OFFICE VISIT (OUTPATIENT)
Dept: ORTHOPEDICS | Facility: CLINIC | Age: 60
End: 2025-04-23
Payer: MEDICARE

## 2025-04-23 VITALS
SYSTOLIC BLOOD PRESSURE: 133 MMHG | WEIGHT: 206 LBS | HEIGHT: 65 IN | HEART RATE: 69 BPM | BODY MASS INDEX: 34.32 KG/M2 | OXYGEN SATURATION: 95 % | DIASTOLIC BLOOD PRESSURE: 70 MMHG

## 2025-04-23 DIAGNOSIS — M75.112 NONTRAUMATIC INCOMPLETE TEAR OF LEFT ROTATOR CUFF: ICD-10-CM

## 2025-04-23 DIAGNOSIS — M54.12 CERVICAL RADICULOPATHY: ICD-10-CM

## 2025-04-23 DIAGNOSIS — G89.29 CHRONIC PAIN OF BOTH SHOULDERS: Primary | ICD-10-CM

## 2025-04-23 DIAGNOSIS — M25.511 CHRONIC PAIN OF BOTH SHOULDERS: Primary | ICD-10-CM

## 2025-04-23 DIAGNOSIS — M25.512 CHRONIC PAIN OF BOTH SHOULDERS: Primary | ICD-10-CM

## 2025-04-23 PROCEDURE — 1159F MED LIST DOCD IN RCRD: CPT | Mod: CPTII,,, | Performed by: ORTHOPAEDIC SURGERY

## 2025-04-23 PROCEDURE — 3075F SYST BP GE 130 - 139MM HG: CPT | Mod: CPTII,,, | Performed by: ORTHOPAEDIC SURGERY

## 2025-04-23 PROCEDURE — 99213 OFFICE O/P EST LOW 20 MIN: CPT | Mod: S$PBB,,, | Performed by: ORTHOPAEDIC SURGERY

## 2025-04-23 PROCEDURE — 3008F BODY MASS INDEX DOCD: CPT | Mod: CPTII,,, | Performed by: ORTHOPAEDIC SURGERY

## 2025-04-23 PROCEDURE — 99214 OFFICE O/P EST MOD 30 MIN: CPT | Mod: PBBFAC | Performed by: ORTHOPAEDIC SURGERY

## 2025-04-23 PROCEDURE — 3078F DIAST BP <80 MM HG: CPT | Mod: CPTII,,, | Performed by: ORTHOPAEDIC SURGERY

## 2025-04-23 PROCEDURE — 99999 PR PBB SHADOW E&M-EST. PATIENT-LVL IV: CPT | Mod: PBBFAC,,, | Performed by: ORTHOPAEDIC SURGERY

## 2025-04-23 NOTE — PROGRESS NOTES
Patient is here for bilateral shoulder MRIs.  On right she has had a previous rotator cuff repair she has an area of to mm thickness of the supraspinatus with a questionable tear.  Her left shoulder was much more painful.  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  She is also having radicular symptoms from her neck.  She sees a pain treatment doctor for this.  I will refer back to her pain treatment doctor for possible neck injection.  Of the shoulder injection did not give her the full relief the symptoms she was hoping.  We discussed surgical options for the left shoulder.  He has the shots she gets in her neck do not relieve her symptoms then we know that has definitely her shoulder in his that time we discussed possible surgical intervention for the left shoulder.  I will check her back after she gets injections in her neck in proximally 6 weeks.

## 2025-04-30 ENCOUNTER — HOSPITAL ENCOUNTER (OUTPATIENT)
Dept: RADIOLOGY | Facility: HOSPITAL | Age: 60
Discharge: HOME | End: 2025-04-30
Attending: RADIOLOGY
Payer: MEDICARE

## 2025-04-30 DIAGNOSIS — Z12.31 SCREENING MAMMOGRAM FOR BREAST CANCER: ICD-10-CM

## 2025-04-30 PROCEDURE — 77063 BREAST TOMOSYNTHESIS BI: CPT | Mod: 26,,, | Performed by: RADIOLOGY

## 2025-04-30 PROCEDURE — 77067 SCR MAMMO BI INCL CAD: CPT | Mod: 26,,, | Performed by: RADIOLOGY

## 2025-04-30 PROCEDURE — 77063 BREAST TOMOSYNTHESIS BI: CPT | Mod: TC

## 2025-05-09 ENCOUNTER — OFFICE VISIT (OUTPATIENT)
Dept: GASTROENTEROLOGY | Facility: CLINIC | Age: 60
End: 2025-05-09
Payer: MEDICARE

## 2025-05-09 VITALS
OXYGEN SATURATION: 99 % | DIASTOLIC BLOOD PRESSURE: 63 MMHG | HEIGHT: 65 IN | WEIGHT: 206.19 LBS | SYSTOLIC BLOOD PRESSURE: 138 MMHG | HEART RATE: 75 BPM | BODY MASS INDEX: 34.35 KG/M2

## 2025-05-09 DIAGNOSIS — K21.9 GASTROESOPHAGEAL REFLUX DISEASE, UNSPECIFIED WHETHER ESOPHAGITIS PRESENT: Primary | ICD-10-CM

## 2025-05-09 DIAGNOSIS — R11.2 NAUSEA AND VOMITING, UNSPECIFIED VOMITING TYPE: ICD-10-CM

## 2025-05-09 PROCEDURE — 1160F RVW MEDS BY RX/DR IN RCRD: CPT | Mod: CPTII,,,

## 2025-05-09 PROCEDURE — 99214 OFFICE O/P EST MOD 30 MIN: CPT | Mod: PBBFAC

## 2025-05-09 PROCEDURE — 1159F MED LIST DOCD IN RCRD: CPT | Mod: CPTII,,,

## 2025-05-09 PROCEDURE — 3078F DIAST BP <80 MM HG: CPT | Mod: CPTII,,,

## 2025-05-09 PROCEDURE — 3075F SYST BP GE 130 - 139MM HG: CPT | Mod: CPTII,,,

## 2025-05-09 PROCEDURE — 99214 OFFICE O/P EST MOD 30 MIN: CPT | Mod: S$PBB,,,

## 2025-05-09 PROCEDURE — 99999 PR PBB SHADOW E&M-EST. PATIENT-LVL IV: CPT | Mod: PBBFAC,,,

## 2025-05-09 PROCEDURE — 3008F BODY MASS INDEX DOCD: CPT | Mod: CPTII,,,

## 2025-05-09 RX ORDER — DEXLANSOPRAZOLE 60 MG/1
60 CAPSULE, DELAYED RELEASE ORAL DAILY
Qty: 30 CAPSULE | Refills: 11 | Status: SHIPPED | OUTPATIENT
Start: 2025-05-09 | End: 2026-05-09

## 2025-05-09 NOTE — PATIENT INSTRUCTIONS
Avoid spicy, greasy foods  Avoid caffeine, citric acid, chocolate, peppermint, and carbonated drinks  Do not lay down within 3 hours of eating  Exercise 150 minutes per week  Increase fluid to 64 ounces daily  Avoid antiinflammatory medications such as motrin, advil, aleve, ibuprofen, and BC powder       - I recommend starting a daily fiber supplement with Citrucel or Fibercon (can purchase at your local pharmacy)  - I recommend that you also use Miralax 17 grams daily-to-twice daily as needed to have a regular, soft BM without straining you can adjust how often you need miralax, but start with daily dosing and go from there  - I recommend drinking at least 60-80 ounces of water daily unless you have a medical condition that requires fluid restriction

## 2025-05-09 NOTE — PROGRESS NOTES
CC:  GERD    HPI 60 y.o. AA female presents today for follow-up post EGD.  Patient does admit to swallowing better however she states that her stomach feels like it is on fire.  Patient is currently taking Protonix 40 mg p.o. b.i.d. and this is not helping.  Patient is using Tums and Rolaids as well to help with the heartburn.  Discuss starting patient on Dexilant and stopping the Protonix.  In his agreeable to this plan.  Patient does admit to having good bowel movements most days as she is using the Linzess in the MiraLax to have a bowel movement.  Patient denies any dysphagia, does admit to some nausea but no vomiting.  Patient denies any hematochezia or melena.  EGD 03/25/2025 empiric dilation performed for symptoms of dysphagia only mild gastritis seen.  Pathology reports gastritis.  Colonoscopy 04/09/2025 polyp in sigmoid colon, diverticulosis of severe severity in sigmoid, tortuous colon.  Abdominal pain is most likely related to constipation in the setting of luminal sigmoid stenosis from diverticulosis recommended repeat colonoscopy 5 years.  Pathology report shows tubular adenoma.  Labs reviewed AST 20 ALT 29 hemoglobin 14 hematocrit 44.4.  Interval  HPI 59 y.o. female presents today with a complaint of dysphagia food and pills are getting stuck once again with nausea and decreased appetite.  She also states that her brother just passed away in September with a diagnosis of colorectal cancer.  He was diagnosed in 2023 March in has away in September of 2024.  Patient's most recent colonoscopy 2016 no polyps no biopsies recommended repeat 10 years however since her brother's diagnosis she has been having some lower abdominal pain.  Labs reviewed no elevated liver enzymes and no anemia.  We will repeat lab today   Medical records reviewed. Additional history supplemented by nursing.     Past Medical History:   Diagnosis Date    Arthritis     Esophageal dysphagia 4/29/2021    Gastroparesis 7/29/2022    GERD  (gastroesophageal reflux disease)     Mitral insufficiency     Panic attacks     SOB (shortness of breath)          Review of Systems  General ROS: negative for chills, fever or weight loss  Cardiovascular ROS: no chest pain or dyspnea on exertion  Gastrointestinal ROS: no abdominal pain, change in bowel habits, or black/ bloody stools    Physical Exam  Constitutional:       General: She is not in acute distress.     Appearance: Normal appearance. She is not ill-appearing.   Abdominal:      General: There is no distension.      Palpations: Abdomen is soft.      Tenderness: There is no abdominal tenderness.   Neurological:      Mental Status: She is alert and oriented to person, place, and time.   Psychiatric:         Mood and Affect: Mood normal.         Behavior: Behavior normal.          Anesthesia/Surgery risks, benefits and alternative options discussed and understood by patient/family.   Labs:  Lab Results   Component Value Date    WBC 7.59 01/02/2025    HGB 14.0 01/02/2025    HCT 44.4 01/02/2025    MCV 93.9 01/02/2025     01/02/2025       CMP  Sodium   Date Value Ref Range Status   01/02/2025 139 136 - 145 mmol/L Final     Potassium   Date Value Ref Range Status   01/02/2025 4.3 3.5 - 5.1 mmol/L Final     Chloride   Date Value Ref Range Status   01/02/2025 105 98 - 107 mmol/L Final     CO2   Date Value Ref Range Status   01/02/2025 28 22 - 29 mmol/L Final     Glucose   Date Value Ref Range Status   01/02/2025 132 (H) 74 - 100 mg/dL Final     BUN   Date Value Ref Range Status   01/02/2025 12 10 - 20 mg/dL Final     Creatinine   Date Value Ref Range Status   01/02/2025 0.87 0.55 - 1.02 mg/dL Final     Calcium   Date Value Ref Range Status   01/02/2025 9.0 8.4 - 10.2 mg/dL Final     Total Protein   Date Value Ref Range Status   01/02/2025 7.0 6.4 - 8.3 g/dL Final     Albumin   Date Value Ref Range Status   01/02/2025 3.5 3.5 - 5.0 g/dL Final     Bilirubin, Total   Date Value Ref Range Status   01/02/2025  0.8 <=1.5 mg/dL Final     Alk Phos   Date Value Ref Range Status   01/02/2025 88 40 - 150 U/L Final     AST   Date Value Ref Range Status   01/02/2025 25 5 - 34 U/L Final     ALT   Date Value Ref Range Status   01/02/2025 35 <=55 U/L Final     Anion Gap   Date Value Ref Range Status   01/02/2025 10 7 - 16 mmol/L Final     eGFR    Date Value Ref Range Status   12/21/2021 102 >=60 mL/min/1.73m² Final     eGFR   Date Value Ref Range Status   04/01/2022 73 >=60 mL/min/1.73m² Final       Imaging:  No pertinent imaging    Independently reviewed    Assessment:  Sheri Ho 60 year old a AAF here with:  GERD  Nausea    Plan:  Dexilant 60 mg p.o. daily for continued severe GERD symptoms not relieved by Prilosec or Protonix along with the Pepcid or Tums.  Avoid spicy, greasy foods  Avoid caffeine, citric acid, chocolate, peppermint, and carbonated drinks  Do not lay down within 3 hours of eating  Exercise 150 minutes per week  Increase fluid to 64 ounces daily  Avoid antiinflammatory medications such as motrin, advil, aleve, ibuprofen, and BC powder   Follow-up in 3 months or sooner    I spent a total of 30 minutes on the day of the visit.This includes face to face time and non-face to face time preparing to see the patient (eg, review of tests), obtaining and/or reviewing separately obtained history, documenting clinical information in the electronic or other health record, independently interpreting results and communicating results to the patient/family/caregiver, or care coordinator.     Danna Moses, FNP [facility]

## 2025-05-28 ENCOUNTER — HOSPITAL ENCOUNTER (OUTPATIENT)
Facility: HOSPITAL | Age: 60
Discharge: HOME | End: 2025-05-28
Attending: ANESTHESIOLOGY | Admitting: ANESTHESIOLOGY
Payer: MEDICARE

## 2025-05-28 ENCOUNTER — ANESTHESIA EVENT (OUTPATIENT)
Dept: PAIN MEDICINE | Facility: HOSPITAL | Age: 60
End: 2025-05-28
Payer: MEDICARE

## 2025-05-28 ENCOUNTER — ANESTHESIA (OUTPATIENT)
Dept: PAIN MEDICINE | Facility: HOSPITAL | Age: 60
End: 2025-05-28
Payer: MEDICARE

## 2025-05-28 VITALS
HEIGHT: 65 IN | BODY MASS INDEX: 34.02 KG/M2 | SYSTOLIC BLOOD PRESSURE: 127 MMHG | RESPIRATION RATE: 16 BRPM | DIASTOLIC BLOOD PRESSURE: 76 MMHG | OXYGEN SATURATION: 98 % | HEART RATE: 61 BPM | TEMPERATURE: 97 F | WEIGHT: 204.19 LBS

## 2025-05-28 DIAGNOSIS — M25.512 LEFT SHOULDER PAIN: ICD-10-CM

## 2025-05-28 PROCEDURE — 64418 NJX AA&/STRD SPRSCAP NRV: CPT | Mod: LT | Performed by: ANESTHESIOLOGY

## 2025-05-28 PROCEDURE — 37000008 HC ANESTHESIA 1ST 15 MINUTES: Performed by: ANESTHESIOLOGY

## 2025-05-28 PROCEDURE — 63600175 PHARM REV CODE 636 W HCPCS: Performed by: ANESTHESIOLOGY

## 2025-05-28 PROCEDURE — 64417 NJX AA&/STRD AX NERVE IMG: CPT | Mod: LT | Performed by: ANESTHESIOLOGY

## 2025-05-28 PROCEDURE — 64450 NJX AA&/STRD OTHER PN/BRANCH: CPT | Mod: LT | Performed by: ANESTHESIOLOGY

## 2025-05-28 RX ORDER — BUPIVACAINE HYDROCHLORIDE 2.5 MG/ML
INJECTION, SOLUTION INFILTRATION; PERINEURAL CODE/TRAUMA/SEDATION MEDICATION
Status: DISCONTINUED | OUTPATIENT
Start: 2025-05-28 | End: 2025-05-28 | Stop reason: HOSPADM

## 2025-05-28 RX ORDER — SODIUM CHLORIDE 9 MG/ML
500 INJECTION, SOLUTION INTRAVENOUS CONTINUOUS
Status: DISCONTINUED | OUTPATIENT
Start: 2025-05-28 | End: 2025-05-28 | Stop reason: HOSPADM

## 2025-05-28 NOTE — ANESTHESIA PREPROCEDURE EVALUATION
05/28/2025  Sheri Ho is a 60 y.o., female.      Pre-op Assessment    I have reviewed the Patient Summary Reports.     I have reviewed the Nursing Notes. I have reviewed the NPO Status.   I have reviewed the Medications.     Review of Systems  Anesthesia Hx:  No problems with previous Anesthesia                Social:  Non-Smoker, No Alcohol Use       Cardiovascular:     Hypertension                                          Pulmonary:    Asthma  Shortness of breath                  Hepatic/GI:     GERD Liver Disease,               Psych:  Psychiatric History                  Physical Exam  General: Cooperative, Alert, Oriented and Well nourished    Airway:  Mallampati: II   Mouth Opening: Normal  TM Distance: Normal  Tongue: Normal  Neck ROM: Normal ROM        Anesthesia Plan  Type of Anesthesia, risks & benefits discussed:    Anesthesia Type: Gen Natural Airway, MAC  Intra-op Monitoring Plan: Standard ASA Monitors  Post Op Pain Control Plan: IV/PO Opioids PRN  Induction:  IV  Airway Plan: , Post-Induction  Informed Consent: Informed consent signed with the Patient and all parties understand the risks and agree with anesthesia plan.  All questions answered.   ASA Score: 2  Day of Surgery Review of History & Physical: H&P Update referred to the surgeon/provider.    Ready For Surgery From Anesthesia Perspective.     .    Past Medical History:   Diagnosis Date    Arthritis     Esophageal dysphagia 4/29/2021    Gastroparesis 7/29/2022    GERD (gastroesophageal reflux disease)     Mitral insufficiency     Panic attacks     SOB (shortness of breath)        Past Surgical History:   Procedure Laterality Date    ARTHROSCOPIC REPAIR OF ROTATOR CUFF OF SHOULDER Right 11/30/2021    Procedure: REPAIR, ROTATOR CUFF, ARTHROSCOPIC;  Surgeon: Cain Treviño MD;  Location: [facility];  Service: Orthopedics;   Laterality: Right;    BLOCK, NERVE, SHOULDER Left 7/19/2023    Procedure: BLOCK, NERVE, SHOULDER;  Surgeon: Bernard Ellington MD;  Location: [facility];  Service: Pain Management;  Laterality: Left;  Left shoulder NB    BLOCK, NERVE, SHOULDER Right 4/10/2024    Procedure: BLOCK, NERVE, SHOULDER;  Surgeon: Bernard Ellington MD;  Location: [facility];  Service: Pain Management;  Laterality: Right;  Right shoulder NB    CHOLECYSTECTOMY      COLONOSCOPY      CYSTOSCOPY      ESOPHAGOGASTRODUODENOSCOPY      HYSTERECTOMY      LIPOMA RESECTION N/A 09/02/2022    Procedure: EXCISION, BACK LIPOMA;  Surgeon: Zane Crowe DO;  Location: [facility] OR;  Service: General;  Laterality: N/A;    OOPHORECTOMY      ROTATOR CUFF REPAIR      SHOULDER ARTHROSCOPY      SHOULDER ARTHROSCOPY Right 11/30/2021    Procedure: ARTHROSCOPY, SHOULDER;  Surgeon: Cain Treviño MD;  Location: [facility];  Service: Orthopedics;  Laterality: Right;       Family History   Problem Relation Name Age of Onset    Hypertension Mother      Diabetes Father      Hypertension Father      Heart disease Father      Heart attack Father      Hypertension Sister      Hypertension Maternal Grandmother      Breast cancer Cousin         Social History     Socioeconomic History    Marital status:    Tobacco Use    Smoking status: Never    Smokeless tobacco: Never   Substance and Sexual Activity    Alcohol use: Never    Drug use: Never     Social Drivers of Health     Financial Resource Strain: Low Risk  (2/20/2025)    Overall Financial Resource Strain (CARDIA)     Difficulty of Paying Living Expenses: Not very hard   Food Insecurity: Food Insecurity Present (2/20/2025)    Hunger Vital Sign     Worried About Running Out of Food in the Last Year: Sometimes true     Ran Out of Food in the Last Year: Never true   Transportation Needs: No Transportation Needs (2/20/2025)    PRAPARE - Transportation     Lack of Transportation  (Medical): No     Lack of Transportation (Non-Medical): No   Physical Activity: Insufficiently Active (2/20/2025)    Exercise Vital Sign     Days of Exercise per Week: 2 days     Minutes of Exercise per Session: 20 min   Stress: Stress Concern Present (2/20/2025)    [employer] - Occupational Stress Questionnaire     Feeling of Stress : Very much   Housing Stability: High Risk (2/20/2025)    Housing Stability Vital Sign     Unable to Pay for Housing in the Last Year: Yes     Number of Times Moved in the Last Year: 0     Homeless in the Last Year: No       Current Medications[1]    Review of patient's allergies indicates:   Allergen Reactions    Codeine Nausea And Vomiting and Swelling               [1]   Current Facility-Administered Medications   Medication Dose Route Frequency Provider Last Rate Last Admin    0.9% NaCl infusion  500 mL Intravenous Continuous Bernard Ellington MD         Facility-Administered Medications Ordered in Other Encounters   Medication Dose Route Frequency Provider Last Rate Last Admin    0.9%  NaCl infusion   Intravenous Continuous Cain Treviño  mL/hr at 04/09/25 1012 New Bag at 04/09/25 1012

## 2025-05-29 NOTE — ANESTHESIA POSTPROCEDURE EVALUATION
Anesthesia Post Evaluation    Patient: Sheri Ho    Procedure(s) Performed: Procedure(s) (LRB):  BLOCK, NERVE, SHOULDER (Left)    Final Anesthesia Type: MAC      Patient location during evaluation: GI PACU  Patient participation: Yes- Able to Participate  Level of consciousness: awake and alert and oriented  Post-procedure vital signs: reviewed and stable  Pain management: adequate  Airway patency: patent    PONV status at discharge: No PONV  Anesthetic complications: no      Cardiovascular status: blood pressure returned to baseline, stable and hemodynamically stable  Respiratory status: unassisted, spontaneous ventilation and room air  Hydration status: euvolemic                Vitals Value Taken Time   /76 05/28/25 14:27   Temp 36.2 05/29/25 08:13   Pulse 59 05/28/25 14:28   Resp 16 05/29/25 08:13   SpO2 98 % 05/28/25 14:28   Vitals shown include unfiled device data.      Event Time   Out of Recovery 14:27:00         Pain/Avila Score: Avila Score: 10 (5/28/2025  2:10 PM)

## 2025-06-19 ENCOUNTER — OFFICE VISIT (OUTPATIENT)
Dept: CARDIOLOGY | Facility: CLINIC | Age: 60
End: 2025-06-19
Payer: MEDICARE

## 2025-06-19 VITALS
HEIGHT: 65 IN | DIASTOLIC BLOOD PRESSURE: 76 MMHG | BODY MASS INDEX: 34.49 KG/M2 | WEIGHT: 207 LBS | SYSTOLIC BLOOD PRESSURE: 138 MMHG

## 2025-06-19 DIAGNOSIS — I49.3 PVC (PREMATURE VENTRICULAR CONTRACTION): ICD-10-CM

## 2025-06-19 DIAGNOSIS — R00.2 PALPITATIONS: Primary | ICD-10-CM

## 2025-06-19 DIAGNOSIS — I10 ESSENTIAL HYPERTENSION, BENIGN: Chronic | ICD-10-CM

## 2025-06-19 DIAGNOSIS — I49.3 FREQUENT PVCS: ICD-10-CM

## 2025-06-19 PROCEDURE — 99214 OFFICE O/P EST MOD 30 MIN: CPT | Mod: PBBFAC | Performed by: NURSE PRACTITIONER

## 2025-06-19 PROCEDURE — 3078F DIAST BP <80 MM HG: CPT | Mod: CPTII,,, | Performed by: NURSE PRACTITIONER

## 2025-06-19 PROCEDURE — 99999 PR PBB SHADOW E&M-EST. PATIENT-LVL IV: CPT | Mod: PBBFAC,,, | Performed by: NURSE PRACTITIONER

## 2025-06-19 PROCEDURE — 3008F BODY MASS INDEX DOCD: CPT | Mod: CPTII,,, | Performed by: NURSE PRACTITIONER

## 2025-06-19 PROCEDURE — 1159F MED LIST DOCD IN RCRD: CPT | Mod: CPTII,,, | Performed by: NURSE PRACTITIONER

## 2025-06-19 PROCEDURE — 3075F SYST BP GE 130 - 139MM HG: CPT | Mod: CPTII,,, | Performed by: NURSE PRACTITIONER

## 2025-06-19 PROCEDURE — 99214 OFFICE O/P EST MOD 30 MIN: CPT | Mod: S$PBB,,, | Performed by: NURSE PRACTITIONER

## 2025-06-19 RX ORDER — VERAPAMIL HYDROCHLORIDE 120 MG/1
120 CAPSULE, EXTENDED RELEASE ORAL DAILY
Qty: 90 CAPSULE | Refills: 3 | Status: SHIPPED | OUTPATIENT
Start: 2025-06-19 | End: 2026-06-19

## 2025-06-19 RX ORDER — METOPROLOL SUCCINATE 25 MG/1
25 TABLET, EXTENDED RELEASE ORAL DAILY
Qty: 90 TABLET | Refills: 3 | Status: SHIPPED | OUTPATIENT
Start: 2025-06-19 | End: 2026-06-19

## 2025-06-19 RX ORDER — METOPROLOL SUCCINATE 25 MG/1
25 TABLET, EXTENDED RELEASE ORAL DAILY
Qty: 90 TABLET | Refills: 3 | Status: CANCELLED | OUTPATIENT
Start: 2025-06-19 | End: 2026-06-19

## 2025-06-19 NOTE — PROGRESS NOTES
PCP: Ayleen Casas FNP    Referring Provider:     Subjective:   Sheri Ho is a 60 y.o. female with hx of asthma, gastroparesis, GERD, anxiety, panic attacks who presents for follow up.     Today, pt has c/o palpitations and SOB on exertion. She states she has a lot of anxiety. EKG shows NSR. She had a normal Echo and NM stress test 1 yr ago. ZIO monitor 1 yr ago showed predominant NSR, intermittent LBBB, rare PACs and PVCs (symptomatic) and ventricular bigeminy and trigeminy. She was started on diltiazem, however she developed a rash and had to be switched to verapamil.             Family History   Problem Relation Name Age of Onset    Hypertension Mother      Diabetes Father      Hypertension Father      Heart disease Father      Heart attack Father      Hypertension Sister      Hypertension Maternal Grandmother      Breast cancer Cousin        Social History[1]     EKG   Results for orders placed or performed in visit on 03/19/25   EKG 12-lead    Collection Time: 03/19/25 10:52 AM   Result Value Ref Range    QRS Duration 72 ms    [facility] QTC Calculation 433 ms    Narrative    Test Reason : I10,    Vent. Rate :  89 BPM     Atrial Rate :  89 BPM     P-R Int : 126 ms          QRS Dur :  72 ms      QT Int : 356 ms       P-R-T Axes :  81  68  47 degrees    QTcB Int : 433 ms    Sinus rhythm with frequent Premature ventricular complexes  Nonspecific ST abnormality  Abnormal ECG  When compared with ECG of 01-May-2024 13:24,  Nonspecific T wave abnormality no longer evident in Anterior leads  Confirmed by Barry Figueroa ([id number]) on 3/31/2025 8:35:31 AM    Referred By:            Confirmed By: Barry Figueroa     ECHO Results for orders placed during the hospital encounter of 06/03/24    Echo    Interpretation Summary    Left Ventricle: The left ventricle is normal in size. Normal wall thickness. There is normal systolic function with a visually estimated ejection fraction of 55 - 70%. There is normal diastolic function.     "Right Ventricle: Normal right ventricular cavity size. Systolic function is normal.    Aortic Valve: The aortic valve is a trileaflet valve.    Mitral Valve: There is mild regurgitation.    Tricuspid Valve: There is mild regurgitation.    [facility] No results found for this or any previous visit.        Lab Results   Component Value Date     01/02/2025    K 4.3 01/02/2025     01/02/2025    CO2 28 01/02/2025    BUN 12 01/02/2025    CREATININE 0.87 01/02/2025    CALCIUM 9.0 01/02/2025    ANIONGAP 10 01/02/2025    ESTGFRAFRICA 102 12/21/2021    EGFRNONAA 73 04/01/2022       Lab Results   Component Value Date    CHOL 166 04/06/2023    CHOL 175 04/01/2022    CHOL 167 08/18/2021     Lab Results   Component Value Date    HDL 56 04/06/2023    HDL 61 (H) 04/01/2022    HDL 58 08/18/2021     Lab Results   Component Value Date    LDLCALC 99 04/06/2023    LDLCALC 97 04/01/2022    LDLCALC 88 08/18/2021     Lab Results   Component Value Date    TRIG 54 04/06/2023    TRIG 87 04/01/2022    TRIG 104 08/18/2021     Lab Results   Component Value Date    CHOLHDL 3.0 04/06/2023    CHOLHDL 2.9 04/01/2022    CHOLHDL 2.9 08/18/2021       Lab Results   Component Value Date    WBC 7.59 01/02/2025    HGB 14.0 01/02/2025    HCT 44.4 01/02/2025    MCV 93.9 01/02/2025     01/02/2025         Current Medications[2]    Review of Systems   Constitutional:  Negative for chills, diaphoresis, fever and malaise/fatigue.   Respiratory:  Positive for shortness of breath. Negative for cough.    Cardiovascular:  Positive for palpitations. Negative for chest pain, orthopnea, leg swelling and PND.   Gastrointestinal:  Negative for abdominal pain, nausea and vomiting.   Musculoskeletal:  Negative for falls.   Neurological:  Negative for focal weakness and weakness.         Objective:   /76 (BP Location: Left arm, Patient Position: Sitting)   Ht 5' 5" (1.651 m)   Wt 93.9 kg (207 lb)   BMI 34.45 kg/m²     Physical Exam  Constitutional:       " General: She is not in acute distress.     Appearance: Normal appearance.   Cardiovascular:      Rate and Rhythm: Normal rate and regular rhythm.      Pulses: Normal pulses.      Heart sounds: Normal heart sounds.   Pulmonary:      Effort: Pulmonary effort is normal.      Breath sounds: Normal breath sounds.   Musculoskeletal:      Cervical back: Neck supple. No rigidity.      Right lower leg: No edema.      Left lower leg: No edema.   Skin:     General: Skin is warm and dry.   Neurological:      Mental Status: She is alert.           Assessment:     1. Palpitations  EKG 12-lead    EKG 12-lead      2. Frequent PVCs  EKG 12-lead    EKG 12-lead      3. PVC (premature ventricular contraction)  metoprolol succinate (TOPROL-XL) 25 MG 24 hr tablet      4. Essential hypertension, benign              Plan:   Palpitations  On Verapamil and Toprol XL 25mg daily  EKG today shows NSR with no PVCs    Essential hypertension, benign  Controlled on current meds    Frequent PVCs  EKG today shows NSR with no PVCs  Continue Toprol XL 25mg daily and verapamil      Follow up with Dr. Jimenez in 6 months         [1]   Social History  Socioeconomic History    Marital status:    Tobacco Use    Smoking status: Never    Smokeless tobacco: Never   Substance and Sexual Activity    Alcohol use: Never    Drug use: Never     Social Drivers of Health     Financial Resource Strain: Low Risk  (2/20/2025)    Overall Financial Resource Strain (CARDIA)     Difficulty of Paying Living Expenses: Not very hard   Food Insecurity: Food Insecurity Present (2/20/2025)    Hunger Vital Sign     Worried About Running Out of Food in the Last Year: Sometimes true     Ran Out of Food in the Last Year: Never true   Transportation Needs: No Transportation Needs (2/20/2025)    PRAPARE - Transportation     Lack of Transportation (Medical): No     Lack of Transportation (Non-Medical): No   Physical Activity: Insufficiently Active (2/20/2025)    Exercise Vital Sign      Days of Exercise per Week: 2 days     Minutes of Exercise per Session: 20 min   Stress: Stress Concern Present (2/20/2025)    [employer] [place] [employer] - Occupational Stress Questionnaire     Feeling of Stress : Very much   Housing Stability: High Risk (2/20/2025)    Housing Stability Vital Sign     Unable to Pay for Housing in the Last Year: Yes     Number of Times Moved in the Last Year: 0     Homeless in the Last Year: No   [2]   Current Outpatient Medications:     albuterol (PROVENTIL/VENTOLIN HFA) 90 mcg/actuation inhaler, Inhale 2 puffs into the lungs every 4 (four) hours as needed for Wheezing., Disp: 54 g, Rfl: 3    aspirin (ECOTRIN) 81 MG EC tablet, Take 81 mg by mouth once daily., Disp: , Rfl:     budesonide-formoterol 160-4.5 mcg (SYMBICORT) 160-4.5 mcg/actuation HFAA, Inhale 2 puffs into the lungs 2 (two) times a day., Disp: 30.6 g, Rfl: 3    cyclobenzaprine (FLEXERIL) 10 MG tablet, Take 10 mg by mouth 3 (three) times daily., Disp: , Rfl:     dexlansoprazole (DEXILANT) 60 mg capsule, Take 1 capsule (60 mg total) by mouth once daily., Disp: 30 capsule, Rfl: 11    EScitalopram oxalate (LEXAPRO) 20 MG tablet, Take 1 tablet (20 mg total) by mouth once daily., Disp: 90 tablet, Rfl: 3    hydrALAZINE (APRESOLINE) 10 MG tablet, TAKE 1 TABLET BY MOUTH TWICE A DAY, Disp: 180 tablet, Rfl: 1    HYDROcodone-acetaminophen (NORCO)  mg per tablet, Take 1 tablet by mouth every 8 (eight) hours as needed., Disp: , Rfl:     LINZESS 145 mcg Cap capsule, TAKE 1 CAPSULE DAILY BEFORE BREAKFAST., Disp: 90 capsule, Rfl: 3    multivitamin (MULTIPLE VITAMINS ORAL), Take 1 tablet by mouth once daily. OTC: w/ Iron & Folic Acid, Disp: , Rfl:     metoprolol succinate (TOPROL-XL) 25 MG 24 hr tablet, Take 1 tablet (25 mg total) by mouth once daily., Disp: 90 tablet, Rfl: 3    verapamiL (VERELAN) 120 MG C24P, Take 1 capsule (120 mg total) by mouth once daily., Disp: 90 capsule, Rfl: 3  No current  facility-administered medications for this visit.    Facility-Administered Medications Ordered in Other Visits:     0.9%  NaCl infusion, , Intravenous, Continuous, Cain Treviño MD, Last Rate: 500 mL/hr at 04/09/25 1012, New Bag at 04/09/25 1012

## 2025-08-28 ENCOUNTER — OFFICE VISIT (OUTPATIENT)
Dept: GASTROENTEROLOGY | Facility: CLINIC | Age: 60
End: 2025-08-28
Payer: MEDICARE

## 2025-08-28 VITALS
OXYGEN SATURATION: 96 % | WEIGHT: 209.38 LBS | DIASTOLIC BLOOD PRESSURE: 61 MMHG | HEART RATE: 75 BPM | BODY MASS INDEX: 34.85 KG/M2 | SYSTOLIC BLOOD PRESSURE: 132 MMHG

## 2025-08-28 DIAGNOSIS — R10.13 EPIGASTRIC PAIN: ICD-10-CM

## 2025-08-28 DIAGNOSIS — K21.9 GASTROESOPHAGEAL REFLUX DISEASE, UNSPECIFIED WHETHER ESOPHAGITIS PRESENT: Primary | ICD-10-CM

## 2025-08-28 DIAGNOSIS — K58.1 IRRITABLE BOWEL SYNDROME WITH CONSTIPATION: ICD-10-CM

## 2025-08-28 PROCEDURE — 3008F BODY MASS INDEX DOCD: CPT | Mod: CPTII,,,

## 2025-08-28 PROCEDURE — 3078F DIAST BP <80 MM HG: CPT | Mod: CPTII,,,

## 2025-08-28 PROCEDURE — 3075F SYST BP GE 130 - 139MM HG: CPT | Mod: CPTII,,,

## 2025-08-28 PROCEDURE — 1160F RVW MEDS BY RX/DR IN RCRD: CPT | Mod: CPTII,,,

## 2025-08-28 PROCEDURE — 1159F MED LIST DOCD IN RCRD: CPT | Mod: CPTII,,,

## 2025-08-28 PROCEDURE — 99214 OFFICE O/P EST MOD 30 MIN: CPT | Mod: S$PBB,,,

## 2025-08-28 PROCEDURE — 99215 OFFICE O/P EST HI 40 MIN: CPT | Mod: PBBFAC

## 2025-08-28 PROCEDURE — 99999 PR PBB SHADOW E&M-EST. PATIENT-LVL V: CPT | Mod: PBBFAC,,,

## 2025-08-28 RX ORDER — CETIRIZINE HYDROCHLORIDE 10 MG/1
10 TABLET ORAL DAILY
COMMUNITY
Start: 2025-07-22 | End: 2026-07-22

## 2025-08-28 RX ORDER — BUPROPION HYDROCHLORIDE 100 MG/1
100 TABLET, FILM COATED ORAL EVERY MORNING
COMMUNITY
Start: 2025-08-07

## 2025-08-28 RX ORDER — ESCITALOPRAM OXALATE 5 MG/1
5 TABLET ORAL DAILY
COMMUNITY

## 2025-08-28 RX ORDER — CYANOCOBALAMIN 1000 UG/ML
1000 INJECTION, SOLUTION INTRAMUSCULAR; SUBCUTANEOUS
COMMUNITY
Start: 2025-08-27

## (undated) DEVICE — GOWN NONREINF SET-IN SLV 2XL

## (undated) DEVICE — SOL CONTINU-FLO SET 2 LAV

## (undated) DEVICE — WRAP SHOULDER POSTOP W/COLD PK

## (undated) DEVICE — SUT ETHILON 3-0 PS2 18 BLK

## (undated) DEVICE — TRAY NERVE BLOCK UNIV 10/CA

## (undated) DEVICE — CANNULA TWIST-IN 8.25MMX7CM

## (undated) DEVICE — WATER BOOM FLOOR SUCTION STRIP

## (undated) DEVICE — PROBE APOLLO RF 90 SHOULDER

## (undated) DEVICE — GLOVE PROTEXIS PI SYN SURG 6.5

## (undated) DEVICE — CATH IV 22G X 1 AUTOGUARD

## (undated) DEVICE — GLOVE SURGICAL PROTEXIS PI CLASSIC SIZE 8.0

## (undated) DEVICE — Device

## (undated) DEVICE — GLOVE SURGICAL PROTEXIS PI CLASSIC SIZE 7.5

## (undated) DEVICE — GOWN SURGICAL SMARTGOWN LEVEL 4 / EXTRA LARGE STERILE

## (undated) DEVICE — GLOVE SURGICAL PROTEXIS PI CLASSIC SIZE 7.0

## (undated) DEVICE — SUT 2/0 36IN COATED VICRYL

## (undated) DEVICE — GLOVE SURGICAL PROTEXIS PI BLUE SIZE 7.0

## (undated) DEVICE — GLOVE 8 PROTEXIS PI BLUE

## (undated) DEVICE — APPLICATOR CHLORAPREP ORN 26ML

## (undated) DEVICE — BUR AUGER ULMT 5X125MM

## (undated) DEVICE — CATH INTROCAN SAF 2 IV 22GX1IN

## (undated) DEVICE — SLIPPER FALL PREV YEL BARIAT

## (undated) DEVICE — GLOVE SENSICARE PI SURG 6.5

## (undated) DEVICE — GLOVE SURGICAL PROTEXIS PI CLASSIC SIZE 8.5

## (undated) DEVICE — NEEDLE SCORPION SUREFIRE

## (undated) DEVICE — GLOVE SURGICAL PROTEXIS PI CLASSIC SIZE 6.5

## (undated) DEVICE — WRAP COBAN SELF ADHERENT 4IN X 5YD STERILE

## (undated) DEVICE — NDL TUOHY 22G X 3.5

## (undated) DEVICE — SLING ARM ULTRA III LG

## (undated) DEVICE — KIT IV START RUSH

## (undated) DEVICE — SET EXT STD BORE CATH 7.6IN

## (undated) DEVICE — KIT IV START

## (undated) DEVICE — KIT BASIC RUSH

## (undated) DEVICE — APPLICATOR CHLORAPREP HI-LITE TINTED ORANGE 26ML

## (undated) DEVICE — SLIPPER FALL PREV YELLOW XLG

## (undated) DEVICE — GLOVE PROTEXIS PI SYN SURG 7.5

## (undated) DEVICE — NDL SPINAL SPINOCAN 22GX3.5

## (undated) DEVICE — TUBING ARTHRO STRYKER

## (undated) DEVICE — WRAP ARM STERILE DISPOSABLE

## (undated) DEVICE — CUTTER TOMCAT 4X125MM

## (undated) DEVICE — GOWN SURGICAL STERILE LEVEL 3 / XX-LARGE

## (undated) DEVICE — GLOVE SENSICARE PI ALOE 8

## (undated) DEVICE — SET EXTENSION CLEARLINK 2INJ

## (undated) DEVICE — GLOVE 6.0 PROTEXIS PI BLUE

## (undated) DEVICE — NDL QUINCKE SHRP BVL 22G 3.5IN

## (undated) DEVICE — GLOVE PROTEXIS PI SYN SURG 6.0

## (undated) DEVICE — GLOVE PROTEXIS PI SYN SURG 8.0

## (undated) DEVICE — SOL IRRIGATION SALINE 3000ML BAG

## (undated) DEVICE — SYRINGE 30CC LL